# Patient Record
Sex: FEMALE | NOT HISPANIC OR LATINO | Employment: UNEMPLOYED | URBAN - METROPOLITAN AREA
[De-identification: names, ages, dates, MRNs, and addresses within clinical notes are randomized per-mention and may not be internally consistent; named-entity substitution may affect disease eponyms.]

---

## 2020-01-01 ENCOUNTER — APPOINTMENT (OUTPATIENT)
Dept: RADIOLOGY | Facility: MEDICAL CENTER | Age: 40
DRG: 179 | End: 2020-01-01
Payer: OTHER GOVERNMENT

## 2020-01-01 ENCOUNTER — APPOINTMENT (OUTPATIENT)
Dept: CARDIOLOGY | Facility: MEDICAL CENTER | Age: 40
DRG: 179 | End: 2020-01-01
Attending: STUDENT IN AN ORGANIZED HEALTH CARE EDUCATION/TRAINING PROGRAM
Payer: OTHER GOVERNMENT

## 2020-01-01 ENCOUNTER — PATIENT OUTREACH (OUTPATIENT)
Dept: HEALTH INFORMATION MANAGEMENT | Facility: OTHER | Age: 40
End: 2020-01-01

## 2020-01-01 ENCOUNTER — HOSPITAL ENCOUNTER (INPATIENT)
Facility: MEDICAL CENTER | Age: 40
LOS: 2 days | DRG: 179 | End: 2020-12-16
Attending: EMERGENCY MEDICINE | Admitting: STUDENT IN AN ORGANIZED HEALTH CARE EDUCATION/TRAINING PROGRAM
Payer: OTHER GOVERNMENT

## 2020-01-01 ENCOUNTER — PHARMACY VISIT (OUTPATIENT)
Dept: PHARMACY | Facility: MEDICAL CENTER | Age: 40
End: 2020-01-01
Payer: COMMERCIAL

## 2020-01-01 VITALS
WEIGHT: 130.51 LBS | OXYGEN SATURATION: 96 % | HEART RATE: 87 BPM | DIASTOLIC BLOOD PRESSURE: 65 MMHG | SYSTOLIC BLOOD PRESSURE: 95 MMHG | RESPIRATION RATE: 20 BRPM | BODY MASS INDEX: 22.28 KG/M2 | TEMPERATURE: 97 F | HEIGHT: 64 IN

## 2020-01-01 DIAGNOSIS — R06.00 DYSPNEA, UNSPECIFIED TYPE: ICD-10-CM

## 2020-01-01 LAB
ALBUMIN SERPL BCP-MCNC: 3 G/DL (ref 3.2–4.9)
ALBUMIN SERPL BCP-MCNC: 3 G/DL (ref 3.2–4.9)
ALBUMIN/GLOB SERPL: 0.6 G/DL
ALP SERPL-CCNC: 101 U/L (ref 30–99)
ALP SERPL-CCNC: 96 U/L (ref 30–99)
ALT SERPL-CCNC: 13 U/L (ref 2–50)
ALT SERPL-CCNC: 17 U/L (ref 2–50)
ANION GAP SERPL CALC-SCNC: 7 MMOL/L (ref 7–16)
ANION GAP SERPL CALC-SCNC: 8 MMOL/L (ref 7–16)
ANISOCYTOSIS BLD QL SMEAR: ABNORMAL
AST SERPL-CCNC: 24 U/L (ref 12–45)
AST SERPL-CCNC: 31 U/L (ref 12–45)
BASOPHILS # BLD AUTO: 0.9 % (ref 0–1.8)
BASOPHILS # BLD: 0.08 K/UL (ref 0–0.12)
BILIRUB CONJ SERPL-MCNC: 0.3 MG/DL (ref 0.1–0.5)
BILIRUB INDIRECT SERPL-MCNC: 0.5 MG/DL (ref 0–1)
BILIRUB SERPL-MCNC: 0.8 MG/DL (ref 0.1–1.5)
BILIRUB SERPL-MCNC: 1.1 MG/DL (ref 0.1–1.5)
BUN SERPL-MCNC: 15 MG/DL (ref 8–22)
BUN SERPL-MCNC: 8 MG/DL (ref 8–22)
BURR CELLS BLD QL SMEAR: NORMAL
CALCIUM SERPL-MCNC: 8.3 MG/DL (ref 8.5–10.5)
CALCIUM SERPL-MCNC: 8.4 MG/DL (ref 8.5–10.5)
CHLORIDE SERPL-SCNC: 100 MMOL/L (ref 96–112)
CHLORIDE SERPL-SCNC: 104 MMOL/L (ref 96–112)
CK SERPL-CCNC: 74 U/L (ref 0–154)
CO2 SERPL-SCNC: 22 MMOL/L (ref 20–33)
CO2 SERPL-SCNC: 25 MMOL/L (ref 20–33)
COVID ORDER STATUS COVID19: NORMAL
CREAT SERPL-MCNC: 0.88 MG/DL (ref 0.5–1.4)
CREAT SERPL-MCNC: 0.98 MG/DL (ref 0.5–1.4)
CRP SERPL HS-MCNC: 3.02 MG/DL (ref 0–0.75)
EKG IMPRESSION: NORMAL
EOSINOPHIL # BLD AUTO: 0.15 K/UL (ref 0–0.51)
EOSINOPHIL NFR BLD: 1.7 % (ref 0–6.9)
ERYTHROCYTE [DISTWIDTH] IN BLOOD BY AUTOMATED COUNT: 57.1 FL (ref 35.9–50)
ERYTHROCYTE [DISTWIDTH] IN BLOOD BY AUTOMATED COUNT: 57.7 FL (ref 35.9–50)
FLUAV RNA SPEC QL NAA+PROBE: NEGATIVE
FLUBV RNA SPEC QL NAA+PROBE: NEGATIVE
GLOBULIN SER CALC-MCNC: 5.2 G/DL (ref 1.9–3.5)
GLUCOSE SERPL-MCNC: 84 MG/DL (ref 65–99)
GLUCOSE SERPL-MCNC: 85 MG/DL (ref 65–99)
HCT VFR BLD AUTO: 35.7 % (ref 37–47)
HCT VFR BLD AUTO: 36.1 % (ref 37–47)
HGB BLD-MCNC: 11.2 G/DL (ref 12–16)
HGB BLD-MCNC: 11.2 G/DL (ref 12–16)
INR PPP: 1.36 (ref 0.87–1.13)
LV EJECT FRACT  99904: 50
LV EJECT FRACT MOD 2C 99903: 50
LV EJECT FRACT MOD 4C 99902: 51.95
LV EJECT FRACT MOD BP 99901: 58.46
LYMPHOCYTES # BLD AUTO: 0.45 K/UL (ref 1–4.8)
LYMPHOCYTES NFR BLD: 5.2 % (ref 22–41)
MACROCYTES BLD QL SMEAR: ABNORMAL
MAGNESIUM SERPL-MCNC: 1.4 MG/DL (ref 1.5–2.5)
MANUAL DIFF BLD: NORMAL
MCH RBC QN AUTO: 28.3 PG (ref 27–33)
MCH RBC QN AUTO: 29.3 PG (ref 27–33)
MCHC RBC AUTO-ENTMCNC: 31 G/DL (ref 33.6–35)
MCHC RBC AUTO-ENTMCNC: 31.4 G/DL (ref 33.6–35)
MCV RBC AUTO: 91.2 FL (ref 81.4–97.8)
MCV RBC AUTO: 93.5 FL (ref 81.4–97.8)
MONOCYTES # BLD AUTO: 0.23 K/UL (ref 0–0.85)
MONOCYTES NFR BLD AUTO: 2.6 % (ref 0–13.4)
MORPHOLOGY BLD-IMP: NORMAL
NEUTROPHILS # BLD AUTO: 7.8 K/UL (ref 2–7.15)
NEUTROPHILS NFR BLD: 89.6 % (ref 44–72)
NRBC # BLD AUTO: 0 K/UL
NRBC BLD-RTO: 0 /100 WBC
NT-PROBNP SERPL IA-MCNC: 3645 PG/ML (ref 0–125)
OVALOCYTES BLD QL SMEAR: NORMAL
PHOSPHATE SERPL-MCNC: 3.4 MG/DL (ref 2.5–4.5)
PLATELET # BLD AUTO: 262 K/UL (ref 164–446)
PLATELET # BLD AUTO: 305 K/UL (ref 164–446)
PLATELET BLD QL SMEAR: NORMAL
PMV BLD AUTO: 10.3 FL (ref 9–12.9)
PMV BLD AUTO: 10.3 FL (ref 9–12.9)
POIKILOCYTOSIS BLD QL SMEAR: NORMAL
POTASSIUM SERPL-SCNC: 3.9 MMOL/L (ref 3.6–5.5)
POTASSIUM SERPL-SCNC: 4 MMOL/L (ref 3.6–5.5)
PROCALCITONIN SERPL-MCNC: <0.05 NG/ML
PROT SERPL-MCNC: 8.1 G/DL (ref 6–8.2)
PROT SERPL-MCNC: 8.2 G/DL (ref 6–8.2)
PROTHROMBIN TIME: 17.2 SEC (ref 12–14.6)
RBC # BLD AUTO: 3.82 M/UL (ref 4.2–5.4)
RBC # BLD AUTO: 3.96 M/UL (ref 4.2–5.4)
RBC BLD AUTO: PRESENT
RSV RNA SPEC QL NAA+PROBE: NEGATIVE
SARS-COV-2 RNA RESP QL NAA+PROBE: DETECTED
SODIUM SERPL-SCNC: 132 MMOL/L (ref 135–145)
SODIUM SERPL-SCNC: 134 MMOL/L (ref 135–145)
SPECIMEN SOURCE: ABNORMAL
TROPONIN T SERPL-MCNC: 13 NG/L (ref 6–19)
TROPONIN T SERPL-MCNC: 15 NG/L (ref 6–19)
TROPONIN T SERPL-MCNC: 15 NG/L (ref 6–19)
WBC # BLD AUTO: 6.5 K/UL (ref 4.8–10.8)
WBC # BLD AUTO: 8.7 K/UL (ref 4.8–10.8)

## 2020-01-01 PROCEDURE — 700111 HCHG RX REV CODE 636 W/ 250 OVERRIDE (IP): Performed by: EMERGENCY MEDICINE

## 2020-01-01 PROCEDURE — 96374 THER/PROPH/DIAG INJ IV PUSH: CPT

## 2020-01-01 PROCEDURE — C9803 HOPD COVID-19 SPEC COLLECT: HCPCS | Performed by: EMERGENCY MEDICINE

## 2020-01-01 PROCEDURE — A9270 NON-COVERED ITEM OR SERVICE: HCPCS | Performed by: STUDENT IN AN ORGANIZED HEALTH CARE EDUCATION/TRAINING PROGRAM

## 2020-01-01 PROCEDURE — 93005 ELECTROCARDIOGRAM TRACING: CPT | Performed by: STUDENT IN AN ORGANIZED HEALTH CARE EDUCATION/TRAINING PROGRAM

## 2020-01-01 PROCEDURE — 700111 HCHG RX REV CODE 636 W/ 250 OVERRIDE (IP): Performed by: STUDENT IN AN ORGANIZED HEALTH CARE EDUCATION/TRAINING PROGRAM

## 2020-01-01 PROCEDURE — 93005 ELECTROCARDIOGRAM TRACING: CPT | Performed by: EMERGENCY MEDICINE

## 2020-01-01 PROCEDURE — 85027 COMPLETE CBC AUTOMATED: CPT

## 2020-01-01 PROCEDURE — 99232 SBSQ HOSP IP/OBS MODERATE 35: CPT | Performed by: INTERNAL MEDICINE

## 2020-01-01 PROCEDURE — 80053 COMPREHEN METABOLIC PANEL: CPT

## 2020-01-01 PROCEDURE — 84484 ASSAY OF TROPONIN QUANT: CPT

## 2020-01-01 PROCEDURE — 36415 COLL VENOUS BLD VENIPUNCTURE: CPT

## 2020-01-01 PROCEDURE — 85610 PROTHROMBIN TIME: CPT

## 2020-01-01 PROCEDURE — 84100 ASSAY OF PHOSPHORUS: CPT

## 2020-01-01 PROCEDURE — 93010 ELECTROCARDIOGRAM REPORT: CPT | Mod: 76 | Performed by: INTERNAL MEDICINE

## 2020-01-01 PROCEDURE — A9270 NON-COVERED ITEM OR SERVICE: HCPCS | Performed by: INTERNAL MEDICINE

## 2020-01-01 PROCEDURE — 700102 HCHG RX REV CODE 250 W/ 637 OVERRIDE(OP): Performed by: STUDENT IN AN ORGANIZED HEALTH CARE EDUCATION/TRAINING PROGRAM

## 2020-01-01 PROCEDURE — 99222 1ST HOSP IP/OBS MODERATE 55: CPT | Performed by: STUDENT IN AN ORGANIZED HEALTH CARE EDUCATION/TRAINING PROGRAM

## 2020-01-01 PROCEDURE — 93005 ELECTROCARDIOGRAM TRACING: CPT

## 2020-01-01 PROCEDURE — RXMED WILLOW AMBULATORY MEDICATION CHARGE: Performed by: INTERNAL MEDICINE

## 2020-01-01 PROCEDURE — 93306 TTE W/DOPPLER COMPLETE: CPT

## 2020-01-01 PROCEDURE — 700102 HCHG RX REV CODE 250 W/ 637 OVERRIDE(OP): Performed by: INTERNAL MEDICINE

## 2020-01-01 PROCEDURE — 93306 TTE W/DOPPLER COMPLETE: CPT | Mod: 26 | Performed by: INTERNAL MEDICINE

## 2020-01-01 PROCEDURE — 99285 EMERGENCY DEPT VISIT HI MDM: CPT

## 2020-01-01 PROCEDURE — 99239 HOSP IP/OBS DSCHRG MGMT >30: CPT | Performed by: INTERNAL MEDICINE

## 2020-01-01 PROCEDURE — 80048 BASIC METABOLIC PNL TOTAL CA: CPT

## 2020-01-01 PROCEDURE — 80076 HEPATIC FUNCTION PANEL: CPT

## 2020-01-01 PROCEDURE — 82550 ASSAY OF CK (CPK): CPT

## 2020-01-01 PROCEDURE — 85007 BL SMEAR W/DIFF WBC COUNT: CPT

## 2020-01-01 PROCEDURE — 0241U HCHG SARS-COV-2 COVID-19 NFCT DS RESP RNA 4 TRGT MIC: CPT

## 2020-01-01 PROCEDURE — 770014 HCHG ROOM/CARE - WARD (150)

## 2020-01-01 PROCEDURE — 700111 HCHG RX REV CODE 636 W/ 250 OVERRIDE (IP): Performed by: INTERNAL MEDICINE

## 2020-01-01 PROCEDURE — 99223 1ST HOSP IP/OBS HIGH 75: CPT | Performed by: INTERNAL MEDICINE

## 2020-01-01 PROCEDURE — 86140 C-REACTIVE PROTEIN: CPT

## 2020-01-01 PROCEDURE — 83735 ASSAY OF MAGNESIUM: CPT

## 2020-01-01 PROCEDURE — 770020 HCHG ROOM/CARE - TELE (206)

## 2020-01-01 PROCEDURE — 84145 PROCALCITONIN (PCT): CPT

## 2020-01-01 PROCEDURE — 99233 SBSQ HOSP IP/OBS HIGH 50: CPT | Performed by: INTERNAL MEDICINE

## 2020-01-01 PROCEDURE — 71045 X-RAY EXAM CHEST 1 VIEW: CPT

## 2020-01-01 PROCEDURE — 83880 ASSAY OF NATRIURETIC PEPTIDE: CPT

## 2020-01-01 RX ORDER — POTASSIUM CHLORIDE 20 MEQ/1
20 TABLET, EXTENDED RELEASE ORAL ONCE
Status: COMPLETED | OUTPATIENT
Start: 2020-01-01 | End: 2020-01-01

## 2020-01-01 RX ORDER — NITROGLYCERIN 0.4 MG/1
0.4 TABLET SUBLINGUAL
Status: DISCONTINUED | OUTPATIENT
Start: 2020-01-01 | End: 2020-01-01 | Stop reason: HOSPADM

## 2020-01-01 RX ORDER — ACETAMINOPHEN 325 MG/1
650 TABLET ORAL EVERY 6 HOURS PRN
Status: DISCONTINUED | OUTPATIENT
Start: 2020-01-01 | End: 2020-01-01 | Stop reason: HOSPADM

## 2020-01-01 RX ORDER — ASPIRIN 81 MG/1
81 TABLET ORAL DAILY
Qty: 30 TAB | Refills: 1 | Status: SHIPPED | OUTPATIENT
Start: 2020-01-01 | End: 2021-01-01

## 2020-01-01 RX ORDER — FUROSEMIDE 10 MG/ML
20 INJECTION INTRAMUSCULAR; INTRAVENOUS ONCE
Status: COMPLETED | OUTPATIENT
Start: 2020-01-01 | End: 2020-01-01

## 2020-01-01 RX ORDER — POTASSIUM CHLORIDE 750 MG/1
10 TABLET, EXTENDED RELEASE ORAL DAILY
Qty: 30 EACH | Refills: 0 | Status: SHIPPED | OUTPATIENT
Start: 2020-01-01 | End: 2021-01-01 | Stop reason: SDUPTHER

## 2020-01-01 RX ORDER — FUROSEMIDE 10 MG/ML
20 INJECTION INTRAMUSCULAR; INTRAVENOUS
Status: DISCONTINUED | OUTPATIENT
Start: 2020-01-01 | End: 2020-01-01 | Stop reason: HOSPADM

## 2020-01-01 RX ORDER — ASPIRIN 325 MG
325 TABLET ORAL DAILY
Status: DISCONTINUED | OUTPATIENT
Start: 2020-01-01 | End: 2020-01-01

## 2020-01-01 RX ORDER — MAGNESIUM SULFATE HEPTAHYDRATE 40 MG/ML
2 INJECTION, SOLUTION INTRAVENOUS ONCE
Status: COMPLETED | OUTPATIENT
Start: 2020-01-01 | End: 2020-01-01

## 2020-01-01 RX ORDER — ONDANSETRON 4 MG/1
4 TABLET, ORALLY DISINTEGRATING ORAL EVERY 6 HOURS PRN
Status: DISCONTINUED | OUTPATIENT
Start: 2020-01-01 | End: 2020-01-01 | Stop reason: HOSPADM

## 2020-01-01 RX ORDER — ONDANSETRON 2 MG/ML
4 INJECTION INTRAMUSCULAR; INTRAVENOUS EVERY 6 HOURS PRN
Status: DISCONTINUED | OUTPATIENT
Start: 2020-01-01 | End: 2020-01-01 | Stop reason: HOSPADM

## 2020-01-01 RX ORDER — FUROSEMIDE 20 MG/1
20 TABLET ORAL DAILY
Qty: 60 TAB | Refills: 0 | Status: SHIPPED | OUTPATIENT
Start: 2020-01-01 | End: 2021-01-01 | Stop reason: SDUPTHER

## 2020-01-01 RX ADMIN — POTASSIUM CHLORIDE 20 MEQ: 1500 TABLET, EXTENDED RELEASE ORAL at 06:42

## 2020-01-01 RX ADMIN — METOPROLOL TARTRATE 12.5 MG: 25 TABLET, FILM COATED ORAL at 05:13

## 2020-01-01 RX ADMIN — METOPROLOL TARTRATE 12.5 MG: 25 TABLET, FILM COATED ORAL at 15:13

## 2020-01-01 RX ADMIN — FUROSEMIDE 20 MG: 10 INJECTION, SOLUTION INTRAMUSCULAR; INTRAVENOUS at 22:16

## 2020-01-01 RX ADMIN — FUROSEMIDE 20 MG: 20 INJECTION, SOLUTION INTRAMUSCULAR; INTRAVENOUS at 18:30

## 2020-01-01 RX ADMIN — ASPIRIN 81 MG: 81 TABLET, COATED ORAL at 05:14

## 2020-01-01 RX ADMIN — MAGNESIUM SULFATE 2 G: 2 INJECTION INTRAVENOUS at 06:43

## 2020-01-01 RX ADMIN — METOPROLOL TARTRATE 12.5 MG: 25 TABLET, FILM COATED ORAL at 17:05

## 2020-01-01 RX ADMIN — ASPIRIN 325 MG: 325 TABLET, FILM COATED ORAL at 00:15

## 2020-01-01 RX ADMIN — ENOXAPARIN SODIUM 40 MG: 40 INJECTION SUBCUTANEOUS at 06:42

## 2020-01-01 RX ADMIN — ENOXAPARIN SODIUM 40 MG: 40 INJECTION SUBCUTANEOUS at 05:13

## 2020-01-01 RX ADMIN — FUROSEMIDE 20 MG: 10 INJECTION, SOLUTION INTRAMUSCULAR; INTRAVENOUS at 05:13

## 2020-01-01 SDOH — ECONOMIC STABILITY: FOOD INSECURITY: WITHIN THE PAST 12 MONTHS, YOU WORRIED THAT YOUR FOOD WOULD RUN OUT BEFORE YOU GOT MONEY TO BUY MORE.: SOMETIMES TRUE

## 2020-01-01 SDOH — ECONOMIC STABILITY: INCOME INSECURITY: HOW HARD IS IT FOR YOU TO PAY FOR THE VERY BASICS LIKE FOOD, HOUSING, MEDICAL CARE, AND HEATING?: SOMEWHAT HARD

## 2020-01-01 SDOH — ECONOMIC STABILITY: TRANSPORTATION INSECURITY
IN THE PAST 12 MONTHS, HAS LACK OF TRANSPORTATION KEPT YOU FROM MEETINGS, WORK, OR FROM GETTING THINGS NEEDED FOR DAILY LIVING?: NO

## 2020-01-01 SDOH — ECONOMIC STABILITY: FOOD INSECURITY: WITHIN THE PAST 12 MONTHS, THE FOOD YOU BOUGHT JUST DIDN'T LAST AND YOU DIDN'T HAVE MONEY TO GET MORE.: SOMETIMES TRUE

## 2020-01-01 SDOH — ECONOMIC STABILITY: TRANSPORTATION INSECURITY
IN THE PAST 12 MONTHS, HAS THE LACK OF TRANSPORTATION KEPT YOU FROM MEDICAL APPOINTMENTS OR FROM GETTING MEDICATIONS?: NO

## 2020-01-01 ASSESSMENT — ENCOUNTER SYMPTOMS
ORTHOPNEA: 0
SHORTNESS OF BREATH: 0
FALLS: 0
DIZZINESS: 0
COUGH: 1
WHEEZING: 0
PALPITATIONS: 0
DIZZINESS: 0
ABDOMINAL PAIN: 0
SPUTUM PRODUCTION: 0
HEMOPTYSIS: 0
FEVER: 1
COUGH: 0
FEVER: 0
ABDOMINAL PAIN: 0
DEPRESSION: 0
SHORTNESS OF BREATH: 1
PND: 0
SHORTNESS OF BREATH: 1
LOSS OF CONSCIOUSNESS: 0

## 2020-01-01 ASSESSMENT — FIBROSIS 4 INDEX
FIB4 SCORE: 1.15
FIB4 SCORE: 1.15

## 2020-12-14 PROBLEM — U07.1 COVID-19 VIRUS DETECTED: Status: ACTIVE | Noted: 2020-01-01

## 2020-12-14 PROBLEM — R07.81 PLEURITIC CHEST PAIN: Status: ACTIVE | Noted: 2020-01-01

## 2020-12-15 PROBLEM — R07.81 PLEURITIC CHEST PAIN: Status: RESOLVED | Noted: 2020-01-01 | Resolved: 2020-01-01

## 2020-12-15 PROBLEM — I05.0 MITRAL STENOSIS: Status: ACTIVE | Noted: 2020-01-01

## 2020-12-15 NOTE — PROGRESS NOTES
Patient developed chest pain left sided  Informed RN to give nitroglycerin 0.4 mg and obtain STAT EKG.  Transfer to telemetry floor. Serial troponins/EKG. Echocardiogram ordered in AM.   COVID 19 positive.

## 2020-12-15 NOTE — ASSESSMENT & PLAN NOTE
Severe MS, vew diagnosis seen on echo.  Likely the etiology of chest pain last night.  Consult Cardiology for evaluation for treatment options.  Cont diuresis, BB.

## 2020-12-15 NOTE — ASSESSMENT & PLAN NOTE
COVID-19 positive. Pleuritic and reproducible chest pain. Denies any chest pain at this time.   Check another troponin. If patient develops chest pain and troponin elevates may transfer to telemetry.  Elevated BNP will perform echocardiogram transthoracic to assess for WMA and LVF  Trial of Lasix IV 20 mg daily given soft blood pressure  EKG showing normal sinus rhythm, RAD, t wave inversions anterior leads.   Aspirin 324 mg daily

## 2020-12-15 NOTE — PROGRESS NOTES
MD Mcgregor updated on patients status/vital signs since transfer. Orders placed for AM medications. Will re-check blood pressure prior to admin of metoprolol and IV lasix. OK with MD Mcgregor to hold if needed. Will continue to monitor.

## 2020-12-15 NOTE — ED NOTES
Report given to JUVE Samano. Patient to go to [facility] [[email or web address] Patient ambulatory to the bathroom with a steady gait.

## 2020-12-15 NOTE — ED NOTES
Pt called from front lobby and senior lounge with no response. Will try again in approximately 15 minutes.

## 2020-12-15 NOTE — ED NOTES
Pt to -01 via Transport. Pt with all belongings in possession. Pt is MEDICAL and room air. Report has been given.

## 2020-12-15 NOTE — ED NOTES
Pt rounded on by RN. Pt reports symptoms are the same, no changes to initial triage. Lab work reviewed. Vitals previously obtained and charted by tech. Pt placed back in lobby, educated to notify staff of changes.

## 2020-12-15 NOTE — PROGRESS NOTES
"HOSPITAL MEDICINE PROGRESS NOTE    Date of service  12/15/2020    Chief Complaint  Chest Pain (Starting yesterday, L sided, radiates to back, described as heavy, intermittent, worse with coughing, denies N/V or diaphoresis) and Shortness of Breath (Starting yesterday, denies viral sx, denies respiratory hx, SOB at rest)      Hospital Course:     39 yo woman p/w SOB due to COVID-19, incidentally found to have severe MS on echo.           Interval History Updates:  CP, transferred to telemetry    Consultants/Specialty  Cardiology    Review of Systems   Review of Systems   Constitutional: Negative for fever.   Respiratory: Positive for shortness of breath. Negative for cough, hemoptysis, sputum production and wheezing.    Cardiovascular: Negative for chest pain and palpitations.        Medications:  • nitroglycerin  0.4 mg Q5 MIN PRN   • metoprolol  12.5 mg TWICE DAILY   • ondansetron  4 mg Q6HRS PRN   • ondansetron  4 mg Q6HRS PRN   • enoxaparin  40 mg DAILY   • acetaminophen  650 mg Q6HRS PRN   • aspirin  325 mg DAILY   • furosemide  20 mg Q DAY       Physical Exam   Vitals:    12/15/20 0004 12/15/20 0640 12/15/20 0710 12/15/20 1118   BP: (!) 98/74 (!) 88/62 103/69 104/71   Pulse: 61 (!) 108 95 99   Resp: 18  16 17   Temp: 36.3 °C (97.4 °F)  36.3 °C (97.4 °F) 36.4 °C (97.6 °F)   TempSrc: Temporal  Temporal Temporal   SpO2: 97%  96% 95%   Weight: 59.3 kg (130 lb 11.7 oz)      Height: 1.626 m (5' 4\")          Physical Exam   Constitutional: She is well-developed, well-nourished, and in no distress. No distress.   Eyes: Pupils are equal, round, and reactive to light. No scleral icterus.   Cardiovascular: Regular rhythm.   Murmur heard.  Pulmonary/Chest: Effort normal and breath sounds normal. No respiratory distress. She has no wheezes. She has no rales. She exhibits no tenderness.   Neurological: She is alert.   Nursing note and vitals reviewed.         Laboratory   Recent Labs     12/14/20  1557   WBC 8.7   RBC 3.82* " "  HEMOGLOBIN 11.2*   HEMATOCRIT 35.7*   PLATELETCT 262     Recent Labs     12/14/20  1557   SODIUM 134*   POTASSIUM 3.9   CHLORIDE 104   CO2 22   GLUCOSE 84   BUN 8   CREATININE 0.88      Recent Labs     12/14/20  1557   ALTSGPT 17   ASTSGOT 31   ALKPHOSPHAT 101*   TBILIRUBIN 1.1   GLUCOSE 84                Microbiology  Results     Procedure Component Value Units Date/Time    CoV-2, Flu A/B, And RSV by PCR [[id number]]  (Abnormal) Collected: 12/14/20 2057    Order Status: Completed Updated: 12/14/20 2207     Influenza virus A RNA Negative     Influenza virus B, PCR Negative     RSV, PCR Negative     SARS-CoV-2 by PCR DETECTED     Comment: PATIENTS: Important information regarding your results and instructions can  be found at [email or web address]   \"After your  Covid-19 Test\"  **The [employer] GeneXpert Xpress SARS-CoV-2 Test has been made available for  use under the Emergency Use Authorization (EUA) only.          SARS-CoV-2 Source NP Swab    Narrative:      Is patient being admitted?->Yes  Does this patient meet criteria for Rush/Cepheid per [facility]  Inpatient Workflow? (See workflow link below)->Yes  Expected turn around time?->Rush (Cepheid 2-4 hours)  Have you been in close contact with a person who is suspected  or known to be positive for COVID-19 within the last 30 days  (e.g. last seen that person < 30 days ago)->Unknown    COVID/SARS CoV-2 PCR [[id number]] Collected: 12/14/20 2057    Order Status: Completed Specimen: Respirate from Nasopharyngeal Updated: 12/14/20 2113     COVID Order Status Received     Comment: The order for SARS CoV-2 testing has been received by the  Laboratory. This result is neither positive nor negative.  Final results of testing will report in 24-48 hours, separately.         Narrative:      Is patient being admitted?->Yes  Does this patient meet criteria for Rush/Cepheid per [facility]  Inpatient Workflow? (See workflow link below)->Yes  Expected turn around " time?->Rush (Cepheid 2-4 hours)  Have you been in close contact with a person who is suspected  or known to be positive for COVID-19 within the last 30 days  (e.g. last seen that person < 30 days ago)->Unknown             Labs reviewed by me and noted above.    Radiology  EC-ECHOCARDIOGRAM COMPLETE W/O CONT  Transthoracic  Echo Report    Echocardiography Laboratory    CONCLUSIONS  No prior study is available for comparison.   Left ventricular systolic function is low normal.  Left ventricular ejection fraction is visually estimated to be 50%.  Severely dilated right ventricle. Reduced right ventricular systolic   function.  Heavily calcified mitral valve with severe mitral stenosis.  Mean transvalvular gradient is 20 mmHg at a heart rate of 102 BPM.  Severe tricuspid regurgitation.  Estimated right ventricular systolic pressure is 105 mmHg.  Right heart pressures are consistent with severe pulmonary   hypertension.    SHANE CORMIERROSA ELENA  Exam Date:         12/15/2020                      11:32  Exam Location:     Inpatient  Priority:          Routine    Ordering Physician:        TANJA LYLES  Referring Physician:       [id number] Menendez  Sonographer:               Rylee Argueta [facility]    Age:    40     Gender:    F  MRN:    [id number]  :    1980  BSA:    1.64   Ht (in):    64     Wt (lb):    132  Exam Type:     Complete    Indications:     Heart Failure, Systolic  ICD Codes:       428.2    CPT Codes:       03312    BP:   110    /   73     HR:   110  Technical Quality:       Fair    MEASUREMENTS  (Male / Female) Normal Values  2D ECHO  LV Diastolic Diameter PLAX        3.4 cm                4.2 - 5.9 / 3.9 - 5.3   cm  LV Systolic Diameter PLAX         2.6 cm                2.1 - 4.0 cm  IVS Diastolic Thickness           0.77 cm                 LVPW Diastolic Thickness          0.88 cm                 LVOT Diameter                     1.8 cm                  Estimated LV Ejection Fraction    50 %                     LV Ejection Fraction MOD BP       58.5 %                >= 55  %  LV Ejection Fraction MOD 4C       51.9 %                  LV Ejection Fraction MOD 2C       50 %                      DOPPLER  AV Peak Velocity                  0.96 m/s                AV Peak Gradient                  3.7 mmHg                AV Mean Gradient                  2 mmHg                  LVOT Peak Velocity                0.9 m/s                 AV Area Cont Eq vti               2.5 cm2                 MV Velocity Time Integral         64.4 cm                 Mitral E Point Velocity           2.1 m/s                 Mitral E to A Ratio               0.92                    MV Pressure Half Time             65.5 ms                 MV Area PHT                       3.4 cm2                 MV Deceleration Time              192 ms                  TR Peak Velocity                  477 cm/s                  * Indicates values subject to auto-interpretation  LV EF:  50    %    FINDINGS  Left Ventricle  Left ventricle is small in size. Normal left ventricular wall   thickness. Left ventricular systolic function is low normal. Left   ventricular ejection fraction is visually estimated to be 50%. Normal   regional wall motion. A reliable estimation of diastolic function   cannot be made due to mitral valve disease.    Right Ventricle  Severely dilated right ventricle. Reduced right ventricular systolic   function.    Right Atrium  Enlarged right atrium. Dilated inferior vena cava without inspiratory   collapse.    Left Atrium  Severely dilated left atrium. Left atrial volume index is 62  mL/sq   m.[id number]    Mitral Valve  Heavily calcified mitral valve with severe mitral stenosis. Mean   transvalvular gradient is 20 mmHg at a heart rate of 102 BPM. No mitral   regurgitation.    Aortic Valve  Structurally normal aortic valve without significant stenosis or   regurgitation.    Tricuspid Valve  Structurally normal tricuspid valve. No tricuspid  stenosis. Severe   tricuspid regurgitation. Estimated right ventricular systolic pressure   is 105 mmHg. Right heart pressures are consistent with severe pulmonary   hypertension.    Pulmonic Valve  The pulmonic valve is not well visualized.    Pericardium  Trivial pericardial effusion.    Aorta  Normal aortic root for body surface area. Ascending aorta not well   visualized.    Cliff Nieto M.D.  (Electronically Signed)  Final Date:     15 December 2020                   12:33      No results found for this or any previous visit.       Assessment and Plan      Principal Problem:    COVID-19 virus detected POA: Unknown  Active Problems:    Mitral stenosis POA: No  Resolved Problems:    Pleuritic chest pain POA: Unknown    Continue supportive care, oxygen via NC  Echo result noted.  Discussed with Cardiology for consult.  Continue BB, lasix, ASA.    DVT prophylaxis: Lovenox    CODE STATUS:  FULL CODE    Disposition: TBD.

## 2020-12-15 NOTE — ED PROVIDER NOTES
ED Provider Note    CHIEF COMPLAINT  Chief Complaint   Patient presents with   • Chest Pain     Starting yesterday, L sided, radiates to back, described as heavy, intermittent, worse with coughing, denies N/V or diaphoresis   • Shortness of Breath     Starting yesterday, denies viral sx, denies respiratory hx, SOB at rest       HPI  Carolyn Hauser is a 40 y.o. female who presents with a chief complaint of chest discomfort on the left side of her chest just above her breast.  Duration has been now for about 2 days is worse when she takes a deep breath or moves better when she moves her right side of her shoulder and her left chest.  It is also worse when she is walking and also when she is lying down and they are both associate with shortness of breath as well.  She does have some foot swelling bilaterally.  She states been going on for about 2 days but I suspect it might be longer    Patient is from [facility] she just arrived in [place] 2 days ago.  She has a 10-year-old son.  She is also here with her significant other.    Is uncertain if she is been around anyone with COVID-19 when asked about the Covid situation in [facility] patient had no answer.    REVIEW OF SYSTEMS  General: No fever or chills.  Eyes: No eye discharge. No eye pain.  Ear nose throat: No sore throat or  trouble swallowing.  Pulmonary: Positive shortness of breath positive cough  Cardiovascular: See above  GI: No abdominal pain nausea or vomiting.  : No dysuria or hematuria  Dermatologic: No rashes. No abrasions.  Neurologic: No weakness or numbness.      All other systems are negative      PAST MEDICAL HISTORY  Denies  FAMILY HISTORY  No family history on file.    SOCIAL HISTORY from [facility] here with her family  SURGICAL HISTORY  History reviewed. No pertinent surgical history.    CURRENT MEDICATIONS  Home Medications     Reviewed by Willie Hopson R.N. (Registered Nurse) on 12/14/20 at 1436  Med List Status: Partial   Medication Last  "Dose Status        Patient Primo Taking any Medications                       ALLERGIES  No Known Allergies    PHYSICAL EXAM  VITAL SIGNS: /70   Pulse (!) 103   Temp 36.1 °C (97 °F) (Temporal)   Resp 19   Ht 1.626 m (5' 4\")   Wt 60.2 kg (132 lb 11.5 oz)   SpO2 95%   BMI 22.78 kg/m²    Constitutional: Well developed, Well nourished, no acute distress,   HENT: Normocephalic, Atraumatic, Oropharynx moist, No oral exudates, Nose normal.   Eyes: PERRLA, EOMI, Conjunctiva normal, No discharge.   Musculoskeletal: Neck normal range of motion, No tenderness, Supple,  Cardiovascular: Regular rhythm rate of 96.  She has ++1 pitting edema bilaterally  Thorax & Lungs: Decreased breath sounds on the left with some coarse rhonchi in the left.  Abdomen: Bowel sounds normal, Soft, No tenderness, No masses, No pulsatile masses.   Skin: Warm, Dry, No erythema, No rash.   : No CVA tenderness.   Neurologic: Alert & oriented , moves all extremities equally  Psychiatric:  Calm, not anxious        RADIOLOGY/PROCEDURES  DX-CHEST-PORTABLE (1 VIEW)   Final Result         1.  Pulmonary edema and/or infiltrates.   2.  Trace left pleural effusion   3.  Cardiomegaly        Results for orders placed or performed during the hospital encounter of 12/14/20   CBC with Differential   Result Value Ref Range    WBC 8.7 4.8 - 10.8 K/uL    RBC 3.82 (L) 4.20 - 5.40 M/uL    Hemoglobin 11.2 (L) 12.0 - 16.0 g/dL    Hematocrit 35.7 (L) 37.0 - 47.0 %    MCV 93.5 81.4 - 97.8 fL    MCH 29.3 27.0 - 33.0 pg    MCHC 31.4 (L) 33.6 - 35.0 g/dL    RDW 57.7 (H) 35.9 - 50.0 fL    Platelet Count 262 164 - 446 K/uL    MPV 10.3 9.0 - 12.9 fL    Neutrophils-Polys 89.60 (H) 44.00 - 72.00 %    Lymphocytes 5.20 (L) 22.00 - 41.00 %    Monocytes 2.60 0.00 - 13.40 %    Eosinophils 1.70 0.00 - 6.90 %    Basophils 0.90 0.00 - 1.80 %    Nucleated RBC 0.00 /100 WBC    Neutrophils (Absolute) 7.80 (H) 2.00 - 7.15 K/uL    Lymphs (Absolute) 0.45 (L) 1.00 - 4.80 K/uL    Monos " (Absolute) 0.23 0.00 - 0.85 K/uL    Eos (Absolute) 0.15 0.00 - 0.51 K/uL    Baso (Absolute) 0.08 0.00 - 0.12 K/uL    NRBC (Absolute) 0.00 K/uL    Anisocytosis 2+ (A)     Macrocytosis 2+ (A)    Complete Metabolic Panel (CMP)   Result Value Ref Range    Sodium 134 (L) 135 - 145 mmol/L    Potassium 3.9 3.6 - 5.5 mmol/L    Chloride 104 96 - 112 mmol/L    Co2 22 20 - 33 mmol/L    Anion Gap 8.0 7.0 - 16.0    Glucose 84 65 - 99 mg/dL    Bun 8 8 - 22 mg/dL    Creatinine 0.88 0.50 - 1.40 mg/dL    Calcium 8.3 (L) 8.5 - 10.5 mg/dL    AST(SGOT) 31 12 - 45 U/L    ALT(SGPT) 17 2 - 50 U/L    Alkaline Phosphatase 101 (H) 30 - 99 U/L    Total Bilirubin 1.1 0.1 - 1.5 mg/dL    Albumin 3.0 (L) 3.2 - 4.9 g/dL    Total Protein 8.2 6.0 - 8.2 g/dL    Globulin 5.2 (H) 1.9 - 3.5 g/dL    A-G Ratio 0.6 g/dL   Troponin   Result Value Ref Range    Troponin T 13 6 - 19 ng/L   ESTIMATED GFR   Result Value Ref Range    GFR If African American >60 >60 mL/min/1.73 m 2    GFR If Non African American >60 >60 mL/min/1.73 m 2   DIFFERENTIAL MANUAL   Result Value Ref Range    Manual Diff Status PERFORMED    PERIPHERAL SMEAR REVIEW   Result Value Ref Range    Peripheral Smear Review see below    PLATELET ESTIMATE   Result Value Ref Range    Plt Estimation Normal    MORPHOLOGY   Result Value Ref Range    RBC Morphology Present     Poikilocytosis 1+     Ovalocytes 1+     Echinocytes 1+    COVID/SARS CoV-2 PCR    Specimen: Nasopharyngeal; Respirate   Result Value Ref Range    COVID Order Status Received    CoV-2, Flu A/B, And RSV by PCR   Result Value Ref Range    SARS-CoV-2 Source NP Swab    EKG   Result Value Ref Range    Report       [facility] Emergency Dept.    Test Date:  2020  Pt Name:    SARAH CORMIER              Department: ER  MRN:        [id number]                      Room:  Gender:     Female                       Technician: [id number]  :        1980                   Requested By:ER TRIAGE PROTOCOL  Order #:     [id number]                    Reading MD: Shahzad ROGEL MD    Measurements  Intervals                                Axis  Rate:       120                          P:          56  SD:         168                          QRS:        66  QRSD:       122                          T:          202  QT:         288  QTc:        407    Interpretive Statements  Normal sinus rhythm rate of 120.  Normal SD.  Normal QRS.  Right axis  deviation  T wave inversion in the anterior leads abnormal no acute ischemic findings  and  no previous to compare  Electronically Signed On 12- 21:25:06 PST by Shahzad ROGEL MD          COURSE & MEDICAL DECISION MAKING  Pertinent Labs & Imaging studies reviewed. (See chart for details)  Is a patient who appears to be either with pneumonia or CHF she is very pleasant 4-year-old female from [facility] at this point the patient has dyspnea exertion chest pain and pleural effusion at this point troponin was negative no signs of MI nurse his symptoms are asymptomatic PE is less likely she is tachycardic but with her pedal edema bilaterally and with the x-ray showing pleural effusion my suspicion is CHF she has cardiomegaly as well EKG is also abnormal    This point will recommend some diuresis we will getting a BNP.  In addition getting a Covid I have already tried to call the hospitalist but unfortunately need a Covid first before the proper disposition she is pending Covid testing before calling back for proper evaluation disposition.  40-year-old female from [facility] here for 2 days shortness of breath dyspnea exertion chest pain suggestive cardiomegaly megaly.    FINAL IMPRESSION  1.  Dyspnea  2.   3.      Electronically signed by: Shahzad Rogel M.D., 12/14/2020 9:21 PM

## 2020-12-15 NOTE — ED NOTES
Med rec updated and complete. Pt flew into [place] 2 days ago and has no local pharmacy.  Pt unsure of were she is staying . No known address here.  Pt takes no medications.

## 2020-12-15 NOTE — ED NOTES
Patient is from [facility], patient has only been in [place] in two days. Awaiting on COVID result before placing patient.

## 2020-12-15 NOTE — ASSESSMENT & PLAN NOTE
Continue with COVID isolation   Encourage prone positioning   Place on oxygen supplementation to keep O2 sat>92%   Check  Pro-calcitonin, CRP, D Dimer, PT/PTT, IL-6  Tylenol every 6 hours for fever/myalgias   Avoid NSAID's   If patient develops respiratory distress place on high flow oxygen if no improvement will consult ICU for possible intubation   If patient becomes hypotensive avoid excessive fluid resuscitation- suggest starting pressors early.

## 2020-12-15 NOTE — PROGRESS NOTES
Pt transferred from ER to , via transport. Pt ambulated from wheelchair to bed with steady gait. Monitor applied. Pt denies any chest pain or lightheadedness. Pt updated on POC. Pt orientated to room. All needs met at this time.

## 2020-12-15 NOTE — H&P
Hospital Medicine History & Physical Note    Date of Service  12/14/2020    Primary Care Physician  No primary care provider on file.    Consultants  N/A    Code Status  Full Code    Chief Complaint  Chief Complaint   Patient presents with   • Chest Pain     Starting yesterday, L sided, radiates to back, described as heavy, intermittent, worse with coughing, denies N/V or diaphoresis   • Shortness of Breath     Starting yesterday, denies viral sx, denies respiratory hx, SOB at rest       History of Presenting Illness  40 y.o. female with no significant past medical history recently moved to [place] from [facility] who presented 12/14/2020 with left-sided pleuritic and reproducible chest pain worse with breathing with associated shortness of breath and cough for the last 3 days.  Patient states that she lives with family not sure regarding COVID-19 exposure.      Patient endorsed by ER physician without COVID-19 swab.    As per [facility] COVID-19 workflow,  patients with typical COVID-19 symptoms as in this patient with shortness of breath and pleuritic chest pain I told ER physician that it is necessary for a resulted COVID-19 swab before endorsing patient for admission.      In the ER, chest x-ray significant for left-sided pleural effusion, labs significant for normocytic anemia, BMP mild hyponatremia 134 potassium 3.9, alkaline phosphatase 101, troponin of 13, BNP of 3,645.  COVID-19 test done resulted positive patient will be admitted.            Review of Systems  Review of Systems   Constitutional: Positive for fever.   Respiratory: Positive for cough and shortness of breath.    Cardiovascular: Positive for chest pain (Pleuritic and reproducible) and leg swelling.   All other systems reviewed and are negative.      Past Medical History  Reviewed and not pertinent     Surgical History  Reviewed and not pertinent    Family History  Reviewed and not pertinent     Social History   reviewed and not pertinent      Allergies  No Known Allergies    Medications  None       Physical Exam  Temp:  [36.1 °C (97 °F)-36.6 °C (97.9 °F)] 36.1 °C (97 °F)  Pulse:  [100-114] 108  Resp:  [16-20] 19  BP: ()/(65-76) 111/70  SpO2:  [95 %-97 %] 96 %    Physical Exam  Constitutional:       Appearance: Normal appearance.   HENT:      Head: Normocephalic and atraumatic.      Mouth/Throat:      Pharynx: Oropharynx is clear.   Eyes:      Extraocular Movements: Extraocular movements intact.      Pupils: Pupils are equal, round, and reactive to light.   Neck:      Musculoskeletal: Neck supple.   Cardiovascular:      Rate and Rhythm: Normal rate and regular rhythm.   Pulmonary:      Effort: Pulmonary effort is normal.      Breath sounds: Rales (Scattered) present.   Abdominal:      General: Abdomen is flat. Bowel sounds are normal.      Palpations: Abdomen is soft.   Musculoskeletal: Normal range of motion.      Right lower leg: Edema (Trace) present.      Left lower leg: Edema (Trace) present.   Skin:     General: Skin is warm and dry.   Neurological:      General: No focal deficit present.      Mental Status: She is alert and oriented to person, place, and time.   Psychiatric:         Mood and Affect: Mood normal.         Behavior: Behavior normal.         Laboratory:  Recent Labs     12/14/20  1557   WBC 8.7   RBC 3.82*   HEMOGLOBIN 11.2*   HEMATOCRIT 35.7*   MCV 93.5   MCH 29.3   MCHC 31.4*   RDW 57.7*   PLATELETCT 262   MPV 10.3     Recent Labs     12/14/20  1557   SODIUM 134*   POTASSIUM 3.9   CHLORIDE 104   CO2 22   GLUCOSE 84   BUN 8   CREATININE 0.88   CALCIUM 8.3*     Recent Labs     12/14/20  1557   ALTSGPT 17   ASTSGOT 31   ALKPHOSPHAT 101*   TBILIRUBIN 1.1   GLUCOSE 84         Recent Labs     12/14/20  1557   NTPROBNP 3645*         Recent Labs     12/14/20  1557   TROPONINT 13       Imaging:  DX-CHEST-PORTABLE (1 VIEW)   Final Result         1.  Pulmonary edema and/or infiltrates.   2.  Trace left pleural effusion   3.   Cardiomegaly      EC-ECHOCARDIOGRAM COMPLETE W/O CONT    (Results Pending)         Assessment/Plan:   I anticipate this patient is appropriate for observation status at this time.    * COVID-19 virus detected  Assessment & Plan  Continue with COVID isolation   Encourage prone positioning   Place on oxygen supplementation to keep O2 sat>92%   Check  Pro-calcitonin, CRP, D Dimer, PT/PTT, IL-6  Tylenol every 6 hours for fever/myalgias   Avoid NSAID's   If patient develops respiratory distress place on high flow oxygen if no improvement will consult ICU for possible intubation   If patient becomes hypotensive avoid excessive fluid resuscitation- suggest starting pressors early.       Pleuritic chest pain  Assessment & Plan  COVID-19 positive. Pleuritic and reproducible chest pain.   Check another troponin  Elevated BNP will perform echocardiogram transthoracic to assess for WMA and LVF  Trial of Lasix IV 20 mg daily given soft blood pressure  EKG showing normal sinus rhythm, RAD, t wave inversions anterior leads. Denies any chest pain at this time.   Aspirin 324 mg daily       VTE: Lovenox     Please note that this dictation was created using voice recognition software. I have made every reasonable attempt to correct obvious errors, but I expect that there are errors of grammar and possibly context that I did not discover before finalizing the note.     This patient was seen under COVID 19 pandemic disaster response conditions.  During a disaster, the provisions of care is subject to the Crisis Standard of Care

## 2020-12-15 NOTE — PROGRESS NOTES
Patient brought to 733-2 by this RN and ACLS RN Torey. Patient A&O x4,on room air. Patient stating 5/10 chest pain. Assessment complete. Tele box placed, monitor room notified. Patient oriented to unit and verbalized understanding on plan of care. Fall precautions in place, call light within reach. Will continue to monitor and provide updates.

## 2020-12-16 NOTE — DISCHARGE SUMMARY
HOSPITAL MEDICINE DISCHARGE SUMMARY    DATE OF ADMISSION:  12/14/2020    DATE OF DISCHARGE:  12/16/2020    CHIEF COMPLAINT ON ADMISSION  Chief Complaint   Patient presents with   • Chest Pain     Starting yesterday, L sided, radiates to back, described as heavy, intermittent, worse with coughing, denies N/V or diaphoresis   • Shortness of Breath     Starting yesterday, denies viral sx, denies respiratory hx, SOB at rest       CODE STATUS  Full Code    No Known Allergies    DISCHARGE PROBLEM LIST  Principal Problem:    COVID-19 virus detected POA: Yes  Active Problems:    Mitral stenosis POA: No  Resolved Problems:    Pleuritic chest pain POA: Unknown      MEDICATIONS ON DISCHARGE     Medication List      START taking these medications      Instructions   aspirin 81 MG EC tablet  Start taking on: December 17, 2020   Take 1 Tab by mouth every day for 30 days.  Dose: 81 mg     furosemide 40 MG Tabs  Commonly known as: LASIX   Take 0.5 Tabs by mouth every day for 30 days.  Dose: 20 mg     metoprolol 25 MG Tabs  Commonly known as: LOPRESSOR   Take 0.5 Tabs by mouth every 8 hours for 30 days.  Dose: 12.5 mg     potassium chloride SA 10 MEQ Tbcr  Commonly known as: K-DUR   Take 1 Tab by mouth every day for 30 days.  Dose: 10 mEq            CONSULTATIONS  Cardiology    HPI & HOSPITAL COURSE  39 yo woman p/w SOB due to COVID-19, admitted for close monitor, then developed chest pain on the day of admission that resulted in transfering to telemetry and ruled out for ACS.  Echo shows severe MS with pulmonary hypertension.  Patient was evaluated by Cardiology with recommendation for outpatient follow up for repeat echo and to discuss treatment options given that she has active COVID-19 infection.      [place] recently [place] to the [place] from [facility] as of last week, so she does not have health insurance.  I enlisted MSW assistance with helping patient establishing primary care after discharge and follow up with Cardiology,  whose information is included on her DC paper.        Clinically, her COVID-19 infection is mild.  She requires no supplemental oxygen and subjectively feel back to her normal self at discharge day.      Therefore, she is discharged in good and stable condition to home with close outpatient follow-up.    The patient met 2-midnight criteria for an inpatient stay at the time of discharge.    SPECIFIC OUTPATIENT FOLLOW-UP RECOMMENDATIONS  Follow up with Cardiology for mitral valve stenosis  Establish Primary Care    FOLLOW UP  PCP    Schedule an appointment as soon as possible for a visit  Need to establish Primary Care    [facility]  [address], [address]  [place] [id number]  [phone]  Schedule an appointment as soon as possible for a visit in 2 weeks  Call to schedule an appointment with a Cardiologist      DIET  Resume home diet previous to admission    ACTIVITY  Resume previous level of activities.    PROCEDURES  No surgery found  No surgery found    PERTINENT RESULTS  TTE  CONCLUSIONS  No prior study is available for comparison.   Left ventricular systolic function is low normal.  Left ventricular ejection fraction is visually estimated to be 50%.  Severely dilated right ventricle. Reduced right ventricular systolic function.  Heavily calcified mitral valve with severe mitral stenosis.  Mean transvalvular gradient is 20 mmHg at a heart rate of 102 BPM.  Severe tricuspid regurgitation.  Estimated right ventricular systolic pressure is 105 mmHg.  Right heart pressures are consistent with severe pulmonary hypertension.    Total time of the discharge process exceeds 32 minutes.

## 2020-12-16 NOTE — DISCHARGE PLANNING
Anticipated Discharge Disposition: Home    Action: Pt discussed in IDT rounds. She is a ACS candidate and will possibly transfer there pending her Neuro consult.     Barriers to Discharge: Medical clearance    Plan: LSW to continue coordinating with Pt, Pt's Family, and medical team for discharge planning.

## 2020-12-16 NOTE — DISCHARGE PLANNING
Anticipated Discharge Disposition: Home    Action: LSW attempted to call pt on room phone, no answer. Daksha messaged BS RN to ask for help reaching Pt.     LSW called [facility] to referral patient for their assistance following Pt after her discharge, left message.     LSW spoke with pharmacy for meds to beds, Pt has no money here and has no bank accounts so we will do Meds to beds approved services for Pt's prescriptions for $25.58    LSW took call from [facility] with [facility] to give her the information for the referral. They will call Pt once she discharges. Karin believes that the only insurance option will probably be Access to Healthcare.     Barriers to Discharge: Pt has no insurance and needs follow up with Cardiology     Plan: LSW to continue trying to reach [facility] for referral for post-discharge follow up.

## 2020-12-16 NOTE — PROGRESS NOTES
Bedside report received from day RN. PT is A&Ox4. PT is on room air. Tele box in place and verified by monitor room. No S/S of pain or discomfort at this time. PT is educated to use call light. Bed is in lowest and locked position. Isolation precautions in place Will continue plan of care.

## 2020-12-16 NOTE — DISCHARGE INSTRUCTIONS
Dr. Cortez's discharge instructions:    You should follow up with a Cardiologist to address treatment for the mitral valve stenosis in the near future.  This is imperative to prevent future complications of this condition.      DIET: Resume home diet previous to admission    ACTIVITY: Resume previous level of activities.    DIAGNOSIS: COVID-19 infection, Mitral valve stenosis, pulmonary hypertension.    Return to ER if fever greater than 38 degree Celsius or 100.4 degree Fahrenheit, worsening pain, chest pain at rest or associated with exertion, worsening shortness of breath, bloody coughs, bloody bowel movement, severe unrelenting abdominal pain, focal weakness.    Jose Cortez M.D.     Discharge Instructions    Discharged to home by car with self. Discharged via wheelchair, hospital escort: Yes.  Special equipment needed: Not Applicable    Be sure to schedule a follow-up appointment with your primary care doctor or any specialists as instructed.     Discharge Plan:   Diet Plan: Discussed  Activity Level: Discussed  Confirmed Follow up Appointment: Patient to Call and Schedule Appointment  Confirmed Symptoms Management: Discussed  Medication Reconciliation Updated: Yes  Influenza Vaccine Indication: Patient Refuses    I understand that a diet low in cholesterol, fat, and sodium is recommended for good health. Unless I have been given specific instructions below for another diet, I accept this instruction as my diet prescription.   Other diet: Regular    Special Instructions:     · Is patient discharged on Warfarin / Coumadin?   No     Depression / Suicide Risk    As you are discharged from this [facility] facility, it is important to learn how to keep safe from harming yourself.    Recognize the warning signs:  · Abrupt changes in personality, positive or negative- including increase in energy   · Giving away possessions  · Change in eating patterns- significant weight changes-  positive or negative  · Change in  sleeping patterns- unable to sleep or sleeping all the time   · Unwillingness or inability to communicate  · Depression  · Unusual sadness, discouragement and loneliness  · Talk of wanting to die  · Neglect of personal appearance   · Rebelliousness- reckless behavior  · Withdrawal from people/activities they love  · Confusion- inability to concentrate     If you or a loved one observes any of these behaviors or has concerns about self-harm, here's what you can do:  · Talk about it- your feelings and reasons for harming yourself  · Remove any means that you might use to hurt yourself (examples: pills, rope, extension cords, firearm)  · Get professional help from the community (Mental Health, Substance Abuse, psychological counseling)  · Do not be alone:Call your Safe Contact- someone whom you trust who will be there for you.  · Call your local CRISIS HOTLINE 541-2807 or 848-373-1344  · Call your local Children's Mobile Crisis Response Team [place] (669) 520-3082 or www.Joinity  · Call the toll free National Suicide Prevention Hotlines   · National Suicide Prevention Lifeline 296-803-[facility] (9013)  · National Hope Line Network 800-SUICIDE (192-3646)    Mitral Valve Stenosis    Mitral valve stenosis is a narrowing of the mitral valve, which is the valve between the upper left chamber (left atrium) and the lower left chamber (left ventricle) of the heart. This condition can limit blood flow between the left atrium and the left ventricle. If you have this condition, your health care provider may hear an abnormal sound (heart murmur) while listening to your heart. This condition can range from mild to severe.  What are the causes?  This condition may be caused by:  · Rheumatic fever, which is a complication of a strep infection.  · A buildup of calcium around the valve. This can occur with aging.  · A problem that is present at birth (congenital defect).  · Certain long-term (chronic) diseases.  What are the  signs or symptoms?  Symptoms of this condition include:  · Shortness of breath.  · Cough.  · Noisy breathing (wheezing).  · Tiredness (fatigue) or decreased energy.  · Fast or irregular heartbeats (palpitations).  · Heart murmur.  · Chest pain.  · Pain in the arm, neck, jaw, or face.  · Swollen feet or ankles.  · Hoarse voice.  · Pink and purple patches of skin on the face.  How is this diagnosed?  This condition may be diagnosed based on:  · A physical exam. Your health care provider will check for a heart murmur by listening to your heart.  · Tests, including:  ? A test that creates ultrasound images of the heart that allow your health care provider to see how the heart valves work while your heart is beating (echocardiogram).  ? A test that records the electrical impulses of the heart (electrocardiogram).  ? Exercise stress tests. These are tests that evaluate the blood supply to your heart and your heart's response to exercise.  ? A test to look at the structure and function of the heart (cardiac catheterization). A thin tube (catheter) is passed through the blood vessels and into the heart. Dye is injected into the blood vessels so the cardiac system can be seen on images that are taken.  ? Chest X-rays.  How is this treated?  Treatment for this condition depends on the severity of the condition. It may include:  · Medicines to keep the heart rate regular.  · Medicines to control blood pressure.  · Blood thinners (anticoagulants) to prevent blood clots.  · Antibiotic medicines to prevent infections. Defective heart valves are more likely to become infected.  · A procedure in which a catheter is passed through a blood vessel to the heart and then a tiny balloon is inflated to open the mitral valve (percutaneous balloon valvuloplasty).  · Open heart surgery to repair or replace the mitral valve.  Follow these instructions at home:  Medicines  · Take over-the-counter and prescription medicines only as told by your  health care provider.  · If you were prescribed an antibiotic medicine, take it as told by your health care provider. Do not stop taking the antibiotic even if you start to feel better.  · If you are taking blood thinners:  ? Talk with your health care provider before you take any medicines that contain aspirin or NSAIDs. These medicines increase your risk for dangerous bleeding.  ? Take your medicine exactly as told, at the same time every day.  ? Avoid activities that could cause injury or bruising.  ? Follow instructions about how to prevent falls.  ? Wear a medical alert bracelet or carry a card that lists what medicines you take.  Lifestyle  · Do not use any products that contain nicotine or tobacco, such as cigarettes, e-cigarettes, and chewing tobacco. If you need help quitting, ask your health care provider.  · Achieve and maintain a healthy weight.  · Ask your health care provider what kinds of exercise are safe for you.  · If you are taking blood thinners, avoid activities that have a high risk of injury. Ask your health care provider what activities are safe for you.  Eating and drinking  · Limit your intake of caffeine and alcohol. Both of these substances can affect your heart's rate and rhythm.  · If you drink alcohol:  ? Limit how much you use to:  ? 0-1 drink a day for nonpregnant women.  ? 0-2 drinks a day for men.  ? Be aware of how much alcohol is in your drink. In the [place] one drink equals one 12 oz bottle of beer (355 mL), one 5 oz glass of wine (148 mL), or one 1½ oz glass of hard liquor (44 mL).  · Eat a heart-healthy diet that includes plenty of fresh fruits and vegetables, whole grains, low-fat (lean) proteins, and low-fat or nonfat dairy products. Consider working with a dietitian to help you make healthy food choices.  · Limit the amount of salt (sodium) in your diet. Avoid adding salt to foods, and avoid foods that are high in salt, such as:  ? Pickles.  ? Smoked and cured  meats.  ? Processed foods.    General instructions  · Before any dental procedures, tell your dentist that you have mitral valve stenosis. You may need antibiotics to prevent a heart infection.  · If you plan to become pregnant, talk with your health care provider first.  · Keep all follow-up visits as told by your health care provider. This is important.  Contact a health care provider if you:  · Have a fever.  · Feel more tired than usual when doing physical activity.  · Have a dry cough.  Get help right away if you:  · Have pain or pressure in your chest that does not go away.  · Have difficulty breathing.  · Have palpitations.  · Have a sudden weight gain.  · Have swelling in your feet, ankles, or legs.  · Have trouble staying awake or you faint.  · Feel confused.  These symptoms may represent a serious problem that is an emergency. Do not wait to see if the symptoms will go away. Get medical help right away. Call your local emergency services (911 in the [place] Do not drive yourself to the hospital.  Summary  · Mitral valve stenosis is a narrowing of the mitral valve. This condition can limit blood flow on the left side of your heart.  · Depending on how severe your condition is, you may be treated with medicines, a procedure, or surgery.  · Practice heart-healthy habits to manage this condition. These include limiting alcohol, avoiding nicotine and tobacco, and eating a heart-healthy diet that is low in salt (sodium).  This information is not intended to replace advice given to you by your health care provider. Make sure you discuss any questions you have with your health care provider.  Document Released: 06/07/2011 Document Revised: 12/01/2019 Document Reviewed: 12/01/2019  Elsevier Patient Education © 2020 [employer]    COVID-19  COVID-19 is a respiratory infection that is caused by a virus called severe acute respiratory syndrome coronavirus 2 (SARS-CoV-2). The disease is also known as coronavirus  disease or novel coronavirus. In some people, the virus may not cause any symptoms. In others, it may cause a serious infection. The infection can get worse quickly and can lead to complications, such as:  · Pneumonia, or infection of the lungs.  · Acute respiratory distress syndrome or ARDS. This is fluid build-up in the lungs.  · Acute respiratory failure. This is a condition in which there is not enough oxygen passing from the lungs to the body.  · Sepsis or septic shock. This is a serious bodily reaction to an infection.  · Blood clotting problems.  · Secondary infections due to bacteria or fungus.  The virus that causes COVID-19 is contagious. This means that it can spread from person to person through droplets from coughs and sneezes (respiratory secretions).  What are the causes?  This illness is caused by a virus. You may catch the virus by:  · Breathing in droplets from an infected person's cough or sneeze.  · Touching something, like a table or a doorknob, that was exposed to the virus (contaminated) and then touching your mouth, nose, or eyes.  What increases the risk?  Risk for infection  You are more likely to be infected with this virus if you:  · Live in or travel to an area with a COVID-19 outbreak.  · Come in contact with a sick person who recently traveled to an area with a COVID-19 outbreak.  · Provide care for or live with a person who is infected with COVID-19.  Risk for serious illness  You are more likely to become seriously ill from the virus if you:  · Are 65 years of age or older.  · Have a long-term disease that lowers your body's ability to fight infection (immunocompromised).  · Live in a nursing home or long-term care facility.  · Have a long-term (chronic) disease such as:  ? Chronic lung disease, including chronic obstructive pulmonary disease or asthma  ? Heart disease.  ? Diabetes.  ? Chronic kidney disease.  ? Liver disease.  · Are obese.  What are the signs or symptoms?  Symptoms  of this condition can range from mild to severe. Symptoms may appear any time from 2 to 14 days after being exposed to the virus. They include:  · A fever.  · A cough.  · Difficulty breathing.  · Chills.  · Muscle pains.  · A sore throat.  · Loss of taste or smell.  Some people may also have stomach problems, such as nausea, vomiting, or diarrhea.  Other people may not have any symptoms of COVID-19.  How is this diagnosed?  This condition may be diagnosed based on:  · Your signs and symptoms, especially if:  ? You live in an area with a COVID-19 outbreak.  ? You recently traveled to or from an area where the virus is common.  ? You provide care for or live with a person who was diagnosed with COVID-19.  · A physical exam.  · Lab tests, which may include:  ? A nasal swab to take a sample of fluid from your nose.  ? A throat swab to take a sample of fluid from your throat.  ? A sample of mucus from your lungs (sputum).  ? Blood tests.  · Imaging tests, which may include, X-rays, CT scan, or ultrasound.  How is this treated?  At present, there is no medicine to treat COVID-19. Medicines that treat other diseases are being used on a trial basis to see if they are effective against COVID-19.  Your health care provider will talk with you about ways to treat your symptoms. For most people, the infection is mild and can be managed at home with rest, fluids, and over-the-counter medicines.  Treatment for a serious infection usually takes places in a hospital intensive care unit (ICU). It may include one or more of the following treatments. These treatments are given until your symptoms improve.  · Receiving fluids and medicines through an IV.  · Supplemental oxygen. Extra oxygen is given through a tube in the nose, a face mask, or a arevalo.  · Positioning you to lie on your stomach (prone position). This makes it easier for oxygen to get into the lungs.  · Continuous positive airway pressure (CPAP) or bi-level positive airway  pressure (BPAP) machine. This treatment uses mild air pressure to keep the airways open. A tube that is connected to a motor delivers oxygen to the body.  · Ventilator. This treatment moves air into and out of the lungs by using a tube that is placed in your windpipe.  · Tracheostomy. This is a procedure to create a hole in the neck so that a breathing tube can be inserted.  · Extracorporeal membrane oxygenation (ECMO). This procedure gives the lungs a chance to recover by taking over the functions of the heart and lungs. It supplies oxygen to the body and removes carbon dioxide.  Follow these instructions at home:  Lifestyle  · If you are sick, stay home except to get medical care. Your health care provider will tell you how long to stay home. Call your health care provider before you go for medical care.  · Rest at home as told by your health care provider.  · Do not use any products that contain nicotine or tobacco, such as cigarettes, e-cigarettes, and chewing tobacco. If you need help quitting, ask your health care provider.  · Return to your normal activities as told by your health care provider. Ask your health care provider what activities are safe for you.  General instructions  · Take over-the-counter and prescription medicines only as told by your health care provider.  · Drink enough fluid to keep your urine pale yellow.  · Keep all follow-up visits as told by your health care provider. This is important.  How is this prevented?    There is no vaccine to help prevent COVID-19 infection. However, there are steps you can take to protect yourself and others from this virus.  To protect yourself:   · Do not travel to areas where COVID-19 is a risk. The areas where COVID-19 is reported change often. To identify high-risk areas and travel restrictions, check the CDC travel website: wwwnc.cdc.gov/travel/notices  · If you live in, or must travel to, an area where COVID-19 is a risk, take precautions to avoid  infection.  ? Stay away from people who are sick.  ? Wash your hands often with soap and water for 20 seconds. If soap and water are not available, use an alcohol-based hand .  ? Avoid touching your mouth, face, eyes, or nose.  ? Avoid going out in public, follow guidance from your state and local health authorities.  ? If you must go out in public, wear a cloth face covering or face mask.  ? Disinfect objects and surfaces that are frequently touched every day. This may include:  § Counters and tables.  § Doorknobs and light switches.  § Sinks and faucets.  § Electronics, such as phones, remote controls, keyboards, computers, and tablets.  To protect others:  If you have symptoms of COVID-19, take steps to prevent the virus from spreading to others.  · If you think you have a COVID-19 infection, contact your health care provider right away. Tell your health care team that you think you may have a COVID-19 infection.  · Stay home. Leave your house only to seek medical care. Do not use public transport.  · Do not travel while you are sick.  · Wash your hands often with soap and water for 20 seconds. If soap and water are not available, use alcohol-based hand .  · Stay away from other members of your household. Let healthy household members care for children and pets, if possible. If you have to care for children or pets, wash your hands often and wear a mask. If possible, stay in your own room, separate from others. Use a different bathroom.  · Make sure that all people in your household wash their hands well and often.  · Cough or sneeze into a tissue or your sleeve or elbow. Do not cough or sneeze into your hand or into the air.  · Wear a cloth face covering or face mask.  Where to find more information  · Centers for Disease Control and Prevention: www.cdc.gov/coronavirus/2019-ncov/index.html  · World Health Organization: www.who.int/health-topics/coronavirus  Contact a health care provider  if:  · You live in or have traveled to an area where COVID-19 is a risk and you have symptoms of the infection.  · You have had contact with someone who has COVID-19 and you have symptoms of the infection.  Get help right away if:  · You have trouble breathing.  · You have pain or pressure in your chest.  · You have confusion.  · You have bluish lips and fingernails.  · You have difficulty waking from sleep.  · You have symptoms that get worse.  These symptoms may represent a serious problem that is an emergency. Do not wait to see if the symptoms will go away. Get medical help right away. Call your local emergency services (911 in the [place] Do not drive yourself to the hospital. Let the emergency medical personnel know if you think you have COVID-19.  Summary  · COVID-19 is a respiratory infection that is caused by a virus. It is also known as coronavirus disease or novel coronavirus. It can cause serious infections, such as pneumonia, acute respiratory distress syndrome, acute respiratory failure, or sepsis.  · The virus that causes COVID-19 is contagious. This means that it can spread from person to person through droplets from coughs and sneezes.  · You are more likely to develop a serious illness if you are 65 years of age or older, have a weak immunity, live in a nursing home, or have chronic disease.  · There is no medicine to treat COVID-19. Your health care provider will talk with you about ways to treat your symptoms.  · Take steps to protect yourself and others from infection. Wash your hands often and disinfect objects and surfaces that are frequently touched every day. Stay away from people who are sick and wear a mask if you are sick.  This information is not intended to replace advice given to you by your health care provider. Make sure you discuss any questions you have with your health care provider.  Document Released: 01/23/2020 Document Revised: 05/14/2020 Document Reviewed: 01/23/2020  [employer]  Patient Education © 2020 [employer]

## 2020-12-16 NOTE — DISCHARGE PLANNING
Care Transition Team Assessment  Pt's NOK is her  Kateryna. They share a cell phone.     Pt has only been in [place] for a few days. She just arrived here from [facility] and does not have any type of greencard/visa.. She is not a [place] citizen so she will not qualify for Medicaid. Pt's  arrived here first and established himself but she is unaware of his immigration status or if he has insurance through employment. She is unemployed and is unsure of her husbands employment/income. According to Pt she just arrived here and doesn't have her address memorized so she cannot give it to LSW. Her  has a cell phone which she will also be using. LSW discussed the importance of following up with cardiologist due to her heart condition. Pt stated that she understands and will follow up. She agreed to LSW sending her information to [facility] sot hat they can continue following up with her to ensure she gets seen by cardiology and to help with any insurance possibilities.           Information Source  Orientation : Oriented x 4  Information Given By: Patient  Who is responsible for making decisions for patient? : Patient    Readmission Evaluation  Is this a readmission?: No    Elopement Risk  Legal Hold: No  Ambulatory or Self Mobile in Wheelchair: No-Not an Elopement Risk    Interdisciplinary Discharge Planning  Lives with - Patient's Self Care Capacity: Spouse  Support Systems: Spouse / Significant Other  Housing / Facility: Unable To Determine At This Time  Able to Return to Previous ADL's: Yes  Mobility Issues: No  Prior Services: None  Patient Prefers to be Discharged to:: Home  Assistance Needed: No  Durable Medical Equipment: Not Applicable    Discharge Preparedness  What is your plan after discharge?: Home with help  What are your discharge supports?: Spouse  Prior Functional Level: Ambulatory, Independent with Activities of Daily Living, Independent with Medication Management  Difficulity  with ADLs: None  Difficulity with IADLs: None    Functional Assesment  Prior Functional Level: Ambulatory, Independent with Activities of Daily Living, Independent with Medication Management    Finances  Prescription Coverage: No              Advance Directive  Advance Directive?: None    Domestic Abuse  Have you ever been the victim of abuse or violence?: No    Psychological Assessment  History of Substance Abuse: None  History of Psychiatric Problems: No  Non-compliant with Treatment: No  Newly Diagnosed Illness: No    Discharge Risks or Barriers  Discharge risks or barriers?: No PCP, Uninsured / underinsured, Post-acute placement / services, Complex medical needs, Other (comment)(Pt is newly arrived to country)  Patient risk factors: Complex medical needs, Lack of outside supports, No PCP    Anticipated Discharge Information  Discharge Disposition: Discharged to home/self care (01)  Discharge Address: Unknown  Discharge Contact Phone Number: [phone]

## 2020-12-16 NOTE — CONSULTS
Cardiology Initial Consultation    Date of Service  12/15/2020    Referring Physician  Jose Cortez M.D.    Reason for Consultation  Mitral stenosis    History of Presenting Illness  Carolyn Hauser is a 40 y.o. female with no known cardiac history who presented 12/14/2020 with dyspnea.  Patient reports being in her usual state of health till about 2 days prior to presentation when she started having left-sided/substernal nonradiating chest pressure associated with dyspnea that would occur at rest and with exertion.  Her chest discomfort was essentially constant for 2 days and resolved after she received some medications in the ER.  Her dyspnea history is unclear.  Initially the patient reported that it was intermittent however later on stated that was constant.  She has not had any recurrent chest discomfort or dyspnea since she has been admitted. Patient was found to have COVID-19 on presentation.    She denies any cardiac symptoms prior to 2 days ago.  No history of orthopnea or PND.  No lower extremity edema or abdominal distention.  No weight gain.    Patient moved from [facility] about 2 weeks ago.  She denies any history of rheumatic fever and was never on penicillin that she knows of.  She has 3 children between the ages of 10 and 15 and should not have any cardiac issues during her pregnancies.    Review of Systems  Review of Systems   Constitutional: Negative for malaise/fatigue.   Respiratory: Positive for shortness of breath.    Cardiovascular: Positive for chest pain. Negative for palpitations, orthopnea, leg swelling and PND.   Gastrointestinal: Negative for abdominal pain.   Musculoskeletal: Negative for falls.   Neurological: Negative for dizziness and loss of consciousness.   Psychiatric/Behavioral: Negative for depression.   All other systems reviewed and are negative.      Past Medical History  Reviewed. None    Surgical History  Reviewed. None    Family History  Patient has uncles who have  coronary artery disease.    Social History  Patient is a lifelong non-smoker. No alcohol or recreational drug use.    Home Medications   None       Inpatient Medications  • nitroglycerin  0.4 mg Q5 MIN PRN   • metoprolol  12.5 mg TWICE DAILY   • [START ON 12/16/2020] aspirin EC  81 mg DAILY   • ondansetron  4 mg Q6HRS PRN   • ondansetron  4 mg Q6HRS PRN   • enoxaparin  40 mg DAILY   • acetaminophen  650 mg Q6HRS PRN   • furosemide  20 mg Q DAY       Allergies  No Known Allergies    Vital signs in last 24 hours  Temp:  [36.1 °C (97 °F)-36.4 °C (97.6 °F)] 36.4 °C (97.5 °F)  Pulse:  [] 102  Resp:  [16-20] 16  BP: ()/(62-76) 99/70  SpO2:  [94 %-98 %] 94 %    Physical Exam  Physical Exam   Constitutional: She is oriented to person, place, and time and well-developed, well-nourished, and in no distress. No distress.   HENT:   Head: Normocephalic and atraumatic.   Eyes: Conjunctivae are normal. No scleral icterus.   Neck: Normal range of motion. Neck supple. No JVD present.   Cardiovascular: Normal rate, regular rhythm and intact distal pulses. Exam reveals no gallop and no friction rub.   Murmur heard.   Systolic murmur is present.  Pulmonary/Chest: Effort normal and breath sounds normal. No respiratory distress. She has no wheezes. She has no rales. She exhibits no tenderness.   Abdominal: Soft. She exhibits no distension. There is no abdominal tenderness.   Musculoskeletal: Normal range of motion.         General: No edema.   Neurological: She is alert and oriented to person, place, and time.   Skin: Skin is warm and dry. No rash noted. She is not diaphoretic.   Psychiatric: Mood, memory, affect and judgment normal.   Nursing note and vitals reviewed.      Lab Review  Recent Labs     12/14/20  1557   WBC 8.7   RBC 3.82*   HEMOGLOBIN 11.2*   HEMATOCRIT 35.7*   PLATELETCT 262   MCV 93.5   MPV 10.3     Recent Labs     12/14/20  1557   SODIUM 134*   POTASSIUM 3.9   CHLORIDE 104   CO2 22   GLUCOSE 84   BUN 8    CREATININE 0.88      Lab Results   Component Value Date/Time    TROPONINT 15 12/15/2020 06:14 AM       Lab Results   Component Value Date/Time    ASTSGOT 31 12/14/2020 03:57 PM    ALTSGPT 17 12/14/2020 03:57 PM     No results found for: CHOLSTRLTOT, LDL, HDL, TRIGLYCERIDE      Labs reviewed as noted above. Mild anemia. Normal creatinine    Cardiac Imaging and Procedures Review  EKG performed today at 2:51 AM was personally reviewed and per my interpretation shows sinus tachycardia, right axis deviation, right ventricular hypertrophy, bi-atrial enlargement. Nonspecific T wave changes.    Echocardiogram performed today was personally reviewed and per my interpretation shows preserved LV systolic function. Severely dilated right ventricle with reduced function. Severe tricuspid regurgitation. RVSP 105 mmHg. Severe mitral stenosis with a mean gradient of about 20 mmHg, rate 102 bpm. 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 IVC is dilated and not collapsible.    Assessment/Plan    Severe mitral stenosis:  Pulmonary hypertension:  Dyspnea:  COVID-19 infection:    Patient has new diagnosis of severe mitral stenosis with associated pulmonary hypertension. Surprisingly, she reports being asymptomatic till 2 days prior to admission. Other than dyspnea on exertion, she denies any heart failure symptoms. She is euvolemic appearing on exam. Her new dyspnea could be secondary to her COVID-19 infection.  However her severe mitral stenosis is of concern. She recently moved to a higher [facility] which could also be a reason for her new onset of symptoms. With her pulmonary hypertension, she will likely need to be evaluated for a percutaneous mitral balloon valvuloplasty however her symptoms are unclear and she has COVID-19. Would recommend initiation of diuretics for now. If the patient's symptoms improve, would recommend outpatient follow-up in cardiology clinic for reevaluation.    Patient did not receive Lasix this morning.  Lasix 20 mg IV x1 ordered for this evening.  Monitor response and titrate as needed.  Agree with metoprolol, titrate dose as tolerated by blood pressure.  Patient has pulmonary hypertension however no clear signs of right heart failure clinically. Will hold off on digoxin for now.      Thank you for allowing me to participate in the care of this patient. Please do not hesitate to contact me with any questions.    Velma Briceño MD, [facility]  Cardiologist  [facility]

## 2020-12-16 NOTE — DISCHARGE PLANNING
Meds-to-Beds: Discharge prescription orders listed below delivered to patient's bedside. RN notified. Written information regarding the dispensed prescriptions was provided to the patient including the phone number of the pharmacy to call for any questions.       Samanthanenayogi Annmarieelen   Home Medication Instructions BUCK:[id number]    Printed on:12/16/20 8805   Medication Information                      aspirin 81 MG EC tablet  Take 1 Tab by mouth every day for 30 days.             furosemide (LASIX) 20 MG Tab  Take 1 Tab by mouth every day for 30 days.             metoprolol (LOPRESSOR) 25 MG Tab  Take 0.5 Tabs by mouth every 8 hours for 30 days.             potassium chloride SA (K-DUR) 10 MEQ Tab CR  Take 1 Tab by mouth every day for 30 days.                 Wilbert Vaughan, PharmD

## 2020-12-17 NOTE — PROGRESS NOTES
Community Health Worker Intake    • Social determinates of health intake complete.   • Identified barriers to paying for food & medical care.  • Contact information provided to Carolyn Hauser.  • Has cardiology appointment scheduled for Monday 1/4/21 @ 1:15pm with Marielena GONZALEZ.  • Outpatient assessment completed.  • Did the patient receive medications post discharge: Yes    CHW Alexandra spoke with pt via mobile number introduce CCM services and complete OP assessment. This patient was referred to CCM services by [facility]. Pt and 10 year old son just moved to the  from [facility] to be with her  and brother-in-law. Pt lives with her  and son in an apartment. Pt and  do not have a car. Pt reports her brother-in-law will drive her to her medical appts, the store etc. Pt reports she is not working at this time but her  works full time at [employer] but is not provided any medical insurance through his employer. Pt is not eligible for [place] Medicaid because she is not a  Citizen and does not have a green card yet. Pt reports difficulty paying for basic needs like food and medical care. Pt requested resources for food and medical care.     Plan:  Mailed pt the following resources:   1. [facility] application through [employer].   2. [facility] insurance information to potentially apply for their discount plan.   3. Completed Food Rx so patient could utilize food pantry.    4. Provided [facility] contact info and sliding fee scale application.    5. CCM contact info.     This CHW encourage pt to call CCM services if she has any questions or concerns.

## 2020-12-17 NOTE — PROGRESS NOTES
Cardiology Follow Up Progress Note    Date of Service  12/16/2020    Attending Physician  Dr. Jose Cortez    Reason for consult  Mitral stenosis    HPI  Carolyn Hauser is a 40 y.o. female admitted with dyspnea and chest discomfort and found to have COVID-19 infection.  Also diagnosed with severe mitral stenosis.    Interim Events  Feels well this morning.  Reports that her dyspnea and cough have completely resolved.    Review of Systems  Review of Systems   Constitutional: Negative for malaise/fatigue.   Respiratory: Negative for shortness of breath.    Cardiovascular: Negative for chest pain.   Gastrointestinal: Negative for abdominal pain.   Neurological: Negative for dizziness.   All other systems reviewed and are negative.    Vital signs in last 24 hours  Temp:  [36.1 °C (97 °F)-36.8 °C (98.3 °F)] 36.1 °C (97 °F)  Pulse:  [83-87] 87  Resp:  [18-20] 20  BP: (91-96)/(64-70) 95/65  SpO2:  [95 %-96 %] 96 %    Physical Exam  Physical Exam   Constitutional: She is oriented to person, place, and time and well-developed, well-nourished, and in no distress. No distress.   HENT:   Head: Normocephalic and atraumatic.   Eyes: Conjunctivae are normal.   Neck: Neck supple. No JVD present.   Cardiovascular: Normal rate and regular rhythm. Exam reveals no gallop and no friction rub.   Murmur heard.   Systolic murmur is present.  Pulmonary/Chest: Effort normal and breath sounds normal. No respiratory distress. She has no wheezes. She has no rales.   Abdominal: Soft. She exhibits no distension. There is no abdominal tenderness.   Musculoskeletal:         General: No edema.   Neurological: She is alert and oriented to person, place, and time.   Skin: Skin is warm and dry. She is not diaphoretic.   Psychiatric: Mood, memory, affect and judgment normal.   Nursing note and vitals reviewed.      Lab Review  Recent Labs     12/14/20  1557 12/16/20  0520   WBC 8.7 6.5   RBC 3.82* 3.96*   HEMOGLOBIN 11.2* 11.2*   HEMATOCRIT 35.7* 36.1*    PLATELETCT 262 305     Recent Labs     12/14/20  1557 12/16/20  0520   SODIUM 134* 132*   POTASSIUM 3.9 4.0   CHLORIDE 104 100   CO2 22 25   GLUCOSE 84 85   BUN 8 15   CREATININE 0.88 0.98      Recent Labs     12/14/20  1557 12/16/20  0520   ALTSGPT 17 13   ASTSGOT 31 24   ALKPHOSPHAT 101* 96   TBILIRUBIN 1.1 0.8   DBILIRUBIN  --  0.3   GLUCOSE 84 85      Recent Labs     12/16/20  0520   INR 1.36*      Lab Results   Component Value Date/Time    TROPONINT 15 12/15/2020 06:14 AM         Labs reviewed as noted above.  Mild anemia.  Normal creatinine.    Cardiac Imaging and Procedures Review  Telemetry reviewed and showed sinus rhythm/sinus tachycardia.    Assessment/Plan    Severe mitral stenosis:  Pulmonary hypertension:  Dyspnea:  COVID-19 infection:       New diagnosis of mitral stenosis with associated pulmonary hypertension.  As discussed previously, patient does not have any symptoms to suggest right heart failure.  Her dyspnea could be secondary to her COVID-19 infection.  Symptoms could also be secondary to her recent move to a higher elevation with her underlying mitral stenosis.    Patient feels well and wants to go home.  Okay to discharge from a cardiac standpoint.  We will arrange for follow-up as an outpatient.  Given the severity of her mitral stenosis and pulmonary hypertension, she would likely need a percutaneous mitral balloon valvuloplasty as an outpatient.  Would recommend discharge on metoprolol, dose increased to 12.5 mg 3 times daily today.  Agree with low-dose Lasix as well.    Primary team, Dr. Cortez was personally updated.     Will sign off. Please call with questions. Thank you.     I have performed a physical exam and reviewed and updated ROS as of today, 12/16/2020.  In review of yesterday's note dated, 12/15/2020, there are no changes except as documented above.    Velma Briceño MD, [facility]  Cardiologist  [facility]

## 2020-12-27 NOTE — PROGRESS NOTES
Chief Complaint   Patient presents with   • Mitral Valve Prolapse     F/V Dx: Mitral stenosis       Subjective:   Carolyn Hauser is a 40 y.o. female patient of Dr. Velma Briceño who presents today for hospital follow up.    Patient was admitted on 12/14/2020 with dyspnea and chest discomfort. She was found to be positivie for Covid-19 as well to have severe mitral stenosis and pulmonary hypertension. It was felt patient would most likely need a percutaneous mitral balloon valvuloplasty as an outpatient once full recovered from her Covid-19 infection. She was discharged on 12/16/2020 on ASA, lasix and metoprolol.     Today patient states that she continues to have significant SOB and increased lower leg edema. States that she ran out of Metoprolol about 4-5 days ago.     History reviewed. No pertinent past medical history.  History reviewed. No pertinent surgical history.  History reviewed. No pertinent family history.  Social History     Socioeconomic History   • Marital status: Unknown     Spouse name: Not on file   • Number of children: Not on file   • Years of education: Not on file   • Highest education level: Not on file   Occupational History   • Not on file   Social Needs   • Financial resource strain: Somewhat hard   • Food insecurity     Worry: Sometimes true     Inability: Sometimes true   • Transportation needs     Medical: No     Non-medical: No   Tobacco Use   • Smoking status: Never Smoker   • Smokeless tobacco: Current User   • Tobacco comment: chews tobacco   Substance and Sexual Activity   • Alcohol use: Not Currently   • Drug use: Not Currently   • Sexual activity: Not on file   Lifestyle   • Physical activity     Days per week: Not on file     Minutes per session: Not on file   • Stress: Not on file   Relationships   • Social connections     Talks on phone: Not on file     Gets together: Not on file     Attends Yazdanism service: Not on file     Active member of club or organization: Not on file     " Attends meetings of clubs or organizations: Not on file     Relationship status: Not on file   • Intimate partner violence     Fear of current or ex partner: Not on file     Emotionally abused: Not on file     Physically abused: Not on file     Forced sexual activity: Not on file   Other Topics Concern   • Not on file   Social History Narrative   • Not on file     No Known Allergies  Outpatient Encounter Medications as of 1/4/2021   Medication Sig Dispense Refill   • potassium chloride SA (K-DUR) 10 MEQ Tab CR Take 1 Tab by mouth 2 times a day. 60 Tab 0   • furosemide (LASIX) 20 MG Tab Take 2 Tabs by mouth 2 times a day. 60 Tab 0   • metoprolol (LOPRESSOR) 25 MG Tab Take 0.5 Tabs by mouth 2 times a day. 45 Tab 0   • aspirin 81 MG EC tablet Take 1 Tab by mouth every day for 30 days. 30 Tab 1   • [DISCONTINUED] metoprolol (LOPRESSOR) 25 MG Tab Take 0.5 Tabs by mouth every 8 hours for 30 days. 45 Tab 1   • [DISCONTINUED] furosemide (LASIX) 20 MG Tab Take 1 Tab by mouth every day for 30 days. 60 Tab 0   • [DISCONTINUED] potassium chloride SA (K-DUR) 10 MEQ Tab CR Take 1 Tab by mouth every day for 30 days. 30 Each 0     No facility-administered encounter medications on file as of 1/4/2021.      Review of Systems   Constitutional: Negative for malaise/fatigue and weight loss.   Respiratory: Positive for shortness of breath (At baseline ).    Cardiovascular: Negative for chest pain, palpitations, orthopnea, claudication, leg swelling and PND.   Neurological: Negative for dizziness and weakness.   All other systems reviewed and are negative.       Objective:   /64 (BP Location: Left arm, Patient Position: Sitting, BP Cuff Size: Adult)   Pulse (!) 114   Resp (!) 24   Ht 1.626 m (5' 4\")   Wt 63.3 kg (139 lb 9.6 oz)   SpO2 97%   BMI 23.96 kg/m²     Physical Exam   Constitutional: She is oriented to person, place, and time. She appears well-developed and well-nourished. No distress.   HENT:   Head: Normocephalic. "   Eyes: EOM are normal.   Neck: JVD present.   Cardiovascular: Normal rate and regular rhythm.   Murmur heard.   Systolic murmur is present with a grade of 4/6.  Pulmonary/Chest: No respiratory distress.   Bibasilar crackles   Abdominal: Soft. There is no abdominal tenderness.   Musculoskeletal:         General: Edema present.   Neurological: She is alert and oriented to person, place, and time.   Skin: Skin is warm and dry.   Psychiatric: She has a normal mood and affect. Her behavior is normal.       Assessment:     1. Tachycardia  EKG   2. Nonrheumatic mitral valve stenosis  REFERRAL TO CARDIOTHORACIC SURGERY   3. Pulmonary hypertension (HCC)         Medical Decision Making:  Today's Assessment / Status / Plan:     Patient appears to be in acute valvular heart failure. We discussed concern managing this outpatient as her BP is low and her resources are limited, patient declines admission.     Will increase her Lasix to 20 mg BID from daily and KCL 10 mEq BID from daily.     Refilled lopressor, advised patient to resume at 12.5 mg BID.     Continue ASA 81 mg daily.     Referral placed to cardiothoracic surgery.     Patient will follow up with myself in one week or earlier if needed. Encouraged patient to contact our office should any questions or concerns arise in the mean time. Patient understands and agrees with the plan of care.     Future Appointments   Date Time Provider Department Center   1/11/2021  9:15 AM LUZ Pruitt. [facility] None

## 2021-01-01 ENCOUNTER — APPOINTMENT (OUTPATIENT)
Dept: RADIOLOGY | Facility: MEDICAL CENTER | Age: 41
DRG: 215 | End: 2021-01-01
Attending: THORACIC SURGERY (CARDIOTHORACIC VASCULAR SURGERY)
Payer: OTHER GOVERNMENT

## 2021-01-01 ENCOUNTER — HOSPITAL ENCOUNTER (INPATIENT)
Facility: MEDICAL CENTER | Age: 41
LOS: 7 days | DRG: 215 | End: 2021-02-10
Attending: EMERGENCY MEDICINE | Admitting: STUDENT IN AN ORGANIZED HEALTH CARE EDUCATION/TRAINING PROGRAM
Payer: OTHER GOVERNMENT

## 2021-01-01 ENCOUNTER — OFFICE VISIT (OUTPATIENT)
Dept: CARDIOLOGY | Facility: MEDICAL CENTER | Age: 41
End: 2021-01-01
Payer: OTHER GOVERNMENT

## 2021-01-01 ENCOUNTER — HOSPITAL ENCOUNTER (OUTPATIENT)
Facility: MEDICAL CENTER | Age: 41
End: 2021-01-01
Attending: THORACIC SURGERY (CARDIOTHORACIC VASCULAR SURGERY) | Admitting: THORACIC SURGERY (CARDIOTHORACIC VASCULAR SURGERY)

## 2021-01-01 ENCOUNTER — APPOINTMENT (OUTPATIENT)
Dept: CARDIOLOGY | Facility: MEDICAL CENTER | Age: 41
DRG: 215 | End: 2021-01-01
Attending: INTERNAL MEDICINE
Payer: OTHER GOVERNMENT

## 2021-01-01 ENCOUNTER — APPOINTMENT (OUTPATIENT)
Dept: RADIOLOGY | Facility: MEDICAL CENTER | Age: 41
DRG: 215 | End: 2021-01-01
Attending: NURSE PRACTITIONER
Payer: OTHER GOVERNMENT

## 2021-01-01 ENCOUNTER — PHARMACY VISIT (OUTPATIENT)
Dept: PHARMACY | Facility: MEDICAL CENTER | Age: 41
End: 2021-01-01
Payer: COMMERCIAL

## 2021-01-01 ENCOUNTER — APPOINTMENT (OUTPATIENT)
Dept: CARDIOLOGY | Facility: MEDICAL CENTER | Age: 41
DRG: 215 | End: 2021-01-01
Attending: EMERGENCY MEDICINE
Payer: OTHER GOVERNMENT

## 2021-01-01 ENCOUNTER — APPOINTMENT (OUTPATIENT)
Dept: RADIOLOGY | Facility: MEDICAL CENTER | Age: 41
DRG: 215 | End: 2021-01-01
Attending: CLINICAL NURSE SPECIALIST

## 2021-01-01 ENCOUNTER — APPOINTMENT (OUTPATIENT)
Dept: CARDIOLOGY | Facility: MEDICAL CENTER | Age: 41
DRG: 215 | End: 2021-01-01
Attending: ANESTHESIOLOGY
Payer: OTHER GOVERNMENT

## 2021-01-01 ENCOUNTER — APPOINTMENT (OUTPATIENT)
Dept: RADIOLOGY | Facility: MEDICAL CENTER | Age: 41
DRG: 215 | End: 2021-01-01
Attending: INTERNAL MEDICINE
Payer: OTHER GOVERNMENT

## 2021-01-01 ENCOUNTER — APPOINTMENT (OUTPATIENT)
Dept: RADIOLOGY | Facility: MEDICAL CENTER | Age: 41
DRG: 215 | End: 2021-01-01
Attending: EMERGENCY MEDICINE
Payer: OTHER GOVERNMENT

## 2021-01-01 ENCOUNTER — APPOINTMENT (OUTPATIENT)
Dept: RADIOLOGY | Facility: MEDICAL CENTER | Age: 41
DRG: 215 | End: 2021-01-01
Attending: INTERNAL MEDICINE

## 2021-01-01 ENCOUNTER — ANESTHESIA EVENT (OUTPATIENT)
Dept: SURGERY | Facility: MEDICAL CENTER | Age: 41
DRG: 215 | End: 2021-01-01

## 2021-01-01 ENCOUNTER — APPOINTMENT (OUTPATIENT)
Dept: CARDIOLOGY | Facility: MEDICAL CENTER | Age: 41
DRG: 215 | End: 2021-01-01
Attending: NURSE PRACTITIONER
Payer: OTHER GOVERNMENT

## 2021-01-01 ENCOUNTER — APPOINTMENT (OUTPATIENT)
Dept: CARDIOTHORACIC SURGERY | Facility: MEDICAL CENTER | Age: 41
End: 2021-01-01
Payer: OTHER GOVERNMENT

## 2021-01-01 ENCOUNTER — OFFICE VISIT (OUTPATIENT)
Dept: CARDIOTHORACIC SURGERY | Facility: MEDICAL CENTER | Age: 41
End: 2021-01-01

## 2021-01-01 ENCOUNTER — ANESTHESIA (OUTPATIENT)
Dept: SURGERY | Facility: MEDICAL CENTER | Age: 41
DRG: 215 | End: 2021-01-01

## 2021-01-01 ENCOUNTER — APPOINTMENT (OUTPATIENT)
Dept: RADIOLOGY | Facility: MEDICAL CENTER | Age: 41
DRG: 215 | End: 2021-01-01
Attending: STUDENT IN AN ORGANIZED HEALTH CARE EDUCATION/TRAINING PROGRAM
Payer: OTHER GOVERNMENT

## 2021-01-01 ENCOUNTER — APPOINTMENT (OUTPATIENT)
Dept: RADIOLOGY | Facility: MEDICAL CENTER | Age: 41
DRG: 215 | End: 2021-01-01
Attending: PHYSICIAN ASSISTANT
Payer: OTHER GOVERNMENT

## 2021-01-01 VITALS
HEART RATE: 114 BPM | TEMPERATURE: 97 F | WEIGHT: 145 LBS | SYSTOLIC BLOOD PRESSURE: 96 MMHG | HEIGHT: 64 IN | OXYGEN SATURATION: 96 % | BODY MASS INDEX: 24.75 KG/M2 | DIASTOLIC BLOOD PRESSURE: 60 MMHG

## 2021-01-01 VITALS
HEIGHT: 64 IN | WEIGHT: 154.32 LBS | TEMPERATURE: 98.6 F | BODY MASS INDEX: 26.35 KG/M2 | SYSTOLIC BLOOD PRESSURE: 75 MMHG | RESPIRATION RATE: 30 BRPM | OXYGEN SATURATION: 15 % | HEART RATE: 49 BPM | DIASTOLIC BLOOD PRESSURE: 38 MMHG

## 2021-01-01 VITALS
WEIGHT: 138.4 LBS | RESPIRATION RATE: 14 BRPM | SYSTOLIC BLOOD PRESSURE: 100 MMHG | HEIGHT: 64 IN | BODY MASS INDEX: 23.63 KG/M2 | HEART RATE: 86 BPM | OXYGEN SATURATION: 100 % | DIASTOLIC BLOOD PRESSURE: 62 MMHG

## 2021-01-01 VITALS
BODY MASS INDEX: 23.83 KG/M2 | DIASTOLIC BLOOD PRESSURE: 64 MMHG | OXYGEN SATURATION: 97 % | SYSTOLIC BLOOD PRESSURE: 102 MMHG | HEART RATE: 114 BPM | HEIGHT: 64 IN | WEIGHT: 139.6 LBS | RESPIRATION RATE: 24 BRPM

## 2021-01-01 DIAGNOSIS — Z95.2 S/P MITRAL VALVE REPLACEMENT: ICD-10-CM

## 2021-01-01 DIAGNOSIS — I27.20 PULMONARY HYPERTENSION (HCC): ICD-10-CM

## 2021-01-01 DIAGNOSIS — E87.79 CARDIAC VOLUME OVERLOAD: ICD-10-CM

## 2021-01-01 DIAGNOSIS — R00.0 TACHYCARDIA: ICD-10-CM

## 2021-01-01 DIAGNOSIS — E87.1 HYPONATREMIA: ICD-10-CM

## 2021-01-01 DIAGNOSIS — I95.9 HYPOTENSION, UNSPECIFIED HYPOTENSION TYPE: ICD-10-CM

## 2021-01-01 DIAGNOSIS — I34.2 NONRHEUMATIC MITRAL VALVE STENOSIS: ICD-10-CM

## 2021-01-01 DIAGNOSIS — E16.2 HYPOGLYCEMIA: ICD-10-CM

## 2021-01-01 DIAGNOSIS — I05.0 MITRAL VALVE STENOSIS, SEVERE: ICD-10-CM

## 2021-01-01 DIAGNOSIS — D68.9 COAGULOPATHY (HCC): ICD-10-CM

## 2021-01-01 DIAGNOSIS — I05.0 MITRAL VALVE STENOSIS, UNSPECIFIED ETIOLOGY: ICD-10-CM

## 2021-01-01 DIAGNOSIS — N17.9 AKI (ACUTE KIDNEY INJURY) (HCC): ICD-10-CM

## 2021-01-01 DIAGNOSIS — I50.9 ACUTE ON CHRONIC CONGESTIVE HEART FAILURE, UNSPECIFIED HEART FAILURE TYPE (HCC): ICD-10-CM

## 2021-01-01 LAB
ABO + RH BLD: NORMAL
ABO GROUP BLD: NORMAL
ACT BLD: 136 SEC (ref 74–137)
ACT BLD: 142 SEC (ref 74–137)
ACT BLD: 147 SEC (ref 74–137)
ACT BLD: 274 SEC (ref 74–137)
ACT BLD: 412 SEC (ref 74–137)
ACT BLD: 494 SEC (ref 74–137)
ACT BLD: 510 SEC (ref 74–137)
ACT BLD: 510 SEC (ref 74–137)
ACT BLD: 543 SEC (ref 74–137)
ACT BLD: 593 SEC (ref 74–137)
ACT BLD: 604 SEC (ref 74–137)
ACTION RANGE TRIGGERED IACRT: NO
ACTION RANGE TRIGGERED IACRT: YES
ALBUMIN SERPL BCP-MCNC: 1.4 G/DL (ref 3.2–4.9)
ALBUMIN SERPL BCP-MCNC: 2.6 G/DL (ref 3.2–4.9)
ALBUMIN SERPL BCP-MCNC: 2.6 G/DL (ref 3.2–4.9)
ALBUMIN SERPL BCP-MCNC: 2.7 G/DL (ref 3.2–4.9)
ALBUMIN SERPL BCP-MCNC: 2.8 G/DL (ref 3.2–4.9)
ALBUMIN/GLOB SERPL: 0.5 G/DL
ALBUMIN/GLOB SERPL: 0.5 G/DL
ALBUMIN/GLOB SERPL: 0.6 G/DL
ALBUMIN/GLOB SERPL: 0.8 G/DL
ALBUMIN/GLOB SERPL: ABNORMAL G/DL
ALP SERPL-CCNC: 124 U/L (ref 30–99)
ALP SERPL-CCNC: 53 U/L (ref 30–99)
ALP SERPL-CCNC: 67 U/L (ref 30–99)
ALP SERPL-CCNC: 88 U/L (ref 30–99)
ALP SERPL-CCNC: 95 U/L (ref 30–99)
ALT SERPL-CCNC: 11 U/L (ref 2–50)
ALT SERPL-CCNC: 18 U/L (ref 2–50)
ALT SERPL-CCNC: 20 U/L (ref 2–50)
ALT SERPL-CCNC: 27 U/L (ref 2–50)
ALT SERPL-CCNC: 39 U/L (ref 2–50)
AMYLASE SERPL-CCNC: 55 U/L (ref 20–103)
ANION GAP SERPL CALC-SCNC: 10 MMOL/L (ref 7–16)
ANION GAP SERPL CALC-SCNC: 10 MMOL/L (ref 7–16)
ANION GAP SERPL CALC-SCNC: 11 MMOL/L (ref 7–16)
ANION GAP SERPL CALC-SCNC: 12 MMOL/L (ref 7–16)
ANION GAP SERPL CALC-SCNC: 12 MMOL/L (ref 7–16)
ANION GAP SERPL CALC-SCNC: 13 MMOL/L (ref 7–16)
ANION GAP SERPL CALC-SCNC: 15 MMOL/L (ref 7–16)
ANION GAP SERPL CALC-SCNC: 16 MMOL/L (ref 7–16)
ANION GAP SERPL CALC-SCNC: 27 MMOL/L (ref 7–16)
ANION GAP SERPL CALC-SCNC: 31 MMOL/L (ref 7–16)
ANION GAP SERPL CALC-SCNC: 8 MMOL/L (ref 7–16)
ANION GAP SERPL CALC-SCNC: 9 MMOL/L (ref 7–16)
ANION GAP SERPL CALC-SCNC: 9 MMOL/L (ref 7–16)
APPEARANCE UR: ABNORMAL
APPEARANCE UR: CLEAR
APTT PPP: 42.4 SEC (ref 24.7–36)
APTT PPP: 43.1 SEC (ref 24.7–36)
APTT PPP: 43.9 SEC (ref 24.7–36)
APTT PPP: 69.6 SEC (ref 24.7–36)
APTT PPP: >240 SEC (ref 24.7–36)
AST SERPL-CCNC: 32 U/L (ref 12–45)
AST SERPL-CCNC: 38 U/L (ref 12–45)
AST SERPL-CCNC: 39 U/L (ref 12–45)
AST SERPL-CCNC: 80 U/L (ref 12–45)
AST SERPL-CCNC: 82 U/L (ref 12–45)
BACTERIA #/AREA URNS HPF: NEGATIVE /HPF
BACTERIA #/AREA URNS HPF: NEGATIVE /HPF
BACTERIA BLD CULT: NORMAL
BACTERIA BLD CULT: NORMAL
BACTERIA UR CULT: NORMAL
BARCODED ABORH UBTYP: 5100
BARCODED ABORH UBTYP: 600
BARCODED ABORH UBTYP: 6200
BARCODED ABORH UBTYP: 7300
BARCODED ABORH UBTYP: 8400
BARCODED ABORH UBTYP: 8400
BARCODED PRD CODE UBPRD: NORMAL
BARCODED UNIT NUM UBUNT: NORMAL
BASE EXCESS BLDA CALC-SCNC: -1 MMOL/L (ref -4–3)
BASE EXCESS BLDA CALC-SCNC: -12 MMOL/L (ref -4–3)
BASE EXCESS BLDA CALC-SCNC: -18 MMOL/L (ref -4–3)
BASE EXCESS BLDA CALC-SCNC: -2 MMOL/L (ref -4–3)
BASE EXCESS BLDA CALC-SCNC: -2 MMOL/L (ref -4–3)
BASE EXCESS BLDA CALC-SCNC: -21 MMOL/L (ref -4–3)
BASE EXCESS BLDA CALC-SCNC: -3 MMOL/L (ref -4–3)
BASE EXCESS BLDA CALC-SCNC: -5 MMOL/L (ref -4–3)
BASE EXCESS BLDA CALC-SCNC: -5 MMOL/L (ref -4–3)
BASE EXCESS BLDA CALC-SCNC: -7 MMOL/L (ref -4–3)
BASE EXCESS BLDA CALC-SCNC: -7 MMOL/L (ref -4–3)
BASE EXCESS BLDA CALC-SCNC: -8 MMOL/L (ref -4–3)
BASE EXCESS BLDA CALC-SCNC: -9 MMOL/L (ref -4–3)
BASE EXCESS BLDA CALC-SCNC: 0 MMOL/L (ref -4–3)
BASE EXCESS BLDA CALC-SCNC: 0 MMOL/L (ref -4–3)
BASE EXCESS BLDV CALC-SCNC: -2 MMOL/L (ref -4–3)
BASE EXCESS BLDV CALC-SCNC: -4 MMOL/L (ref -4–3)
BASE EXCESS BLDV CALC-SCNC: -6 MMOL/L (ref -4–3)
BASOPHILS # BLD AUTO: 0.4 % (ref 0–1.8)
BASOPHILS # BLD AUTO: 0.5 % (ref 0–1.8)
BASOPHILS # BLD AUTO: 0.7 % (ref 0–1.8)
BASOPHILS # BLD AUTO: 0.8 % (ref 0–1.8)
BASOPHILS # BLD AUTO: 0.8 % (ref 0–1.8)
BASOPHILS # BLD AUTO: 1.1 % (ref 0–1.8)
BASOPHILS # BLD AUTO: 1.2 % (ref 0–1.8)
BASOPHILS # BLD AUTO: 1.3 % (ref 0–1.8)
BASOPHILS # BLD: 0.04 K/UL (ref 0–0.12)
BASOPHILS # BLD: 0.04 K/UL (ref 0–0.12)
BASOPHILS # BLD: 0.05 K/UL (ref 0–0.12)
BASOPHILS # BLD: 0.05 K/UL (ref 0–0.12)
BASOPHILS # BLD: 0.06 K/UL (ref 0–0.12)
BASOPHILS # BLD: 0.08 K/UL (ref 0–0.12)
BILIRUB SERPL-MCNC: 2.2 MG/DL (ref 0.1–1.5)
BILIRUB SERPL-MCNC: 2.7 MG/DL (ref 0.1–1.5)
BILIRUB SERPL-MCNC: 4 MG/DL (ref 0.1–1.5)
BILIRUB SERPL-MCNC: 4.1 MG/DL (ref 0.1–1.5)
BILIRUB SERPL-MCNC: 5.7 MG/DL (ref 0.1–1.5)
BILIRUB UR QL STRIP.AUTO: ABNORMAL
BILIRUB UR QL STRIP.AUTO: NEGATIVE
BLD GP AB SCN SERPL QL: NORMAL
BODY TEMPERATURE: ABNORMAL DEGREES
BUN SERPL-MCNC: 25 MG/DL (ref 8–22)
BUN SERPL-MCNC: 27 MG/DL (ref 8–22)
BUN SERPL-MCNC: 28 MG/DL (ref 8–22)
BUN SERPL-MCNC: 29 MG/DL (ref 8–22)
BUN SERPL-MCNC: 30 MG/DL (ref 8–22)
BUN SERPL-MCNC: 31 MG/DL (ref 8–22)
BUN SERPL-MCNC: 31 MG/DL (ref 8–22)
BUN SERPL-MCNC: 32 MG/DL (ref 8–22)
BUN SERPL-MCNC: 32 MG/DL (ref 8–22)
BUN SERPL-MCNC: 33 MG/DL (ref 8–22)
BUN SERPL-MCNC: 35 MG/DL (ref 8–22)
BUN SERPL-MCNC: 35 MG/DL (ref 8–22)
BUN SERPL-MCNC: 40 MG/DL (ref 8–22)
CA-I BLD ISE-SCNC: 0.97 MMOL/L (ref 1.1–1.3)
CA-I BLD ISE-SCNC: 0.98 MMOL/L (ref 1.1–1.3)
CA-I BLD ISE-SCNC: 1.01 MMOL/L (ref 1.1–1.3)
CA-I BLD ISE-SCNC: 1.02 MMOL/L (ref 1.1–1.3)
CA-I BLD ISE-SCNC: 1.04 MMOL/L (ref 1.1–1.3)
CA-I BLD ISE-SCNC: 1.05 MMOL/L (ref 1.1–1.3)
CA-I BLD ISE-SCNC: 1.06 MMOL/L (ref 1.1–1.3)
CA-I BLD ISE-SCNC: 1.11 MMOL/L (ref 1.1–1.3)
CA-I BLD ISE-SCNC: 1.12 MMOL/L (ref 1.1–1.3)
CA-I BLD ISE-SCNC: 1.14 MMOL/L (ref 1.1–1.3)
CA-I BLD ISE-SCNC: 1.29 MMOL/L (ref 1.1–1.3)
CA-I BLD ISE-SCNC: 1.67 MMOL/L (ref 1.1–1.3)
CALCIUM SERPL-MCNC: 7 MG/DL (ref 8.5–10.5)
CALCIUM SERPL-MCNC: 7.6 MG/DL (ref 8.5–10.5)
CALCIUM SERPL-MCNC: 7.7 MG/DL (ref 8.5–10.5)
CALCIUM SERPL-MCNC: 7.7 MG/DL (ref 8.5–10.5)
CALCIUM SERPL-MCNC: 7.9 MG/DL (ref 8.5–10.5)
CALCIUM SERPL-MCNC: 7.9 MG/DL (ref 8.5–10.5)
CALCIUM SERPL-MCNC: 8 MG/DL (ref 8.5–10.5)
CALCIUM SERPL-MCNC: 8.1 MG/DL (ref 8.5–10.5)
CALCIUM SERPL-MCNC: 8.1 MG/DL (ref 8.5–10.5)
CALCIUM SERPL-MCNC: 8.2 MG/DL (ref 8.5–10.5)
CALCIUM SERPL-MCNC: 8.2 MG/DL (ref 8.5–10.5)
CALCIUM SERPL-MCNC: 8.3 MG/DL (ref 8.5–10.5)
CALCIUM SERPL-MCNC: 8.4 MG/DL (ref 8.5–10.5)
CALCIUM SERPL-MCNC: 8.8 MG/DL (ref 8.5–10.5)
CFT BLD TEG: 4.7 MIN (ref 5–10)
CFT BLD TEG: 7.5 MIN (ref 5–10)
CFT P HPASE BLD TEG: 7.3 MIN (ref 5–10)
CHLORIDE SERPL-SCNC: 101 MMOL/L (ref 96–112)
CHLORIDE SERPL-SCNC: 86 MMOL/L (ref 96–112)
CHLORIDE SERPL-SCNC: 91 MMOL/L (ref 96–112)
CHLORIDE SERPL-SCNC: 91 MMOL/L (ref 96–112)
CHLORIDE SERPL-SCNC: 92 MMOL/L (ref 96–112)
CHLORIDE SERPL-SCNC: 93 MMOL/L (ref 96–112)
CHLORIDE SERPL-SCNC: 93 MMOL/L (ref 96–112)
CHLORIDE SERPL-SCNC: 94 MMOL/L (ref 96–112)
CHLORIDE SERPL-SCNC: 94 MMOL/L (ref 96–112)
CHLORIDE SERPL-SCNC: 96 MMOL/L (ref 96–112)
CHLORIDE SERPL-SCNC: 98 MMOL/L (ref 96–112)
CHLORIDE SERPL-SCNC: 99 MMOL/L (ref 96–112)
CK SERPL-CCNC: 111 U/L (ref 0–154)
CLOT ANGLE BLD TEG: 55.7 DEGREES (ref 53–72)
CLOT ANGLE BLD TEG: 73.3 DEGREES (ref 53–72)
CLOT ANGLE P HPASE BLD TEG: 72.5 DEGREES (ref 53–72)
CLOT INIT P HPASE BLD TEG: 1.2 MIN (ref 1–3)
CLOT LYSIS 30M P MA LENFR BLD TEG: 0 % (ref 0–8)
CO2 BLDA-SCNC: 12 MMOL/L (ref 20–33)
CO2 BLDA-SCNC: 15 MMOL/L (ref 20–33)
CO2 BLDA-SCNC: 16 MMOL/L (ref 20–33)
CO2 BLDA-SCNC: 19 MMOL/L (ref 20–33)
CO2 BLDA-SCNC: 20 MMOL/L (ref 20–33)
CO2 BLDA-SCNC: 21 MMOL/L (ref 20–33)
CO2 BLDA-SCNC: 22 MMOL/L (ref 20–33)
CO2 BLDA-SCNC: 22 MMOL/L (ref 20–33)
CO2 BLDA-SCNC: 23 MMOL/L (ref 20–33)
CO2 BLDA-SCNC: 23 MMOL/L (ref 20–33)
CO2 BLDA-SCNC: 24 MMOL/L (ref 20–33)
CO2 BLDA-SCNC: 26 MMOL/L (ref 20–33)
CO2 BLDA-SCNC: 26 MMOL/L (ref 20–33)
CO2 BLDV-SCNC: 22 MMOL/L (ref 20–33)
CO2 BLDV-SCNC: 23 MMOL/L (ref 20–33)
CO2 BLDV-SCNC: 25 MMOL/L (ref 20–33)
CO2 SERPL-SCNC: 18 MMOL/L (ref 20–33)
CO2 SERPL-SCNC: 19 MMOL/L (ref 20–33)
CO2 SERPL-SCNC: 21 MMOL/L (ref 20–33)
CO2 SERPL-SCNC: 23 MMOL/L (ref 20–33)
CO2 SERPL-SCNC: 24 MMOL/L (ref 20–33)
CO2 SERPL-SCNC: 8 MMOL/L (ref 20–33)
CO2 SERPL-SCNC: 9 MMOL/L (ref 20–33)
COLOR UR: ABNORMAL
COLOR UR: YELLOW
COMPONENT CT 8504CT: NORMAL
COMPONENT F 8504F: NORMAL
COMPONENT F 8504F: NORMAL
COMPONENT P 8504P: NORMAL
COMPONENT R 8504R: NORMAL
COMPONENT R 8504R: NORMAL
CREAT SERPL-MCNC: 1.51 MG/DL (ref 0.5–1.4)
CREAT SERPL-MCNC: 1.54 MG/DL (ref 0.5–1.4)
CREAT SERPL-MCNC: 1.59 MG/DL (ref 0.5–1.4)
CREAT SERPL-MCNC: 1.72 MG/DL (ref 0.5–1.4)
CREAT SERPL-MCNC: 1.73 MG/DL (ref 0.5–1.4)
CREAT SERPL-MCNC: 1.96 MG/DL (ref 0.5–1.4)
CREAT SERPL-MCNC: 2.02 MG/DL (ref 0.5–1.4)
CREAT SERPL-MCNC: 2.04 MG/DL (ref 0.5–1.4)
CREAT SERPL-MCNC: 2.07 MG/DL (ref 0.5–1.4)
CREAT SERPL-MCNC: 2.11 MG/DL (ref 0.5–1.4)
CREAT SERPL-MCNC: 2.12 MG/DL (ref 0.5–1.4)
CREAT SERPL-MCNC: 2.17 MG/DL (ref 0.5–1.4)
CREAT SERPL-MCNC: 2.18 MG/DL (ref 0.5–1.4)
CREAT SERPL-MCNC: 2.21 MG/DL (ref 0.5–1.4)
CREAT SERPL-MCNC: 2.28 MG/DL (ref 0.5–1.4)
CREAT SERPL-MCNC: 2.33 MG/DL (ref 0.5–1.4)
CREAT SERPL-MCNC: 2.84 MG/DL (ref 0.5–1.4)
CT.EXTRINSIC BLD ROTEM: 1.2 MIN (ref 1–3)
CT.EXTRINSIC BLD ROTEM: 3.2 MIN (ref 1–3)
CYTOLOGY REG CYTOL: NORMAL
EKG IMPRESSION: NORMAL
EOSINOPHIL # BLD AUTO: 0.02 K/UL (ref 0–0.51)
EOSINOPHIL # BLD AUTO: 0.15 K/UL (ref 0–0.51)
EOSINOPHIL # BLD AUTO: 0.19 K/UL (ref 0–0.51)
EOSINOPHIL # BLD AUTO: 0.34 K/UL (ref 0–0.51)
EOSINOPHIL # BLD AUTO: 0.37 K/UL (ref 0–0.51)
EOSINOPHIL # BLD AUTO: 0.38 K/UL (ref 0–0.51)
EOSINOPHIL # BLD AUTO: 0.47 K/UL (ref 0–0.51)
EOSINOPHIL # BLD AUTO: 0.49 K/UL (ref 0–0.51)
EOSINOPHIL NFR BLD: 0.2 % (ref 0–6.9)
EOSINOPHIL NFR BLD: 1.2 % (ref 0–6.9)
EOSINOPHIL NFR BLD: 10.3 % (ref 0–6.9)
EOSINOPHIL NFR BLD: 2 % (ref 0–6.9)
EOSINOPHIL NFR BLD: 5.2 % (ref 0–6.9)
EOSINOPHIL NFR BLD: 6.7 % (ref 0–6.9)
EOSINOPHIL NFR BLD: 7.7 % (ref 0–6.9)
EOSINOPHIL NFR BLD: 9.4 % (ref 0–6.9)
EPI CELLS #/AREA URNS HPF: NEGATIVE /HPF
EPI CELLS #/AREA URNS HPF: NORMAL /HPF
ERYTHROCYTE [DISTWIDTH] IN BLOOD BY AUTOMATED COUNT: 54.2 FL (ref 35.9–50)
ERYTHROCYTE [DISTWIDTH] IN BLOOD BY AUTOMATED COUNT: 55.5 FL (ref 35.9–50)
ERYTHROCYTE [DISTWIDTH] IN BLOOD BY AUTOMATED COUNT: 55.6 FL (ref 35.9–50)
ERYTHROCYTE [DISTWIDTH] IN BLOOD BY AUTOMATED COUNT: 55.8 FL (ref 35.9–50)
ERYTHROCYTE [DISTWIDTH] IN BLOOD BY AUTOMATED COUNT: 56.2 FL (ref 35.9–50)
ERYTHROCYTE [DISTWIDTH] IN BLOOD BY AUTOMATED COUNT: 56.7 FL (ref 35.9–50)
ERYTHROCYTE [DISTWIDTH] IN BLOOD BY AUTOMATED COUNT: 57 FL (ref 35.9–50)
ERYTHROCYTE [DISTWIDTH] IN BLOOD BY AUTOMATED COUNT: 57.9 FL (ref 35.9–50)
ERYTHROCYTE [DISTWIDTH] IN BLOOD BY AUTOMATED COUNT: 57.9 FL (ref 35.9–50)
ERYTHROCYTE [DISTWIDTH] IN BLOOD BY AUTOMATED COUNT: 58 FL (ref 35.9–50)
ERYTHROCYTE [DISTWIDTH] IN BLOOD BY AUTOMATED COUNT: 58.8 FL (ref 35.9–50)
ERYTHROCYTE [DISTWIDTH] IN BLOOD BY AUTOMATED COUNT: 61.8 FL (ref 35.9–50)
ERYTHROCYTE [DISTWIDTH] IN BLOOD BY AUTOMATED COUNT: 69.2 FL (ref 35.9–50)
EST. AVERAGE GLUCOSE BLD GHB EST-MCNC: 128 MG/DL
FLUAV RNA SPEC QL NAA+PROBE: NEGATIVE
FLUBV RNA SPEC QL NAA+PROBE: NEGATIVE
FUNGUS SPEC FUNGUS STN: NORMAL
GLOBULIN SER CALC-MCNC: 3.4 G/DL (ref 1.9–3.5)
GLOBULIN SER CALC-MCNC: 4.4 G/DL (ref 1.9–3.5)
GLOBULIN SER CALC-MCNC: 4.8 G/DL (ref 1.9–3.5)
GLOBULIN SER CALC-MCNC: 5.5 G/DL (ref 1.9–3.5)
GLOBULIN SER CALC-MCNC: ABNORMAL G/DL (ref 1.9–3.5)
GLUCOSE BLD-MCNC: 101 MG/DL (ref 65–99)
GLUCOSE BLD-MCNC: 109 MG/DL (ref 65–99)
GLUCOSE BLD-MCNC: 113 MG/DL (ref 65–99)
GLUCOSE BLD-MCNC: 114 MG/DL (ref 65–99)
GLUCOSE BLD-MCNC: 116 MG/DL (ref 65–99)
GLUCOSE BLD-MCNC: 120 MG/DL (ref 65–99)
GLUCOSE BLD-MCNC: 121 MG/DL (ref 65–99)
GLUCOSE BLD-MCNC: 123 MG/DL (ref 65–99)
GLUCOSE BLD-MCNC: 126 MG/DL (ref 65–99)
GLUCOSE BLD-MCNC: 127 MG/DL (ref 65–99)
GLUCOSE BLD-MCNC: 129 MG/DL (ref 65–99)
GLUCOSE BLD-MCNC: 131 MG/DL (ref 65–99)
GLUCOSE BLD-MCNC: 131 MG/DL (ref 65–99)
GLUCOSE BLD-MCNC: 143 MG/DL (ref 65–99)
GLUCOSE BLD-MCNC: 152 MG/DL (ref 65–99)
GLUCOSE BLD-MCNC: 24 MG/DL (ref 65–99)
GLUCOSE BLD-MCNC: 83 MG/DL (ref 65–99)
GLUCOSE BLD-MCNC: 86 MG/DL (ref 65–99)
GLUCOSE BLD-MCNC: 87 MG/DL (ref 65–99)
GLUCOSE BLD-MCNC: 87 MG/DL (ref 65–99)
GLUCOSE BLD-MCNC: 90 MG/DL (ref 65–99)
GLUCOSE BLD-MCNC: 92 MG/DL (ref 65–99)
GLUCOSE BLD-MCNC: 92 MG/DL (ref 65–99)
GLUCOSE BLD-MCNC: 93 MG/DL (ref 65–99)
GLUCOSE BLD-MCNC: 97 MG/DL (ref 65–99)
GLUCOSE BLD-MCNC: 99 MG/DL (ref 65–99)
GLUCOSE BLD-MCNC: 99 MG/DL (ref 65–99)
GLUCOSE SERPL-MCNC: 103 MG/DL (ref 65–99)
GLUCOSE SERPL-MCNC: 105 MG/DL (ref 65–99)
GLUCOSE SERPL-MCNC: 108 MG/DL (ref 65–99)
GLUCOSE SERPL-MCNC: 126 MG/DL (ref 65–99)
GLUCOSE SERPL-MCNC: 131 MG/DL (ref 65–99)
GLUCOSE SERPL-MCNC: 132 MG/DL (ref 65–99)
GLUCOSE SERPL-MCNC: 135 MG/DL (ref 65–99)
GLUCOSE SERPL-MCNC: 136 MG/DL (ref 65–99)
GLUCOSE SERPL-MCNC: 14 MG/DL (ref 65–99)
GLUCOSE SERPL-MCNC: 43 MG/DL (ref 65–99)
GLUCOSE SERPL-MCNC: 81 MG/DL (ref 65–99)
GLUCOSE SERPL-MCNC: 86 MG/DL (ref 65–99)
GLUCOSE SERPL-MCNC: 89 MG/DL (ref 65–99)
GLUCOSE SERPL-MCNC: 94 MG/DL (ref 65–99)
GLUCOSE SERPL-MCNC: 95 MG/DL (ref 65–99)
GLUCOSE SERPL-MCNC: 96 MG/DL (ref 65–99)
GLUCOSE SERPL-MCNC: 98 MG/DL (ref 65–99)
GLUCOSE SERPL-MCNC: 99 MG/DL (ref 65–99)
GLUCOSE UR STRIP.AUTO-MCNC: NEGATIVE MG/DL
GLUCOSE UR STRIP.AUTO-MCNC: NEGATIVE MG/DL
GRAM STN SPEC: NORMAL
GRAM STN SPEC: NORMAL
HAV IGM SERPL QL IA: NORMAL
HBA1C MFR BLD: 6.1 % (ref 0–5.6)
HBV CORE IGM SER QL: NORMAL
HBV SURFACE AB SERPL IA-ACNC: 38.4 MIU/ML (ref 0–10)
HBV SURFACE AG SER QL: NORMAL
HCG SERPL QL: NEGATIVE
HCO3 BLDA-SCNC: 10.5 MMOL/L (ref 17–25)
HCO3 BLDA-SCNC: 13.3 MMOL/L (ref 17–25)
HCO3 BLDA-SCNC: 15.1 MMOL/L (ref 17–25)
HCO3 BLDA-SCNC: 17.6 MMOL/L (ref 17–25)
HCO3 BLDA-SCNC: 17.6 MMOL/L (ref 17–25)
HCO3 BLDA-SCNC: 18 MMOL/L (ref 17–25)
HCO3 BLDA-SCNC: 18.2 MMOL/L (ref 17–25)
HCO3 BLDA-SCNC: 19 MMOL/L (ref 17–25)
HCO3 BLDA-SCNC: 19.8 MMOL/L (ref 17–25)
HCO3 BLDA-SCNC: 19.9 MMOL/L (ref 17–25)
HCO3 BLDA-SCNC: 20.3 MMOL/L (ref 17–25)
HCO3 BLDA-SCNC: 20.8 MMOL/L (ref 17–25)
HCO3 BLDA-SCNC: 21.9 MMOL/L (ref 17–25)
HCO3 BLDA-SCNC: 22.8 MMOL/L (ref 17–25)
HCO3 BLDA-SCNC: 22.8 MMOL/L (ref 17–25)
HCO3 BLDA-SCNC: 23.2 MMOL/L (ref 17–25)
HCO3 BLDA-SCNC: 24.2 MMOL/L (ref 17–25)
HCO3 BLDA-SCNC: 24.7 MMOL/L (ref 17–25)
HCO3 BLDV-SCNC: 20.5 MMOL/L (ref 24–28)
HCO3 BLDV-SCNC: 21.6 MMOL/L (ref 24–28)
HCO3 BLDV-SCNC: 23.6 MMOL/L (ref 24–28)
HCT VFR BLD AUTO: 22.4 % (ref 37–47)
HCT VFR BLD AUTO: 23 % (ref 37–47)
HCT VFR BLD AUTO: 25.8 % (ref 37–47)
HCT VFR BLD AUTO: 26.8 % (ref 37–47)
HCT VFR BLD AUTO: 26.9 % (ref 37–47)
HCT VFR BLD AUTO: 27.3 % (ref 37–47)
HCT VFR BLD AUTO: 28.3 % (ref 37–47)
HCT VFR BLD AUTO: 28.5 % (ref 37–47)
HCT VFR BLD AUTO: 28.5 % (ref 37–47)
HCT VFR BLD AUTO: 28.6 % (ref 37–47)
HCT VFR BLD AUTO: 28.7 % (ref 37–47)
HCT VFR BLD AUTO: 29.2 % (ref 37–47)
HCT VFR BLD AUTO: 31.8 % (ref 37–47)
HCT VFR BLD AUTO: 34.2 % (ref 37–47)
HCT VFR BLD CALC: 16 % (ref 37–47)
HCT VFR BLD CALC: 17 % (ref 37–47)
HCT VFR BLD CALC: 21 % (ref 37–47)
HCT VFR BLD CALC: 23 % (ref 37–47)
HCT VFR BLD CALC: 23 % (ref 37–47)
HCT VFR BLD CALC: 25 % (ref 37–47)
HCT VFR BLD CALC: 25 % (ref 37–47)
HCT VFR BLD CALC: 27 % (ref 37–47)
HCT VFR BLD CALC: 28 % (ref 37–47)
HCT VFR BLD CALC: 30 % (ref 37–47)
HCV AB SER QL: NORMAL
HGB BLD-MCNC: 10.2 G/DL (ref 12–16)
HGB BLD-MCNC: 10.4 G/DL (ref 12–16)
HGB BLD-MCNC: 10.5 G/DL (ref 12–16)
HGB BLD-MCNC: 5.4 G/DL (ref 12–16)
HGB BLD-MCNC: 5.8 G/DL (ref 12–16)
HGB BLD-MCNC: 6.6 G/DL (ref 12–16)
HGB BLD-MCNC: 7.1 G/DL (ref 12–16)
HGB BLD-MCNC: 7.1 G/DL (ref 12–16)
HGB BLD-MCNC: 7.8 G/DL (ref 12–16)
HGB BLD-MCNC: 7.8 G/DL (ref 12–16)
HGB BLD-MCNC: 8.4 G/DL (ref 12–16)
HGB BLD-MCNC: 8.5 G/DL (ref 12–16)
HGB BLD-MCNC: 8.5 G/DL (ref 12–16)
HGB BLD-MCNC: 8.6 G/DL (ref 12–16)
HGB BLD-MCNC: 8.7 G/DL (ref 12–16)
HGB BLD-MCNC: 8.8 G/DL (ref 12–16)
HGB BLD-MCNC: 9.1 G/DL (ref 12–16)
HGB BLD-MCNC: 9.2 G/DL (ref 12–16)
HGB BLD-MCNC: 9.3 G/DL (ref 12–16)
HGB BLD-MCNC: 9.4 G/DL (ref 12–16)
HGB BLD-MCNC: 9.5 G/DL (ref 12–16)
HOROWITZ INDEX BLDA+IHG-RTO: 112 MM[HG]
HOROWITZ INDEX BLDA+IHG-RTO: 220 MM[HG]
HOROWITZ INDEX BLDA+IHG-RTO: 230 MM[HG]
HOROWITZ INDEX BLDA+IHG-RTO: 251 MM[HG]
HOROWITZ INDEX BLDA+IHG-RTO: 265 MM[HG]
HOROWITZ INDEX BLDA+IHG-RTO: 307 MM[HG]
HOROWITZ INDEX BLDA+IHG-RTO: 335 MM[HG]
HOROWITZ INDEX BLDA+IHG-RTO: 343 MM[HG]
HOROWITZ INDEX BLDA+IHG-RTO: 368 MM[HG]
HOROWITZ INDEX BLDA+IHG-RTO: 48 MM[HG]
HOROWITZ INDEX BLDA+IHG-RTO: 49 MM[HG]
HOROWITZ INDEX BLDA+IHG-RTO: 50 MM[HG]
HOROWITZ INDEX BLDA+IHG-RTO: 52 MM[HG]
HYALINE CASTS #/AREA URNS LPF: NORMAL /LPF
IMM GRANULOCYTES # BLD AUTO: 0.02 K/UL (ref 0–0.11)
IMM GRANULOCYTES # BLD AUTO: 0.02 K/UL (ref 0–0.11)
IMM GRANULOCYTES # BLD AUTO: 0.03 K/UL (ref 0–0.11)
IMM GRANULOCYTES # BLD AUTO: 0.05 K/UL (ref 0–0.11)
IMM GRANULOCYTES # BLD AUTO: 0.06 K/UL (ref 0–0.11)
IMM GRANULOCYTES # BLD AUTO: 0.08 K/UL (ref 0–0.11)
IMM GRANULOCYTES NFR BLD AUTO: 0.4 % (ref 0–0.9)
IMM GRANULOCYTES NFR BLD AUTO: 0.4 % (ref 0–0.9)
IMM GRANULOCYTES NFR BLD AUTO: 0.5 % (ref 0–0.9)
IMM GRANULOCYTES NFR BLD AUTO: 0.6 % (ref 0–0.9)
IMM GRANULOCYTES NFR BLD AUTO: 0.7 % (ref 0–0.9)
INR PPP: 1.43 (ref 0.87–1.13)
INR PPP: 1.6 (ref 0.87–1.13)
INR PPP: 1.62 (ref 0.87–1.13)
INR PPP: 1.63 (ref 0.87–1.13)
INR PPP: 1.64 (ref 0.87–1.13)
INR PPP: 1.66 (ref 0.87–1.13)
INR PPP: 1.77 (ref 0.87–1.13)
INR PPP: 1.77 (ref 0.87–1.13)
INR PPP: 1.79 (ref 0.87–1.13)
INR PPP: 2.23 (ref 0.87–1.13)
INR PPP: 2.47 (ref 0.87–1.13)
INR PPP: 2.53 (ref 0.87–1.13)
INR PPP: 2.57 (ref 0.87–1.13)
INR PPP: 3.91 (ref 0.87–1.13)
INST. QUALIFIED PATIENT IIQPT: YES
KETONES UR STRIP.AUTO-MCNC: ABNORMAL MG/DL
KETONES UR STRIP.AUTO-MCNC: NEGATIVE MG/DL
LACTATE BLD-SCNC: 12.3 MMOL/L (ref 0.5–2)
LACTATE BLD-SCNC: 2.8 MMOL/L (ref 0.5–2)
LACTATE BLD-SCNC: 3.9 MMOL/L (ref 0.5–2)
LACTATE BLD-SCNC: 8.8 MMOL/L (ref 0.5–2)
LEUKOCYTE ESTERASE UR QL STRIP.AUTO: ABNORMAL
LEUKOCYTE ESTERASE UR QL STRIP.AUTO: NEGATIVE
LIPASE SERPL-CCNC: 40 U/L (ref 11–82)
LV EJECT FRACT  99904: 55
LV EJECT FRACT MOD 4C 99902: 25.97
LYMPHOCYTES # BLD AUTO: 0.92 K/UL (ref 1–4.8)
LYMPHOCYTES # BLD AUTO: 0.96 K/UL (ref 1–4.8)
LYMPHOCYTES # BLD AUTO: 0.99 K/UL (ref 1–4.8)
LYMPHOCYTES # BLD AUTO: 1.18 K/UL (ref 1–4.8)
LYMPHOCYTES # BLD AUTO: 1.19 K/UL (ref 1–4.8)
LYMPHOCYTES # BLD AUTO: 1.23 K/UL (ref 1–4.8)
LYMPHOCYTES # BLD AUTO: 1.34 K/UL (ref 1–4.8)
LYMPHOCYTES # BLD AUTO: 1.35 K/UL (ref 1–4.8)
LYMPHOCYTES NFR BLD: 11 % (ref 22–41)
LYMPHOCYTES NFR BLD: 11.9 % (ref 22–41)
LYMPHOCYTES NFR BLD: 12.3 % (ref 22–41)
LYMPHOCYTES NFR BLD: 14.2 % (ref 22–41)
LYMPHOCYTES NFR BLD: 20.2 % (ref 22–41)
LYMPHOCYTES NFR BLD: 20.7 % (ref 22–41)
LYMPHOCYTES NFR BLD: 21.1 % (ref 22–41)
LYMPHOCYTES NFR BLD: 24.6 % (ref 22–41)
MAGNESIUM SERPL-MCNC: 1.6 MG/DL (ref 1.5–2.5)
MAGNESIUM SERPL-MCNC: 1.7 MG/DL (ref 1.5–2.5)
MAGNESIUM SERPL-MCNC: 1.7 MG/DL (ref 1.5–2.5)
MAGNESIUM SERPL-MCNC: 1.8 MG/DL (ref 1.5–2.5)
MAGNESIUM SERPL-MCNC: 1.8 MG/DL (ref 1.5–2.5)
MAGNESIUM SERPL-MCNC: 1.9 MG/DL (ref 1.5–2.5)
MAGNESIUM SERPL-MCNC: 2 MG/DL (ref 1.5–2.5)
MAGNESIUM SERPL-MCNC: 2.2 MG/DL (ref 1.5–2.5)
MAGNESIUM SERPL-MCNC: 2.3 MG/DL (ref 1.5–2.5)
MAGNESIUM SERPL-MCNC: 2.4 MG/DL (ref 1.5–2.5)
MAGNESIUM SERPL-MCNC: 2.9 MG/DL (ref 1.5–2.5)
MAGNESIUM SERPL-MCNC: 3 MG/DL (ref 1.5–2.5)
MCF BLD TEG: 56.8 MM (ref 50–70)
MCF BLD TEG: 66.1 MM (ref 50–70)
MCF P HPASE BLD TEG: 64.8 MM (ref 50–70)
MCH RBC QN AUTO: 27 PG (ref 27–33)
MCH RBC QN AUTO: 27 PG (ref 27–33)
MCH RBC QN AUTO: 27.1 PG (ref 27–33)
MCH RBC QN AUTO: 27.1 PG (ref 27–33)
MCH RBC QN AUTO: 27.2 PG (ref 27–33)
MCH RBC QN AUTO: 27.2 PG (ref 27–33)
MCH RBC QN AUTO: 27.3 PG (ref 27–33)
MCH RBC QN AUTO: 27.4 PG (ref 27–33)
MCH RBC QN AUTO: 27.4 PG (ref 27–33)
MCH RBC QN AUTO: 27.5 PG (ref 27–33)
MCH RBC QN AUTO: 27.6 PG (ref 27–33)
MCH RBC QN AUTO: 27.9 PG (ref 27–33)
MCH RBC QN AUTO: 28.3 PG (ref 27–33)
MCHC RBC AUTO-ENTMCNC: 29.1 G/DL (ref 33.6–35)
MCHC RBC AUTO-ENTMCNC: 30.7 G/DL (ref 33.6–35)
MCHC RBC AUTO-ENTMCNC: 31.7 G/DL (ref 33.6–35)
MCHC RBC AUTO-ENTMCNC: 31.8 G/DL (ref 33.6–35)
MCHC RBC AUTO-ENTMCNC: 31.9 G/DL (ref 33.6–35)
MCHC RBC AUTO-ENTMCNC: 31.9 G/DL (ref 33.6–35)
MCHC RBC AUTO-ENTMCNC: 32 G/DL (ref 33.6–35)
MCHC RBC AUTO-ENTMCNC: 32.2 G/DL (ref 33.6–35)
MCHC RBC AUTO-ENTMCNC: 32.4 G/DL (ref 33.6–35)
MCHC RBC AUTO-ENTMCNC: 32.5 G/DL (ref 33.6–35)
MCHC RBC AUTO-ENTMCNC: 32.7 G/DL (ref 33.6–35)
MCV RBC AUTO: 83.9 FL (ref 81.4–97.8)
MCV RBC AUTO: 84.3 FL (ref 81.4–97.8)
MCV RBC AUTO: 84.4 FL (ref 81.4–97.8)
MCV RBC AUTO: 84.7 FL (ref 81.4–97.8)
MCV RBC AUTO: 84.7 FL (ref 81.4–97.8)
MCV RBC AUTO: 84.8 FL (ref 81.4–97.8)
MCV RBC AUTO: 84.8 FL (ref 81.4–97.8)
MCV RBC AUTO: 85.1 FL (ref 81.4–97.8)
MCV RBC AUTO: 85.2 FL (ref 81.4–97.8)
MCV RBC AUTO: 85.6 FL (ref 81.4–97.8)
MCV RBC AUTO: 85.7 FL (ref 81.4–97.8)
MCV RBC AUTO: 90.7 FL (ref 81.4–97.8)
MCV RBC AUTO: 97 FL (ref 81.4–97.8)
MICRO URNS: ABNORMAL
MICRO URNS: ABNORMAL
MONOCYTES # BLD AUTO: 0.52 K/UL (ref 0–0.85)
MONOCYTES # BLD AUTO: 0.54 K/UL (ref 0–0.85)
MONOCYTES # BLD AUTO: 0.57 K/UL (ref 0–0.85)
MONOCYTES # BLD AUTO: 0.6 K/UL (ref 0–0.85)
MONOCYTES # BLD AUTO: 0.6 K/UL (ref 0–0.85)
MONOCYTES # BLD AUTO: 0.75 K/UL (ref 0–0.85)
MONOCYTES # BLD AUTO: 0.78 K/UL (ref 0–0.85)
MONOCYTES # BLD AUTO: 1.17 K/UL (ref 0–0.85)
MONOCYTES NFR BLD AUTO: 10.6 % (ref 0–13.4)
MONOCYTES NFR BLD AUTO: 11.3 % (ref 0–13.4)
MONOCYTES NFR BLD AUTO: 11.5 % (ref 0–13.4)
MONOCYTES NFR BLD AUTO: 12 % (ref 0–13.4)
MONOCYTES NFR BLD AUTO: 12 % (ref 0–13.4)
MONOCYTES NFR BLD AUTO: 4.6 % (ref 0–13.4)
MONOCYTES NFR BLD AUTO: 8.1 % (ref 0–13.4)
MONOCYTES NFR BLD AUTO: 9.6 % (ref 0–13.4)
NEUTROPHILS # BLD AUTO: 2.62 K/UL (ref 2–7.15)
NEUTROPHILS # BLD AUTO: 2.65 K/UL (ref 2–7.15)
NEUTROPHILS # BLD AUTO: 2.81 K/UL (ref 2–7.15)
NEUTROPHILS # BLD AUTO: 3.39 K/UL (ref 2–7.15)
NEUTROPHILS # BLD AUTO: 4.41 K/UL (ref 2–7.15)
NEUTROPHILS # BLD AUTO: 7.38 K/UL (ref 2–7.15)
NEUTROPHILS # BLD AUTO: 9.24 K/UL (ref 2–7.15)
NEUTROPHILS # BLD AUTO: 9.42 K/UL (ref 2–7.15)
NEUTROPHILS NFR BLD: 52.4 % (ref 44–72)
NEUTROPHILS NFR BLD: 55.8 % (ref 44–72)
NEUTROPHILS NFR BLD: 58.9 % (ref 44–72)
NEUTROPHILS NFR BLD: 60 % (ref 44–72)
NEUTROPHILS NFR BLD: 67.8 % (ref 44–72)
NEUTROPHILS NFR BLD: 76.6 % (ref 44–72)
NEUTROPHILS NFR BLD: 77 % (ref 44–72)
NEUTROPHILS NFR BLD: 82.2 % (ref 44–72)
NITRITE UR QL STRIP.AUTO: NEGATIVE
NITRITE UR QL STRIP.AUTO: NEGATIVE
NRBC # BLD AUTO: 0 K/UL
NRBC # BLD AUTO: 0.03 K/UL
NRBC # BLD AUTO: 0.05 K/UL
NRBC BLD-RTO: 0 /100 WBC
NRBC BLD-RTO: 0.6 /100 WBC
NRBC BLD-RTO: 1.1 /100 WBC
NT-PROBNP SERPL IA-MCNC: ABNORMAL PG/ML (ref 0–125)
NT-PROBNP SERPL IA-MCNC: ABNORMAL PG/ML (ref 0–125)
O2/TOTAL GAS SETTING VFR VENT: 100 %
O2/TOTAL GAS SETTING VFR VENT: 30 %
O2/TOTAL GAS SETTING VFR VENT: 30 %
O2/TOTAL GAS SETTING VFR VENT: 40 %
PA AA BLD-ACNC: 41.5 %
PA AA BLD-ACNC: 56.2 %
PA ADP BLD-ACNC: 40.4 %
PA ADP BLD-ACNC: 51.2 %
PATHOLOGY CONSULT NOTE: NORMAL
PCO2 BLDA: 32 MMHG (ref 26–37)
PCO2 BLDA: 33.5 MMHG (ref 26–37)
PCO2 BLDA: 33.5 MMHG (ref 26–37)
PCO2 BLDA: 33.8 MMHG (ref 26–37)
PCO2 BLDA: 34.5 MMHG (ref 26–37)
PCO2 BLDA: 35.7 MMHG (ref 26–37)
PCO2 BLDA: 35.8 MMHG (ref 26–37)
PCO2 BLDA: 38.7 MMHG (ref 26–37)
PCO2 BLDA: 39.5 MMHG (ref 26–37)
PCO2 BLDA: 39.8 MMHG (ref 26–37)
PCO2 BLDA: 39.8 MMHG (ref 26–37)
PCO2 BLDA: 40.1 MMHG (ref 26–37)
PCO2 BLDA: 42.8 MMHG (ref 26–37)
PCO2 BLDA: 45.7 MMHG (ref 26–37)
PCO2 BLDA: 54.6 MMHG (ref 26–37)
PCO2 BLDA: 55.5 MMHG (ref 26–37)
PCO2 BLDA: 59.7 MMHG (ref 26–37)
PCO2 BLDA: 61.4 MMHG (ref 26–37)
PCO2 BLDV: 41.5 MMHG (ref 41–51)
PCO2 BLDV: 42.6 MMHG (ref 41–51)
PCO2 BLDV: 44.7 MMHG (ref 41–51)
PCO2 TEMP ADJ BLDA: 30.6 MMHG (ref 26–37)
PCO2 TEMP ADJ BLDA: 30.7 MMHG (ref 26–37)
PCO2 TEMP ADJ BLDA: 32.6 MMHG (ref 26–37)
PCO2 TEMP ADJ BLDA: 32.8 MMHG (ref 26–37)
PCO2 TEMP ADJ BLDA: 33.7 MMHG (ref 26–37)
PCO2 TEMP ADJ BLDA: 37.8 MMHG (ref 26–37)
PCO2 TEMP ADJ BLDA: 38 MMHG (ref 26–37)
PCO2 TEMP ADJ BLDA: 39.1 MMHG (ref 26–37)
PCO2 TEMP ADJ BLDA: 39.4 MMHG (ref 26–37)
PCO2 TEMP ADJ BLDA: 56.7 MMHG (ref 26–37)
PCO2 TEMP ADJ BLDA: 59.1 MMHG (ref 26–37)
PCO2 TEMP ADJ BLDA: 64.6 MMHG (ref 26–37)
PCO2 TEMP ADJ BLDA: 65.3 MMHG (ref 26–37)
PH BLDA: 6.88 [PH] (ref 7.4–7.5)
PH BLDA: 6.95 [PH] (ref 7.4–7.5)
PH BLDA: 7.15 [PH] (ref 7.4–7.5)
PH BLDA: 7.17 [PH] (ref 7.4–7.5)
PH BLDA: 7.19 [PH] (ref 7.4–7.5)
PH BLDA: 7.28 [PH] (ref 7.4–7.5)
PH BLDA: 7.29 [PH] (ref 7.4–7.5)
PH BLDA: 7.3 [PH] (ref 7.4–7.5)
PH BLDA: 7.32 [PH] (ref 7.4–7.5)
PH BLDA: 7.33 [PH] (ref 7.4–7.5)
PH BLDA: 7.34 [PH] (ref 7.4–7.5)
PH BLDA: 7.35 [PH] (ref 7.4–7.5)
PH BLDA: 7.4 [PH] (ref 7.4–7.5)
PH BLDA: 7.42 [PH] (ref 7.4–7.5)
PH BLDA: 7.43 [PH] (ref 7.4–7.5)
PH BLDA: 7.47 [PH] (ref 7.4–7.5)
PH BLDV: 7.3 [PH] (ref 7.31–7.45)
PH BLDV: 7.31 [PH] (ref 7.31–7.45)
PH BLDV: 7.33 [PH] (ref 7.31–7.45)
PH TEMP ADJ BLDA: 6.88 [PH] (ref 7.4–7.5)
PH TEMP ADJ BLDA: 6.93 [PH] (ref 7.4–7.5)
PH TEMP ADJ BLDA: 7.13 [PH] (ref 7.4–7.5)
PH TEMP ADJ BLDA: 7.15 [PH] (ref 7.4–7.5)
PH TEMP ADJ BLDA: 7.19 [PH] (ref 7.4–7.5)
PH TEMP ADJ BLDA: 7.28 [PH] (ref 7.4–7.5)
PH TEMP ADJ BLDA: 7.29 [PH] (ref 7.4–7.5)
PH TEMP ADJ BLDA: 7.33 [PH] (ref 7.4–7.5)
PH TEMP ADJ BLDA: 7.33 [PH] (ref 7.4–7.5)
PH TEMP ADJ BLDA: 7.34 [PH] (ref 7.4–7.5)
PH TEMP ADJ BLDA: 7.37 [PH] (ref 7.4–7.5)
PH TEMP ADJ BLDA: 7.44 [PH] (ref 7.4–7.5)
PH TEMP ADJ BLDA: 7.48 [PH] (ref 7.4–7.5)
PH UR STRIP.AUTO: 5 [PH] (ref 5–8)
PH UR STRIP.AUTO: 5 [PH] (ref 5–8)
PHOSPHATE SERPL-MCNC: 5.2 MG/DL (ref 2.5–4.5)
PLATELET # BLD AUTO: 120 K/UL (ref 164–446)
PLATELET # BLD AUTO: 204 K/UL (ref 164–446)
PLATELET # BLD AUTO: 206 K/UL (ref 164–446)
PLATELET # BLD AUTO: 209 K/UL (ref 164–446)
PLATELET # BLD AUTO: 209 K/UL (ref 164–446)
PLATELET # BLD AUTO: 211 K/UL (ref 164–446)
PLATELET # BLD AUTO: 219 K/UL (ref 164–446)
PLATELET # BLD AUTO: 23 K/UL (ref 164–446)
PLATELET # BLD AUTO: 234 K/UL (ref 164–446)
PLATELET # BLD AUTO: 239 K/UL (ref 164–446)
PLATELET # BLD AUTO: 266 K/UL (ref 164–446)
PLATELET # BLD AUTO: 33 K/UL (ref 164–446)
PLATELET # BLD AUTO: 74 K/UL (ref 164–446)
PLATELET # BLD AUTO: 81 K/UL (ref 164–446)
PMV BLD AUTO: 10.3 FL (ref 9–12.9)
PMV BLD AUTO: 10.5 FL (ref 9–12.9)
PMV BLD AUTO: 10.7 FL (ref 9–12.9)
PMV BLD AUTO: 10.8 FL (ref 9–12.9)
PMV BLD AUTO: 11 FL (ref 9–12.9)
PMV BLD AUTO: 11.3 FL (ref 9–12.9)
PMV BLD AUTO: 9.1 FL (ref 9–12.9)
PO2 BLDA: 103 MMHG (ref 64–87)
PO2 BLDA: 106 MMHG (ref 64–87)
PO2 BLDA: 112 MMHG (ref 64–87)
PO2 BLDA: 251 MMHG (ref 64–87)
PO2 BLDA: 296 MMHG (ref 64–87)
PO2 BLDA: 300 MMHG (ref 64–87)
PO2 BLDA: 303 MMHG (ref 64–87)
PO2 BLDA: 316 MMHG (ref 64–87)
PO2 BLDA: 335 MMHG (ref 64–87)
PO2 BLDA: 368 MMHG (ref 64–87)
PO2 BLDA: 414 MMHG (ref 64–87)
PO2 BLDA: 48 MMHG (ref 64–87)
PO2 BLDA: 49 MMHG (ref 64–87)
PO2 BLDA: 50 MMHG (ref 64–87)
PO2 BLDA: 52 MMHG (ref 64–87)
PO2 BLDA: 88 MMHG (ref 64–87)
PO2 BLDA: 92 MMHG (ref 64–87)
PO2 BLDA: 92 MMHG (ref 64–87)
PO2 BLDV: 35 MMHG (ref 25–40)
PO2 BLDV: 40 MMHG (ref 25–40)
PO2 BLDV: 57 MMHG (ref 25–40)
PO2 TEMP ADJ BLDA: 102 MMHG (ref 64–87)
PO2 TEMP ADJ BLDA: 110 MMHG (ref 64–87)
PO2 TEMP ADJ BLDA: 242 MMHG (ref 64–87)
PO2 TEMP ADJ BLDA: 325 MMHG (ref 64–87)
PO2 TEMP ADJ BLDA: 363 MMHG (ref 64–87)
PO2 TEMP ADJ BLDA: 49 MMHG (ref 64–87)
PO2 TEMP ADJ BLDA: 55 MMHG (ref 64–87)
PO2 TEMP ADJ BLDA: 55 MMHG (ref 64–87)
PO2 TEMP ADJ BLDA: 59 MMHG (ref 64–87)
PO2 TEMP ADJ BLDA: 85 MMHG (ref 64–87)
PO2 TEMP ADJ BLDA: 88 MMHG (ref 64–87)
PO2 TEMP ADJ BLDA: 90 MMHG (ref 64–87)
PO2 TEMP ADJ BLDA: 97 MMHG (ref 64–87)
POTASSIUM BLD-SCNC: 3.8 MMOL/L (ref 3.6–5.5)
POTASSIUM BLD-SCNC: 4.1 MMOL/L (ref 3.6–5.5)
POTASSIUM BLD-SCNC: 4.2 MMOL/L (ref 3.6–5.5)
POTASSIUM BLD-SCNC: 4.3 MMOL/L (ref 3.6–5.5)
POTASSIUM BLD-SCNC: 4.7 MMOL/L (ref 3.6–5.5)
POTASSIUM BLD-SCNC: 4.8 MMOL/L (ref 3.6–5.5)
POTASSIUM BLD-SCNC: 5 MMOL/L (ref 3.6–5.5)
POTASSIUM BLD-SCNC: 5.1 MMOL/L (ref 3.6–5.5)
POTASSIUM BLD-SCNC: 5.2 MMOL/L (ref 3.6–5.5)
POTASSIUM BLD-SCNC: 5.5 MMOL/L (ref 3.6–5.5)
POTASSIUM SERPL-SCNC: 3.3 MMOL/L (ref 3.6–5.5)
POTASSIUM SERPL-SCNC: 3.5 MMOL/L (ref 3.6–5.5)
POTASSIUM SERPL-SCNC: 3.5 MMOL/L (ref 3.6–5.5)
POTASSIUM SERPL-SCNC: 3.6 MMOL/L (ref 3.6–5.5)
POTASSIUM SERPL-SCNC: 3.7 MMOL/L (ref 3.6–5.5)
POTASSIUM SERPL-SCNC: 3.8 MMOL/L (ref 3.6–5.5)
POTASSIUM SERPL-SCNC: 3.9 MMOL/L (ref 3.6–5.5)
POTASSIUM SERPL-SCNC: 3.9 MMOL/L (ref 3.6–5.5)
POTASSIUM SERPL-SCNC: 4 MMOL/L (ref 3.6–5.5)
POTASSIUM SERPL-SCNC: 4.1 MMOL/L (ref 3.6–5.5)
POTASSIUM SERPL-SCNC: 4.2 MMOL/L (ref 3.6–5.5)
POTASSIUM SERPL-SCNC: 4.3 MMOL/L (ref 3.6–5.5)
POTASSIUM SERPL-SCNC: 4.4 MMOL/L (ref 3.6–5.5)
POTASSIUM SERPL-SCNC: 4.5 MMOL/L (ref 3.6–5.5)
POTASSIUM SERPL-SCNC: 4.8 MMOL/L (ref 3.6–5.5)
POTASSIUM SERPL-SCNC: 5 MMOL/L (ref 3.6–5.5)
POTASSIUM SERPL-SCNC: 5 MMOL/L (ref 3.6–5.5)
POTASSIUM SERPL-SCNC: 5.3 MMOL/L (ref 3.6–5.5)
POTASSIUM SERPL-SCNC: 5.3 MMOL/L (ref 3.6–5.5)
POTASSIUM SERPL-SCNC: 5.6 MMOL/L (ref 3.6–5.5)
POTASSIUM SERPL-SCNC: 5.8 MMOL/L (ref 3.6–5.5)
PROCALCITONIN SERPL-MCNC: 3.18 NG/ML
PRODUCT TYPE UPROD: NORMAL
PROT SERPL-MCNC: 4.2 G/DL (ref 6–8.2)
PROT SERPL-MCNC: 6.1 G/DL (ref 6–8.2)
PROT SERPL-MCNC: 7 G/DL (ref 6–8.2)
PROT SERPL-MCNC: 7.4 G/DL (ref 6–8.2)
PROT SERPL-MCNC: 8.3 G/DL (ref 6–8.2)
PROT UR QL STRIP: 100 MG/DL
PROT UR QL STRIP: 30 MG/DL
PROTHROMBIN TIME: 17.9 SEC (ref 12–14.6)
PROTHROMBIN TIME: 19.5 SEC (ref 12–14.6)
PROTHROMBIN TIME: 19.7 SEC (ref 12–14.6)
PROTHROMBIN TIME: 19.8 SEC (ref 12–14.6)
PROTHROMBIN TIME: 19.9 SEC (ref 12–14.6)
PROTHROMBIN TIME: 20.1 SEC (ref 12–14.6)
PROTHROMBIN TIME: 21.1 SEC (ref 12–14.6)
PROTHROMBIN TIME: 21.2 SEC (ref 12–14.6)
PROTHROMBIN TIME: 21.4 SEC (ref 12–14.6)
PROTHROMBIN TIME: 25.4 SEC (ref 12–14.6)
PROTHROMBIN TIME: 27.5 SEC (ref 12–14.6)
PROTHROMBIN TIME: 28 SEC (ref 12–14.6)
PROTHROMBIN TIME: 28.4 SEC (ref 12–14.6)
PROTHROMBIN TIME: 39.5 SEC (ref 12–14.6)
RBC # BLD AUTO: 2.37 M/UL (ref 4.2–5.4)
RBC # BLD AUTO: 2.63 M/UL (ref 4.2–5.4)
RBC # BLD AUTO: 3.14 M/UL (ref 4.2–5.4)
RBC # BLD AUTO: 3.16 M/UL (ref 4.2–5.4)
RBC # BLD AUTO: 3.24 M/UL (ref 4.2–5.4)
RBC # BLD AUTO: 3.33 M/UL (ref 4.2–5.4)
RBC # BLD AUTO: 3.34 M/UL (ref 4.2–5.4)
RBC # BLD AUTO: 3.36 M/UL (ref 4.2–5.4)
RBC # BLD AUTO: 3.36 M/UL (ref 4.2–5.4)
RBC # BLD AUTO: 3.39 M/UL (ref 4.2–5.4)
RBC # BLD AUTO: 3.48 M/UL (ref 4.2–5.4)
RBC # BLD AUTO: 3.77 M/UL (ref 4.2–5.4)
RBC # BLD AUTO: 3.77 M/UL (ref 4.2–5.4)
RBC # URNS HPF: NORMAL /HPF
RBC # URNS HPF: NORMAL /HPF
RBC UR QL AUTO: NEGATIVE
RBC UR QL AUTO: NEGATIVE
RH BLD: NORMAL
RSV RNA SPEC QL NAA+PROBE: NEGATIVE
SAO2 % BLDA: 100 % (ref 93–99)
SAO2 % BLDA: 53 % (ref 93–99)
SAO2 % BLDA: 60 % (ref 93–99)
SAO2 % BLDA: 73 % (ref 93–99)
SAO2 % BLDA: 75 % (ref 93–99)
SAO2 % BLDA: 95 % (ref 93–99)
SAO2 % BLDA: 95 % (ref 93–99)
SAO2 % BLDA: 97 % (ref 93–99)
SAO2 % BLDA: 98 % (ref 93–99)
SAO2 % BLDV: 61 %
SAO2 % BLDV: 70 %
SAO2 % BLDV: 87 %
SARS-COV-2 RNA RESP QL NAA+PROBE: NOTDETECTED
SIGNIFICANT IND 70042: NORMAL
SITE SITE: NORMAL
SODIUM BLD-SCNC: 128 MMOL/L (ref 135–145)
SODIUM BLD-SCNC: 128 MMOL/L (ref 135–145)
SODIUM BLD-SCNC: 129 MMOL/L (ref 135–145)
SODIUM BLD-SCNC: 129 MMOL/L (ref 135–145)
SODIUM BLD-SCNC: 131 MMOL/L (ref 135–145)
SODIUM BLD-SCNC: 132 MMOL/L (ref 135–145)
SODIUM BLD-SCNC: 132 MMOL/L (ref 135–145)
SODIUM BLD-SCNC: 133 MMOL/L (ref 135–145)
SODIUM BLD-SCNC: 134 MMOL/L (ref 135–145)
SODIUM BLD-SCNC: 134 MMOL/L (ref 135–145)
SODIUM SERPL-SCNC: 121 MMOL/L (ref 135–145)
SODIUM SERPL-SCNC: 122 MMOL/L (ref 135–145)
SODIUM SERPL-SCNC: 124 MMOL/L (ref 135–145)
SODIUM SERPL-SCNC: 125 MMOL/L (ref 135–145)
SODIUM SERPL-SCNC: 125 MMOL/L (ref 135–145)
SODIUM SERPL-SCNC: 126 MMOL/L (ref 135–145)
SODIUM SERPL-SCNC: 127 MMOL/L (ref 135–145)
SODIUM SERPL-SCNC: 128 MMOL/L (ref 135–145)
SODIUM SERPL-SCNC: 129 MMOL/L (ref 135–145)
SODIUM SERPL-SCNC: 130 MMOL/L (ref 135–145)
SODIUM SERPL-SCNC: 131 MMOL/L (ref 135–145)
SODIUM SERPL-SCNC: 131 MMOL/L (ref 135–145)
SODIUM SERPL-SCNC: 132 MMOL/L (ref 135–145)
SODIUM SERPL-SCNC: 133 MMOL/L (ref 135–145)
SODIUM SERPL-SCNC: 135 MMOL/L (ref 135–145)
SOURCE SOURCE: NORMAL
SP GR UR STRIP.AUTO: 1.02
SP GR UR STRIP.AUTO: 1.02
SPECIMEN DRAWN FROM PATIENT: ABNORMAL
SPECIMEN SOURCE: NORMAL
T4 FREE SERPL-MCNC: 1.36 NG/DL (ref 0.93–1.7)
TEG ALGORITHM TGALG: ABNORMAL
TEG ALGORITHM TGALG: ABNORMAL
TROPONIN T SERPL-MCNC: 102 NG/L (ref 6–19)
TSH SERPL DL<=0.005 MIU/L-ACNC: 2.36 UIU/ML (ref 0.38–5.33)
UFH PPP CHRO-ACNC: 0.29 IU/ML
UFH PPP CHRO-ACNC: 0.45 IU/ML
UFH PPP CHRO-ACNC: 0.48 IU/ML
UFH PPP CHRO-ACNC: 0.49 IU/ML
UFH PPP CHRO-ACNC: 0.5 IU/ML
UFH PPP CHRO-ACNC: 0.55 IU/ML
UFH PPP CHRO-ACNC: 0.65 IU/ML
UFH PPP CHRO-ACNC: <0.1 IU/ML
UFH PPP CHRO-ACNC: <0.1 IU/ML
UNIT STATUS USTAT: NORMAL
UROBILINOGEN UR STRIP.AUTO-MCNC: 0.2 MG/DL
UROBILINOGEN UR STRIP.AUTO-MCNC: 1 MG/DL
WBC # BLD AUTO: 11.2 K/UL (ref 4.8–10.8)
WBC # BLD AUTO: 12.2 K/UL (ref 4.8–10.8)
WBC # BLD AUTO: 16.6 K/UL (ref 4.8–10.8)
WBC # BLD AUTO: 3.1 K/UL (ref 4.8–10.8)
WBC # BLD AUTO: 4.8 K/UL (ref 4.8–10.8)
WBC # BLD AUTO: 4.8 K/UL (ref 4.8–10.8)
WBC # BLD AUTO: 5 K/UL (ref 4.8–10.8)
WBC # BLD AUTO: 5.3 K/UL (ref 4.8–10.8)
WBC # BLD AUTO: 5.7 K/UL (ref 4.8–10.8)
WBC # BLD AUTO: 6.5 K/UL (ref 4.8–10.8)
WBC # BLD AUTO: 6.6 K/UL (ref 4.8–10.8)
WBC # BLD AUTO: 7.4 K/UL (ref 4.8–10.8)
WBC # BLD AUTO: 9.6 K/UL (ref 4.8–10.8)
WBC #/AREA URNS HPF: NORMAL /HPF
WBC #/AREA URNS HPF: NORMAL /HPF

## 2021-01-01 PROCEDURE — 85025 COMPLETE CBC W/AUTO DIFF WBC: CPT

## 2021-01-01 PROCEDURE — 82962 GLUCOSE BLOOD TEST: CPT

## 2021-01-01 PROCEDURE — 700105 HCHG RX REV CODE 258: Performed by: INTERNAL MEDICINE

## 2021-01-01 PROCEDURE — A9270 NON-COVERED ITEM OR SERVICE: HCPCS | Performed by: PHYSICIAN ASSISTANT

## 2021-01-01 PROCEDURE — 93005 ELECTROCARDIOGRAM TRACING: CPT | Performed by: NURSE PRACTITIONER

## 2021-01-01 PROCEDURE — 93005 ELECTROCARDIOGRAM TRACING: CPT | Performed by: INTERNAL MEDICINE

## 2021-01-01 PROCEDURE — 94799 UNLISTED PULMONARY SVC/PX: CPT

## 2021-01-01 PROCEDURE — 93321 DOPPLER ECHO F-UP/LMTD STD: CPT | Mod: 26 | Performed by: INTERNAL MEDICINE

## 2021-01-01 PROCEDURE — 80048 BASIC METABOLIC PNL TOTAL CA: CPT

## 2021-01-01 PROCEDURE — 99291 CRITICAL CARE FIRST HOUR: CPT | Performed by: INTERNAL MEDICINE

## 2021-01-01 PROCEDURE — 84439 ASSAY OF FREE THYROXINE: CPT

## 2021-01-01 PROCEDURE — 83735 ASSAY OF MAGNESIUM: CPT

## 2021-01-01 PROCEDURE — 110372 HCHG SHELL REV 278: Performed by: THORACIC SURGERY (CARDIOTHORACIC VASCULAR SURGERY)

## 2021-01-01 PROCEDURE — 36620 INSERTION CATHETER ARTERY: CPT | Performed by: INTERNAL MEDICINE

## 2021-01-01 PROCEDURE — 770022 HCHG ROOM/CARE - ICU (200)

## 2021-01-01 PROCEDURE — C1729 CATH, DRAINAGE: HCPCS | Performed by: THORACIC SURGERY (CARDIOTHORACIC VASCULAR SURGERY)

## 2021-01-01 PROCEDURE — 160031 HCHG SURGERY MINUTES - 1ST 30 MINS LEVEL 5: Performed by: THORACIC SURGERY (CARDIOTHORACIC VASCULAR SURGERY)

## 2021-01-01 PROCEDURE — 85610 PROTHROMBIN TIME: CPT | Mod: 91

## 2021-01-01 PROCEDURE — 02580ZZ DESTRUCTION OF CONDUCTION MECHANISM, OPEN APPROACH: ICD-10-PCS | Performed by: THORACIC SURGERY (CARDIOTHORACIC VASCULAR SURGERY)

## 2021-01-01 PROCEDURE — 700111 HCHG RX REV CODE 636 W/ 250 OVERRIDE (IP): Performed by: INTERNAL MEDICINE

## 2021-01-01 PROCEDURE — 87205 SMEAR GRAM STAIN: CPT

## 2021-01-01 PROCEDURE — 700111 HCHG RX REV CODE 636 W/ 250 OVERRIDE (IP): Performed by: STUDENT IN AN ORGANIZED HEALTH CARE EDUCATION/TRAINING PROGRAM

## 2021-01-01 PROCEDURE — 501506 HCHG SUTURE GUIDE, VALVE REPLACEMENT: Performed by: THORACIC SURGERY (CARDIOTHORACIC VASCULAR SURGERY)

## 2021-01-01 PROCEDURE — 36430 TRANSFUSION BLD/BLD COMPNT: CPT

## 2021-01-01 PROCEDURE — 37799 UNLISTED PX VASCULAR SURGERY: CPT

## 2021-01-01 PROCEDURE — 700102 HCHG RX REV CODE 250 W/ 637 OVERRIDE(OP): Performed by: PHYSICIAN ASSISTANT

## 2021-01-01 PROCEDURE — 700111 HCHG RX REV CODE 636 W/ 250 OVERRIDE (IP)

## 2021-01-01 PROCEDURE — P9017 PLASMA 1 DONOR FRZ W/IN 8 HR: HCPCS | Mod: 91

## 2021-01-01 PROCEDURE — 86901 BLOOD TYPING SEROLOGIC RH(D): CPT

## 2021-01-01 PROCEDURE — 71045 X-RAY EXAM CHEST 1 VIEW: CPT

## 2021-01-01 PROCEDURE — 700111 HCHG RX REV CODE 636 W/ 250 OVERRIDE (IP): Performed by: ANESTHESIOLOGY

## 2021-01-01 PROCEDURE — 85347 COAGULATION TIME ACTIVATED: CPT

## 2021-01-01 PROCEDURE — 4A133J1 MONITORING OF ARTERIAL PULSE, PERIPHERAL, PERCUTANEOUS APPROACH: ICD-10-PCS | Performed by: INTERNAL MEDICINE

## 2021-01-01 PROCEDURE — 85025 COMPLETE CBC W/AUTO DIFF WBC: CPT | Mod: 91

## 2021-01-01 PROCEDURE — 84484 ASSAY OF TROPONIN QUANT: CPT

## 2021-01-01 PROCEDURE — 700102 HCHG RX REV CODE 250 W/ 637 OVERRIDE(OP): Performed by: NURSE PRACTITIONER

## 2021-01-01 PROCEDURE — 87086 URINE CULTURE/COLONY COUNT: CPT

## 2021-01-01 PROCEDURE — 83690 ASSAY OF LIPASE: CPT

## 2021-01-01 PROCEDURE — 700111 HCHG RX REV CODE 636 W/ 250 OVERRIDE (IP): Performed by: PHYSICIAN ASSISTANT

## 2021-01-01 PROCEDURE — 85384 FIBRINOGEN ACTIVITY: CPT | Mod: 91

## 2021-01-01 PROCEDURE — 85520 HEPARIN ASSAY: CPT | Mod: 91

## 2021-01-01 PROCEDURE — 84132 ASSAY OF SERUM POTASSIUM: CPT | Mod: 91

## 2021-01-01 PROCEDURE — 700101 HCHG RX REV CODE 250

## 2021-01-01 PROCEDURE — 51798 US URINE CAPACITY MEASURE: CPT

## 2021-01-01 PROCEDURE — 33995 INSJ PERQ VAD R HRT VENOUS: CPT | Performed by: INTERNAL MEDICINE

## 2021-01-01 PROCEDURE — C9803 HOPD COVID-19 SPEC COLLECT: HCPCS | Performed by: EMERGENCY MEDICINE

## 2021-01-01 PROCEDURE — 85576 BLOOD PLATELET AGGREGATION: CPT | Mod: 91

## 2021-01-01 PROCEDURE — 700102 HCHG RX REV CODE 250 W/ 637 OVERRIDE(OP): Performed by: INTERNAL MEDICINE

## 2021-01-01 PROCEDURE — 85027 COMPLETE CBC AUTOMATED: CPT

## 2021-01-01 PROCEDURE — 85520 HEPARIN ASSAY: CPT

## 2021-01-01 PROCEDURE — 500257: Performed by: THORACIC SURGERY (CARDIOTHORACIC VASCULAR SURGERY)

## 2021-01-01 PROCEDURE — 03HY32Z INSERTION OF MONITORING DEVICE INTO UPPER ARTERY, PERCUTANEOUS APPROACH: ICD-10-PCS | Performed by: INTERNAL MEDICINE

## 2021-01-01 PROCEDURE — 700111 HCHG RX REV CODE 636 W/ 250 OVERRIDE (IP): Performed by: NURSE PRACTITIONER

## 2021-01-01 PROCEDURE — 84703 CHORIONIC GONADOTROPIN ASSAY: CPT

## 2021-01-01 PROCEDURE — 87102 FUNGUS ISOLATION CULTURE: CPT

## 2021-01-01 PROCEDURE — 93308 TTE F-UP OR LMTD: CPT | Mod: 26 | Performed by: INTERNAL MEDICINE

## 2021-01-01 PROCEDURE — 99292 CRITICAL CARE ADDL 30 MIN: CPT | Mod: 25 | Performed by: INTERNAL MEDICINE

## 2021-01-01 PROCEDURE — 700101 HCHG RX REV CODE 250: Performed by: ANESTHESIOLOGY

## 2021-01-01 PROCEDURE — 93010 ELECTROCARDIOGRAM REPORT: CPT | Performed by: INTERNAL MEDICINE

## 2021-01-01 PROCEDURE — 0B9D8ZX DRAINAGE OF RIGHT MIDDLE LUNG LOBE, VIA NATURAL OR ARTIFICIAL OPENING ENDOSCOPIC, DIAGNOSTIC: ICD-10-PCS | Performed by: INTERNAL MEDICINE

## 2021-01-01 PROCEDURE — 84132 ASSAY OF SERUM POTASSIUM: CPT

## 2021-01-01 PROCEDURE — 36556 INSERT NON-TUNNEL CV CATH: CPT | Mod: LT | Performed by: INTERNAL MEDICINE

## 2021-01-01 PROCEDURE — 93308 TTE F-UP OR LMTD: CPT

## 2021-01-01 PROCEDURE — 99024 POSTOP FOLLOW-UP VISIT: CPT | Performed by: THORACIC SURGERY (CARDIOTHORACIC VASCULAR SURGERY)

## 2021-01-01 PROCEDURE — 84295 ASSAY OF SERUM SODIUM: CPT | Mod: 91

## 2021-01-01 PROCEDURE — 85014 HEMATOCRIT: CPT

## 2021-01-01 PROCEDURE — 85730 THROMBOPLASTIN TIME PARTIAL: CPT

## 2021-01-01 PROCEDURE — 99214 OFFICE O/P EST MOD 30 MIN: CPT | Performed by: NURSE PRACTITIONER

## 2021-01-01 PROCEDURE — 85610 PROTHROMBIN TIME: CPT

## 2021-01-01 PROCEDURE — 83605 ASSAY OF LACTIC ACID: CPT | Mod: 91

## 2021-01-01 PROCEDURE — 99291 CRITICAL CARE FIRST HOUR: CPT | Performed by: EMERGENCY MEDICINE

## 2021-01-01 PROCEDURE — 160009 HCHG ANES TIME/MIN: Performed by: THORACIC SURGERY (CARDIOTHORACIC VASCULAR SURGERY)

## 2021-01-01 PROCEDURE — 94002 VENT MGMT INPAT INIT DAY: CPT

## 2021-01-01 PROCEDURE — 87040 BLOOD CULTURE FOR BACTERIA: CPT

## 2021-01-01 PROCEDURE — 87070 CULTURE OTHR SPECIMN AEROBIC: CPT

## 2021-01-01 PROCEDURE — 501745 HCHG WIRE, SURGICAL STEEL: Performed by: THORACIC SURGERY (CARDIOTHORACIC VASCULAR SURGERY)

## 2021-01-01 PROCEDURE — P9016 RBC LEUKOCYTES REDUCED: HCPCS

## 2021-01-01 PROCEDURE — 306651

## 2021-01-01 PROCEDURE — A9270 NON-COVERED ITEM OR SERVICE: HCPCS | Performed by: INTERNAL MEDICINE

## 2021-01-01 PROCEDURE — 83036 HEMOGLOBIN GLYCOSYLATED A1C: CPT

## 2021-01-01 PROCEDURE — 87205 SMEAR GRAM STAIN: CPT | Mod: 91

## 2021-01-01 PROCEDURE — 94760 N-INVAS EAR/PLS OXIMETRY 1: CPT

## 2021-01-01 PROCEDURE — 700111 HCHG RX REV CODE 636 W/ 250 OVERRIDE (IP): Performed by: EMERGENCY MEDICINE

## 2021-01-01 PROCEDURE — 31500 INSERT EMERGENCY AIRWAY: CPT

## 2021-01-01 PROCEDURE — 83880 ASSAY OF NATRIURETIC PEPTIDE: CPT

## 2021-01-01 PROCEDURE — 500002 HCHG ADHESIVE, DERMABOND: Performed by: THORACIC SURGERY (CARDIOTHORACIC VASCULAR SURGERY)

## 2021-01-01 PROCEDURE — 96365 THER/PROPH/DIAG IV INF INIT: CPT

## 2021-01-01 PROCEDURE — 82803 BLOOD GASES ANY COMBINATION: CPT

## 2021-01-01 PROCEDURE — 99291 CRITICAL CARE FIRST HOUR: CPT | Mod: 25 | Performed by: INTERNAL MEDICINE

## 2021-01-01 PROCEDURE — 85049 AUTOMATED PLATELET COUNT: CPT

## 2021-01-01 PROCEDURE — 87040 BLOOD CULTURE FOR BACTERIA: CPT | Mod: 91

## 2021-01-01 PROCEDURE — 93325 DOPPLER ECHO COLOR FLOW MAPG: CPT

## 2021-01-01 PROCEDURE — P9047 ALBUMIN (HUMAN), 25%, 50ML: HCPCS

## 2021-01-01 PROCEDURE — 5A0221D ASSISTANCE WITH CARDIAC OUTPUT USING IMPELLER PUMP, CONTINUOUS: ICD-10-PCS | Performed by: INTERNAL MEDICINE

## 2021-01-01 PROCEDURE — 94640 AIRWAY INHALATION TREATMENT: CPT

## 2021-01-01 PROCEDURE — 94003 VENT MGMT INPAT SUBQ DAY: CPT

## 2021-01-01 PROCEDURE — 02HV33Z INSERTION OF INFUSION DEVICE INTO SUPERIOR VENA CAVA, PERCUTANEOUS APPROACH: ICD-10-PCS | Performed by: INTERNAL MEDICINE

## 2021-01-01 PROCEDURE — 85730 THROMBOPLASTIN TIME PARTIAL: CPT | Mod: 91

## 2021-01-01 PROCEDURE — 700105 HCHG RX REV CODE 258: Performed by: PHYSICIAN ASSISTANT

## 2021-01-01 PROCEDURE — 87186 SC STD MICRODIL/AGAR DIL: CPT | Mod: 91

## 2021-01-01 PROCEDURE — 86850 RBC ANTIBODY SCREEN: CPT

## 2021-01-01 PROCEDURE — 85018 HEMOGLOBIN: CPT

## 2021-01-01 PROCEDURE — 503050 HCHG COR-KNOT QUICK LOADS 6PACK: Performed by: THORACIC SURGERY (CARDIOTHORACIC VASCULAR SURGERY)

## 2021-01-01 PROCEDURE — 80053 COMPREHEN METABOLIC PANEL: CPT

## 2021-01-01 PROCEDURE — 82962 GLUCOSE BLOOD TEST: CPT | Mod: 91

## 2021-01-01 PROCEDURE — P9034 PLATELETS, PHERESIS: HCPCS

## 2021-01-01 PROCEDURE — 700101 HCHG RX REV CODE 250: Performed by: NURSE PRACTITIONER

## 2021-01-01 PROCEDURE — 36415 COLL VENOUS BLD VENIPUNCTURE: CPT

## 2021-01-01 PROCEDURE — 700101 HCHG RX REV CODE 250: Performed by: PHYSICIAN ASSISTANT

## 2021-01-01 PROCEDURE — 84443 ASSAY THYROID STIM HORMONE: CPT

## 2021-01-01 PROCEDURE — 33464 VALVULOPLASTY TRICUSPID: CPT | Mod: 80,51 | Performed by: THORACIC SURGERY (CARDIOTHORACIC VASCULAR SURGERY)

## 2021-01-01 PROCEDURE — 503000 HCHG SUTURE, OHS: Performed by: THORACIC SURGERY (CARDIOTHORACIC VASCULAR SURGERY)

## 2021-01-01 PROCEDURE — 80074 ACUTE HEPATITIS PANEL: CPT

## 2021-01-01 PROCEDURE — 80048 BASIC METABOLIC PNL TOTAL CA: CPT | Mod: 91

## 2021-01-01 PROCEDURE — 87186 SC STD MICRODIL/AGAR DIL: CPT

## 2021-01-01 PROCEDURE — 700101 HCHG RX REV CODE 250: Performed by: EMERGENCY MEDICINE

## 2021-01-01 PROCEDURE — 31624 DX BRONCHOSCOPE/LAVAGE: CPT | Performed by: INTERNAL MEDICINE

## 2021-01-01 PROCEDURE — 88112 CYTOPATH CELL ENHANCE TECH: CPT

## 2021-01-01 PROCEDURE — 99233 SBSQ HOSP IP/OBS HIGH 50: CPT | Performed by: INTERNAL MEDICINE

## 2021-01-01 PROCEDURE — 82550 ASSAY OF CK (CPK): CPT

## 2021-01-01 PROCEDURE — 99232 SBSQ HOSP IP/OBS MODERATE 35: CPT | Performed by: INTERNAL MEDICINE

## 2021-01-01 PROCEDURE — 93451 RIGHT HEART CATH: CPT | Mod: 26 | Performed by: INTERNAL MEDICINE

## 2021-01-01 PROCEDURE — 92950 HEART/LUNG RESUSCITATION CPR: CPT

## 2021-01-01 PROCEDURE — 76705 ECHO EXAM OF ABDOMEN: CPT

## 2021-01-01 PROCEDURE — 99222 1ST HOSP IP/OBS MODERATE 55: CPT | Performed by: THORACIC SURGERY (CARDIOTHORACIC VASCULAR SURGERY)

## 2021-01-01 PROCEDURE — 160042 HCHG SURGERY MINUTES - EA ADDL 1 MIN LEVEL 5: Performed by: THORACIC SURGERY (CARDIOTHORACIC VASCULAR SURGERY)

## 2021-01-01 PROCEDURE — 93005 ELECTROCARDIOGRAM TRACING: CPT | Performed by: PHYSICIAN ASSISTANT

## 2021-01-01 PROCEDURE — 33257 ABLATE ATRIA LMTD ADD-ON: CPT | Mod: 80 | Performed by: THORACIC SURGERY (CARDIOTHORACIC VASCULAR SURGERY)

## 2021-01-01 PROCEDURE — 36556 INSERT NON-TUNNEL CV CATH: CPT

## 2021-01-01 PROCEDURE — 4A133B1 MONITORING OF ARTERIAL PRESSURE, PERIPHERAL, PERCUTANEOUS APPROACH: ICD-10-PCS | Performed by: INTERNAL MEDICINE

## 2021-01-01 PROCEDURE — 99152 MOD SED SAME PHYS/QHP 5/>YRS: CPT | Performed by: INTERNAL MEDICINE

## 2021-01-01 PROCEDURE — 4A023N6 MEASUREMENT OF CARDIAC SAMPLING AND PRESSURE, RIGHT HEART, PERCUTANEOUS APPROACH: ICD-10-PCS | Performed by: INTERNAL MEDICINE

## 2021-01-01 PROCEDURE — 81001 URINALYSIS AUTO W/SCOPE: CPT

## 2021-01-01 PROCEDURE — 85384 FIBRINOGEN ACTIVITY: CPT

## 2021-01-01 PROCEDURE — 30233N1 TRANSFUSION OF NONAUTOLOGOUS RED BLOOD CELLS INTO PERIPHERAL VEIN, PERCUTANEOUS APPROACH: ICD-10-PCS | Performed by: NURSE PRACTITIONER

## 2021-01-01 PROCEDURE — 93000 ELECTROCARDIOGRAM COMPLETE: CPT | Performed by: INTERNAL MEDICINE

## 2021-01-01 PROCEDURE — 87077 CULTURE AEROBIC IDENTIFY: CPT | Mod: 91

## 2021-01-01 PROCEDURE — 500890 HCHG PACK, OPEN HEART: Performed by: THORACIC SURGERY (CARDIOTHORACIC VASCULAR SURGERY)

## 2021-01-01 PROCEDURE — P9012 CRYOPRECIPITATE EACH UNIT: HCPCS | Mod: 91

## 2021-01-01 PROCEDURE — 33464 VALVULOPLASTY TRICUSPID: CPT | Mod: 51 | Performed by: THORACIC SURGERY (CARDIOTHORACIC VASCULAR SURGERY)

## 2021-01-01 PROCEDURE — 85014 HEMATOCRIT: CPT | Mod: 91

## 2021-01-01 PROCEDURE — 99291 CRITICAL CARE FIRST HOUR: CPT

## 2021-01-01 PROCEDURE — 5A1221Z PERFORMANCE OF CARDIAC OUTPUT, CONTINUOUS: ICD-10-PCS | Performed by: THORACIC SURGERY (CARDIOTHORACIC VASCULAR SURGERY)

## 2021-01-01 PROCEDURE — 86923 COMPATIBILITY TEST ELECTRIC: CPT | Mod: 91

## 2021-01-01 PROCEDURE — 02B70ZK EXCISION OF LEFT ATRIAL APPENDAGE, OPEN APPROACH: ICD-10-PCS | Performed by: THORACIC SURGERY (CARDIOTHORACIC VASCULAR SURGERY)

## 2021-01-01 PROCEDURE — 30233L1 TRANSFUSION OF NONAUTOLOGOUS FRESH PLASMA INTO PERIPHERAL VEIN, PERCUTANEOUS APPROACH: ICD-10-PCS | Performed by: CLINICAL NURSE SPECIALIST

## 2021-01-01 PROCEDURE — 160048 HCHG OR STATISTICAL LEVEL 1-5: Performed by: THORACIC SURGERY (CARDIOTHORACIC VASCULAR SURGERY)

## 2021-01-01 PROCEDURE — 500734 HCHG INSERT, STEALTH: Performed by: THORACIC SURGERY (CARDIOTHORACIC VASCULAR SURGERY)

## 2021-01-01 PROCEDURE — 31500 INSERT EMERGENCY AIRWAY: CPT | Performed by: INTERNAL MEDICINE

## 2021-01-01 PROCEDURE — 02HA3RZ INSERTION OF SHORT-TERM EXTERNAL HEART ASSIST SYSTEM INTO HEART, PERCUTANEOUS APPROACH: ICD-10-PCS | Performed by: INTERNAL MEDICINE

## 2021-01-01 PROCEDURE — 70450 CT HEAD/BRAIN W/O DYE: CPT

## 2021-01-01 PROCEDURE — 02RG08Z REPLACEMENT OF MITRAL VALVE WITH ZOOPLASTIC TISSUE, OPEN APPROACH: ICD-10-PCS | Performed by: THORACIC SURGERY (CARDIOTHORACIC VASCULAR SURGERY)

## 2021-01-01 PROCEDURE — 82150 ASSAY OF AMYLASE: CPT

## 2021-01-01 PROCEDURE — 82330 ASSAY OF CALCIUM: CPT | Mod: 91

## 2021-01-01 PROCEDURE — 33430 REPLACEMENT OF MITRAL VALVE: CPT | Mod: 80 | Performed by: THORACIC SURGERY (CARDIOTHORACIC VASCULAR SURGERY)

## 2021-01-01 PROCEDURE — 700105 HCHG RX REV CODE 258: Performed by: NURSE PRACTITIONER

## 2021-01-01 PROCEDURE — 503001 HCHG PERFUSION: Performed by: THORACIC SURGERY (CARDIOTHORACIC VASCULAR SURGERY)

## 2021-01-01 PROCEDURE — 02UJ0JZ SUPPLEMENT TRICUSPID VALVE WITH SYNTHETIC SUBSTITUTE, OPEN APPROACH: ICD-10-PCS | Performed by: THORACIC SURGERY (CARDIOTHORACIC VASCULAR SURGERY)

## 2021-01-01 PROCEDURE — 86900 BLOOD TYPING SEROLOGIC ABO: CPT

## 2021-01-01 PROCEDURE — 36620 INSERTION CATHETER ARTERY: CPT

## 2021-01-01 PROCEDURE — 0B9F8ZX DRAINAGE OF RIGHT LOWER LUNG LOBE, VIA NATURAL OR ARTIFICIAL OPENING ENDOSCOPIC, DIAGNOSTIC: ICD-10-PCS | Performed by: INTERNAL MEDICINE

## 2021-01-01 PROCEDURE — C1889 IMPLANT/INSERT DEVICE, NOC: HCPCS | Performed by: THORACIC SURGERY (CARDIOTHORACIC VASCULAR SURGERY)

## 2021-01-01 PROCEDURE — 302978 HCHG BRONCHOSCOPY-DIAGNOSTIC

## 2021-01-01 PROCEDURE — C1751 CATH, INF, PER/CENT/MIDLINE: HCPCS | Performed by: THORACIC SURGERY (CARDIOTHORACIC VASCULAR SURGERY)

## 2021-01-01 PROCEDURE — 80053 COMPREHEN METABOLIC PANEL: CPT | Mod: 91

## 2021-01-01 PROCEDURE — A9270 NON-COVERED ITEM OR SERVICE: HCPCS | Performed by: NURSE PRACTITIONER

## 2021-01-01 PROCEDURE — 99205 OFFICE O/P NEW HI 60 MIN: CPT | Performed by: THORACIC SURGERY (CARDIOTHORACIC VASCULAR SURGERY)

## 2021-01-01 PROCEDURE — 84100 ASSAY OF PHOSPHORUS: CPT

## 2021-01-01 PROCEDURE — 96375 TX/PRO/DX INJ NEW DRUG ADDON: CPT

## 2021-01-01 PROCEDURE — C1898 LEAD, PMKR, OTHER THAN TRANS: HCPCS | Performed by: THORACIC SURGERY (CARDIOTHORACIC VASCULAR SURGERY)

## 2021-01-01 PROCEDURE — 86706 HEP B SURFACE ANTIBODY: CPT

## 2021-01-01 PROCEDURE — 33430 REPLACEMENT OF MITRAL VALVE: CPT | Performed by: THORACIC SURGERY (CARDIOTHORACIC VASCULAR SURGERY)

## 2021-01-01 PROCEDURE — 85347 COAGULATION TIME ACTIVATED: CPT | Mod: 91

## 2021-01-01 PROCEDURE — 5A1935Z RESPIRATORY VENTILATION, LESS THAN 24 CONSECUTIVE HOURS: ICD-10-PCS | Performed by: INTERNAL MEDICINE

## 2021-01-01 PROCEDURE — 82803 BLOOD GASES ANY COMBINATION: CPT | Mod: 91

## 2021-01-01 PROCEDURE — 700105 HCHG RX REV CODE 258: Performed by: ANESTHESIOLOGY

## 2021-01-01 PROCEDURE — 700101 HCHG RX REV CODE 250: Performed by: INTERNAL MEDICINE

## 2021-01-01 PROCEDURE — 31645 BRNCHSC W/THER ASPIR 1ST: CPT | Performed by: INTERNAL MEDICINE

## 2021-01-01 PROCEDURE — 700105 HCHG RX REV CODE 258: Performed by: EMERGENCY MEDICINE

## 2021-01-01 PROCEDURE — 88311 DECALCIFY TISSUE: CPT

## 2021-01-01 PROCEDURE — 88305 TISSUE EXAM BY PATHOLOGIST: CPT

## 2021-01-01 PROCEDURE — 88304 TISSUE EXAM BY PATHOLOGIST: CPT

## 2021-01-01 PROCEDURE — RXMED WILLOW AMBULATORY MEDICATION CHARGE: Performed by: NURSE PRACTITIONER

## 2021-01-01 PROCEDURE — 83735 ASSAY OF MAGNESIUM: CPT | Mod: 91

## 2021-01-01 PROCEDURE — 99223 1ST HOSP IP/OBS HIGH 75: CPT | Mod: 25 | Performed by: INTERNAL MEDICINE

## 2021-01-01 PROCEDURE — 0BH17EZ INSERTION OF ENDOTRACHEAL AIRWAY INTO TRACHEA, VIA NATURAL OR ARTIFICIAL OPENING: ICD-10-PCS | Performed by: INTERNAL MEDICINE

## 2021-01-01 PROCEDURE — 0241U HCHG SARS-COV-2 COVID-19 NFCT DS RESP RNA 4 TRGT MIC: CPT

## 2021-01-01 PROCEDURE — 71046 X-RAY EXAM CHEST 2 VIEWS: CPT

## 2021-01-01 PROCEDURE — 93005 ELECTROCARDIOGRAM TRACING: CPT | Performed by: EMERGENCY MEDICINE

## 2021-01-01 PROCEDURE — B24BZZ4 ULTRASONOGRAPHY OF HEART WITH AORTA, TRANSESOPHAGEAL: ICD-10-PCS | Performed by: THORACIC SURGERY (CARDIOTHORACIC VASCULAR SURGERY)

## 2021-01-01 PROCEDURE — 700101 HCHG RX REV CODE 250: Performed by: CLINICAL NURSE SPECIALIST

## 2021-01-01 PROCEDURE — 93882 EXTRACRANIAL UNI/LTD STUDY: CPT | Mod: 26,RT | Performed by: INTERNAL MEDICINE

## 2021-01-01 PROCEDURE — 93882 EXTRACRANIAL UNI/LTD STUDY: CPT | Mod: RT

## 2021-01-01 PROCEDURE — 93325 DOPPLER ECHO COLOR FLOW MAPG: CPT | Mod: 26 | Performed by: INTERNAL MEDICINE

## 2021-01-01 PROCEDURE — 84295 ASSAY OF SERUM SODIUM: CPT

## 2021-01-01 PROCEDURE — C1725 CATH, TRANSLUMIN NON-LASER: HCPCS | Performed by: THORACIC SURGERY (CARDIOTHORACIC VASCULAR SURGERY)

## 2021-01-01 PROCEDURE — 99223 1ST HOSP IP/OBS HIGH 75: CPT | Performed by: STUDENT IN AN ORGANIZED HEALTH CARE EDUCATION/TRAINING PROGRAM

## 2021-01-01 PROCEDURE — C1894 INTRO/SHEATH, NON-LASER: HCPCS | Performed by: THORACIC SURGERY (CARDIOTHORACIC VASCULAR SURGERY)

## 2021-01-01 PROCEDURE — 96366 THER/PROPH/DIAG IV INF ADDON: CPT

## 2021-01-01 PROCEDURE — 33257 ABLATE ATRIA LMTD ADD-ON: CPT | Performed by: THORACIC SURGERY (CARDIOTHORACIC VASCULAR SURGERY)

## 2021-01-01 PROCEDURE — 84145 PROCALCITONIN (PCT): CPT

## 2021-01-01 PROCEDURE — 82947 ASSAY GLUCOSE BLOOD QUANT: CPT

## 2021-01-01 DEVICE — VALVE MOSAIC MITRAL 29MM ---ALWAYS OVERNITE INCLUDING: Type: IMPLANTABLE DEVICE | Site: CHEST | Status: FUNCTIONAL

## 2021-01-01 DEVICE — ANNULO. RING MITRAL 4600-38 ---ALWAYS SHIP OVERNIGHT---: Type: IMPLANTABLE DEVICE | Site: CHEST | Status: FUNCTIONAL

## 2021-01-01 RX ORDER — LIDOCAINE HYDROCHLORIDE 10 MG/ML
INJECTION, SOLUTION INFILTRATION; PERINEURAL
Status: DISCONTINUED
Start: 2021-01-01 | End: 2021-01-01

## 2021-01-01 RX ORDER — NOREPINEPHRINE BITARTRATE 0.03 MG/ML
0-30 INJECTION, SOLUTION INTRAVENOUS CONTINUOUS
Status: DISCONTINUED | OUTPATIENT
Start: 2021-01-01 | End: 2021-01-01

## 2021-01-01 RX ORDER — VASOPRESSIN 20 U/ML
INJECTION PARENTERAL PRN
Status: DISCONTINUED | OUTPATIENT
Start: 2021-01-01 | End: 2021-01-01 | Stop reason: SURG

## 2021-01-01 RX ORDER — ROCURONIUM BROMIDE 10 MG/ML
80 INJECTION, SOLUTION INTRAVENOUS ONCE
Status: COMPLETED | OUTPATIENT
Start: 2021-01-01 | End: 2021-01-01

## 2021-01-01 RX ORDER — DEXTROSE MONOHYDRATE 25 G/50ML
50 INJECTION, SOLUTION INTRAVENOUS ONCE
Status: COMPLETED | OUTPATIENT
Start: 2021-01-01 | End: 2021-01-01

## 2021-01-01 RX ORDER — BUTALBITAL, ACETAMINOPHEN AND CAFFEINE 50; 325; 40 MG/1; MG/1; MG/1
1 TABLET ORAL EVERY 6 HOURS PRN
Status: DISCONTINUED | OUTPATIENT
Start: 2021-01-01 | End: 2021-01-01

## 2021-01-01 RX ORDER — POTASSIUM CHLORIDE 7.45 MG/ML
10 INJECTION INTRAVENOUS ONCE
Status: COMPLETED | OUTPATIENT
Start: 2021-01-01 | End: 2021-01-01

## 2021-01-01 RX ORDER — FUROSEMIDE 10 MG/ML
40 INJECTION INTRAMUSCULAR; INTRAVENOUS 3 TIMES DAILY
Status: DISCONTINUED | OUTPATIENT
Start: 2021-01-01 | End: 2021-01-01

## 2021-01-01 RX ORDER — MAGNESIUM SULFATE HEPTAHYDRATE 40 MG/ML
2 INJECTION, SOLUTION INTRAVENOUS ONCE
Status: COMPLETED | OUTPATIENT
Start: 2021-01-01 | End: 2021-01-01

## 2021-01-01 RX ORDER — EPINEPHRINE IN SOD CHLOR,ISO 1 MG/10 ML
SYRINGE (ML) INTRAVENOUS
Status: COMPLETED
Start: 2021-01-01 | End: 2021-01-01

## 2021-01-01 RX ORDER — POTASSIUM CHLORIDE 14.9 MG/ML
20 INJECTION INTRAVENOUS ONCE
Status: COMPLETED | OUTPATIENT
Start: 2021-01-01 | End: 2021-01-01

## 2021-01-01 RX ORDER — PHENYLEPHRINE HYDROCHLORIDE 10 MG/ML
INJECTION, SOLUTION INTRAMUSCULAR; INTRAVENOUS; SUBCUTANEOUS PRN
Status: DISCONTINUED | OUTPATIENT
Start: 2021-01-01 | End: 2021-01-01 | Stop reason: SURG

## 2021-01-01 RX ORDER — OXYCODONE HYDROCHLORIDE 10 MG/1
10 TABLET ORAL
Status: DISCONTINUED | OUTPATIENT
Start: 2021-01-01 | End: 2021-01-01

## 2021-01-01 RX ORDER — DEXMEDETOMIDINE HYDROCHLORIDE 4 UG/ML
0-1.5 INJECTION, SOLUTION INTRAVENOUS CONTINUOUS
Status: DISCONTINUED | OUTPATIENT
Start: 2021-01-01 | End: 2021-01-01

## 2021-01-01 RX ORDER — HEPARIN SODIUM 1000 [USP'U]/ML
80 INJECTION, SOLUTION INTRAVENOUS; SUBCUTANEOUS ONCE
Status: DISCONTINUED | OUTPATIENT
Start: 2021-01-01 | End: 2021-01-01

## 2021-01-01 RX ORDER — FUROSEMIDE 20 MG/1
40 TABLET ORAL 2 TIMES DAILY
Qty: 60 TAB | Refills: 0 | Status: SHIPPED | OUTPATIENT
Start: 2021-01-01 | End: 2021-01-01 | Stop reason: SDUPTHER

## 2021-01-01 RX ORDER — HEPARIN SODIUM 5000 [USP'U]/100ML
0-30 INJECTION, SOLUTION INTRAVENOUS CONTINUOUS
Status: DISCONTINUED | OUTPATIENT
Start: 2021-01-01 | End: 2021-01-01

## 2021-01-01 RX ORDER — FUROSEMIDE 40 MG/1
40 TABLET ORAL 2 TIMES DAILY
Qty: 180 TAB | Refills: 0 | Status: SHIPPED | OUTPATIENT
Start: 2021-01-01

## 2021-01-01 RX ORDER — SODIUM CHLORIDE 9 MG/ML
INJECTION, SOLUTION INTRAVENOUS CONTINUOUS
Status: DISCONTINUED | OUTPATIENT
Start: 2021-01-01 | End: 2021-01-01

## 2021-01-01 RX ORDER — DEXTROSE MONOHYDRATE 25 G/50ML
50 INJECTION, SOLUTION INTRAVENOUS PRN
Status: ACTIVE | OUTPATIENT
Start: 2021-01-01 | End: 2021-01-01

## 2021-01-01 RX ORDER — BISACODYL 10 MG
10 SUPPOSITORY, RECTAL RECTAL
Status: DISCONTINUED | OUTPATIENT
Start: 2021-01-01 | End: 2021-01-01

## 2021-01-01 RX ORDER — EPINEPHRINE HCL IN 0.9 % NACL 4MG/250ML
0-.2 PLASTIC BAG, INJECTION (ML) INTRAVENOUS CONTINUOUS
Status: DISCONTINUED | OUTPATIENT
Start: 2021-01-01 | End: 2021-01-01

## 2021-01-01 RX ORDER — ACETAMINOPHEN 500 MG
1000 TABLET ORAL ONCE
Status: COMPLETED | OUTPATIENT
Start: 2021-01-01 | End: 2021-01-01

## 2021-01-01 RX ORDER — HEPARIN SODIUM,PORCINE 1000/ML
VIAL (ML) INJECTION PRN
Status: DISCONTINUED | OUTPATIENT
Start: 2021-01-01 | End: 2021-01-01 | Stop reason: SURG

## 2021-01-01 RX ORDER — PHYTONADIONE 5 MG/1
10 TABLET ORAL ONCE
Status: COMPLETED | OUTPATIENT
Start: 2021-01-01 | End: 2021-01-01

## 2021-01-01 RX ORDER — ONDANSETRON 2 MG/ML
4 INJECTION INTRAMUSCULAR; INTRAVENOUS EVERY 4 HOURS PRN
Status: DISCONTINUED | OUTPATIENT
Start: 2021-01-01 | End: 2021-01-01

## 2021-01-01 RX ORDER — FUROSEMIDE 10 MG/ML
60 INJECTION INTRAMUSCULAR; INTRAVENOUS 3 TIMES DAILY
Status: DISCONTINUED | OUTPATIENT
Start: 2021-01-01 | End: 2021-01-01

## 2021-01-01 RX ORDER — DIGOXIN 0.25 MG/ML
250 INJECTION INTRAMUSCULAR; INTRAVENOUS ONCE
Status: DISCONTINUED | OUTPATIENT
Start: 2021-01-01 | End: 2021-01-01

## 2021-01-01 RX ORDER — ETOMIDATE 2 MG/ML
20 INJECTION INTRAVENOUS ONCE
Status: COMPLETED | OUTPATIENT
Start: 2021-01-01 | End: 2021-01-01

## 2021-01-01 RX ORDER — ACETAMINOPHEN 325 MG/1
650 TABLET ORAL EVERY 6 HOURS PRN
Status: DISCONTINUED | OUTPATIENT
Start: 2021-01-01 | End: 2021-01-01

## 2021-01-01 RX ORDER — ASPIRIN 81 MG/1
81 TABLET, CHEWABLE ORAL DAILY
Status: DISCONTINUED | OUTPATIENT
Start: 2021-02-11 | End: 2021-02-11 | Stop reason: HOSPADM

## 2021-01-01 RX ORDER — DEXTROSE MONOHYDRATE 25 G/50ML
50 INJECTION, SOLUTION INTRAVENOUS
Status: DISCONTINUED | OUTPATIENT
Start: 2021-01-01 | End: 2021-02-11 | Stop reason: HOSPADM

## 2021-01-01 RX ORDER — TRAMADOL HYDROCHLORIDE 50 MG/1
50 TABLET ORAL EVERY 4 HOURS PRN
Status: DISCONTINUED | OUTPATIENT
Start: 2021-01-01 | End: 2021-01-01

## 2021-01-01 RX ORDER — AMIODARONE HYDROCHLORIDE 150 MG/3ML
INJECTION, SOLUTION INTRAVENOUS PRN
Status: DISCONTINUED | OUTPATIENT
Start: 2021-01-01 | End: 2021-01-01 | Stop reason: SURG

## 2021-01-01 RX ORDER — ACETAMINOPHEN 500 MG
1000 TABLET ORAL EVERY 6 HOURS
Status: DISCONTINUED | OUTPATIENT
Start: 2021-01-01 | End: 2021-02-11 | Stop reason: HOSPADM

## 2021-01-01 RX ORDER — FUROSEMIDE 10 MG/ML
20 INJECTION INTRAMUSCULAR; INTRAVENOUS ONCE
Status: COMPLETED | OUTPATIENT
Start: 2021-01-01 | End: 2021-01-01

## 2021-01-01 RX ORDER — AMOXICILLIN 250 MG
2 CAPSULE ORAL 2 TIMES DAILY
Status: DISCONTINUED | OUTPATIENT
Start: 2021-01-01 | End: 2021-01-01

## 2021-01-01 RX ORDER — HEPARIN SODIUM 1000 [USP'U]/ML
40 INJECTION, SOLUTION INTRAVENOUS; SUBCUTANEOUS PRN
Status: DISCONTINUED | OUTPATIENT
Start: 2021-01-01 | End: 2021-01-01

## 2021-01-01 RX ORDER — SODIUM CHLORIDE, SODIUM LACTATE, POTASSIUM CHLORIDE, AND CALCIUM CHLORIDE .6; .31; .03; .02 G/100ML; G/100ML; G/100ML; G/100ML
30 INJECTION, SOLUTION INTRAVENOUS
Status: COMPLETED | OUTPATIENT
Start: 2021-01-01 | End: 2021-01-01

## 2021-01-01 RX ORDER — POLYETHYLENE GLYCOL 3350 17 G/17G
1 POWDER, FOR SOLUTION ORAL DAILY
Status: DISCONTINUED | OUTPATIENT
Start: 2021-01-01 | End: 2021-01-01

## 2021-01-01 RX ORDER — VECURONIUM BROMIDE 1 MG/ML
INJECTION, POWDER, LYOPHILIZED, FOR SOLUTION INTRAVENOUS PRN
Status: DISCONTINUED | OUTPATIENT
Start: 2021-01-01 | End: 2021-01-01 | Stop reason: SURG

## 2021-01-01 RX ORDER — HEPARIN SODIUM 5000 [USP'U]/100ML
0-1000 INJECTION, SOLUTION INTRAVENOUS CONTINUOUS
Status: DISCONTINUED | OUTPATIENT
Start: 2021-01-01 | End: 2021-01-01

## 2021-01-01 RX ORDER — OXYCODONE HYDROCHLORIDE 10 MG/1
10 TABLET ORAL
Status: DISCONTINUED | OUTPATIENT
Start: 2021-01-01 | End: 2021-02-11 | Stop reason: HOSPADM

## 2021-01-01 RX ORDER — FUROSEMIDE 10 MG/ML
40 INJECTION INTRAMUSCULAR; INTRAVENOUS
Status: DISCONTINUED | OUTPATIENT
Start: 2021-01-01 | End: 2021-01-01

## 2021-01-01 RX ORDER — SODIUM CHLORIDE, SODIUM GLUCONATE, SODIUM ACETATE, POTASSIUM CHLORIDE AND MAGNESIUM CHLORIDE 526; 502; 368; 37; 30 MG/100ML; MG/100ML; MG/100ML; MG/100ML; MG/100ML
INJECTION, SOLUTION INTRAVENOUS PRN
Status: DISCONTINUED | OUTPATIENT
Start: 2021-01-01 | End: 2021-01-01

## 2021-01-01 RX ORDER — SODIUM CHLORIDE, SODIUM LACTATE, POTASSIUM CHLORIDE, CALCIUM CHLORIDE 600; 310; 30; 20 MG/100ML; MG/100ML; MG/100ML; MG/100ML
INJECTION, SOLUTION INTRAVENOUS
Status: DISCONTINUED | OUTPATIENT
Start: 2021-01-01 | End: 2021-01-01 | Stop reason: SURG

## 2021-01-01 RX ORDER — FUROSEMIDE 10 MG/ML
60 INJECTION INTRAMUSCULAR; INTRAVENOUS ONCE
Status: COMPLETED | OUTPATIENT
Start: 2021-01-01 | End: 2021-01-01

## 2021-01-01 RX ORDER — DEXTROSE MONOHYDRATE 25 G/50ML
INJECTION, SOLUTION INTRAVENOUS
Status: COMPLETED
Start: 2021-01-01 | End: 2021-01-01

## 2021-01-01 RX ORDER — ROCURONIUM BROMIDE 10 MG/ML
0.6 INJECTION, SOLUTION INTRAVENOUS ONCE
Status: COMPLETED | OUTPATIENT
Start: 2021-01-01 | End: 2021-01-01

## 2021-01-01 RX ORDER — MIDAZOLAM HYDROCHLORIDE 1 MG/ML
2 INJECTION INTRAMUSCULAR; INTRAVENOUS
Status: DISCONTINUED | OUTPATIENT
Start: 2021-01-01 | End: 2021-01-01

## 2021-01-01 RX ORDER — DIPHENHYDRAMINE HCL 25 MG
25 TABLET ORAL
Status: DISCONTINUED | OUTPATIENT
Start: 2021-01-01 | End: 2021-01-01

## 2021-01-01 RX ORDER — DEXTROSE MONOHYDRATE 25 G/50ML
50 INJECTION, SOLUTION INTRAVENOUS PRN
Status: DISCONTINUED | OUTPATIENT
Start: 2021-01-01 | End: 2021-01-01

## 2021-01-01 RX ORDER — SODIUM CHLORIDE, SODIUM GLUCONATE, SODIUM ACETATE, POTASSIUM CHLORIDE AND MAGNESIUM CHLORIDE 526; 502; 368; 37; 30 MG/100ML; MG/100ML; MG/100ML; MG/100ML; MG/100ML
1000 INJECTION, SOLUTION INTRAVENOUS PRN
Status: DISCONTINUED | OUTPATIENT
Start: 2021-01-01 | End: 2021-01-01

## 2021-01-01 RX ORDER — LORAZEPAM 2 MG/ML
INJECTION INTRAMUSCULAR
Status: COMPLETED
Start: 2021-01-01 | End: 2021-01-01

## 2021-01-01 RX ORDER — PROTAMINE SULFATE 10 MG/ML
INJECTION, SOLUTION INTRAVENOUS PRN
Status: DISCONTINUED | OUTPATIENT
Start: 2021-01-01 | End: 2021-01-01 | Stop reason: SURG

## 2021-01-01 RX ORDER — ALUMINA, MAGNESIA, AND SIMETHICONE 2400; 2400; 240 MG/30ML; MG/30ML; MG/30ML
30 SUSPENSION ORAL EVERY 4 HOURS PRN
Status: DISCONTINUED | OUTPATIENT
Start: 2021-01-01 | End: 2021-01-01

## 2021-01-01 RX ORDER — POTASSIUM CHLORIDE 750 MG/1
10 TABLET, EXTENDED RELEASE ORAL 2 TIMES DAILY
Qty: 180 TAB | Refills: 0 | Status: SHIPPED | OUTPATIENT
Start: 2021-01-01

## 2021-01-01 RX ORDER — DIGOXIN 0.25 MG/ML
500 INJECTION INTRAMUSCULAR; INTRAVENOUS ONCE
Status: COMPLETED | OUTPATIENT
Start: 2021-01-01 | End: 2021-01-01

## 2021-01-01 RX ORDER — SODIUM CHLORIDE, SODIUM GLUCONATE, SODIUM ACETATE, POTASSIUM CHLORIDE AND MAGNESIUM CHLORIDE 526; 502; 368; 37; 30 MG/100ML; MG/100ML; MG/100ML; MG/100ML; MG/100ML
INJECTION, SOLUTION INTRAVENOUS
Status: DISCONTINUED | OUTPATIENT
Start: 2021-01-01 | End: 2021-01-01 | Stop reason: SURG

## 2021-01-01 RX ORDER — MORPHINE SULFATE 10 MG/ML
4 INJECTION, SOLUTION INTRAMUSCULAR; INTRAVENOUS
Status: DISCONTINUED | OUTPATIENT
Start: 2021-01-01 | End: 2021-01-01

## 2021-01-01 RX ORDER — POTASSIUM CHLORIDE 750 MG/1
10 TABLET, EXTENDED RELEASE ORAL 2 TIMES DAILY
Qty: 60 TAB | Refills: 0 | Status: SHIPPED | OUTPATIENT
Start: 2021-01-01 | End: 2021-01-01 | Stop reason: SDUPTHER

## 2021-01-01 RX ORDER — HYDROMORPHONE HYDROCHLORIDE 2 MG/ML
INJECTION, SOLUTION INTRAMUSCULAR; INTRAVENOUS; SUBCUTANEOUS
Status: COMPLETED
Start: 2021-01-01 | End: 2021-01-01

## 2021-01-01 RX ORDER — HEPARIN SODIUM 1000 [USP'U]/ML
INJECTION, SOLUTION INTRAVENOUS; SUBCUTANEOUS
Status: COMPLETED
Start: 2021-01-01 | End: 2021-01-01

## 2021-01-01 RX ORDER — NITROGLYCERIN 20 MG/100ML
0-100 INJECTION INTRAVENOUS CONTINUOUS
Status: DISCONTINUED | OUTPATIENT
Start: 2021-01-01 | End: 2021-01-01

## 2021-01-01 RX ORDER — HYDROMORPHONE HYDROCHLORIDE 1 MG/ML
.5-2 INJECTION, SOLUTION INTRAMUSCULAR; INTRAVENOUS; SUBCUTANEOUS
Status: DISCONTINUED | OUTPATIENT
Start: 2021-01-01 | End: 2021-02-11 | Stop reason: HOSPADM

## 2021-01-01 RX ORDER — OXYCODONE HYDROCHLORIDE 5 MG/1
5 TABLET ORAL
Status: DISCONTINUED | OUTPATIENT
Start: 2021-01-01 | End: 2021-02-11 | Stop reason: HOSPADM

## 2021-01-01 RX ORDER — LORAZEPAM 2 MG/ML
1 INJECTION INTRAMUSCULAR ONCE
Status: COMPLETED | OUTPATIENT
Start: 2021-01-01 | End: 2021-01-01

## 2021-01-01 RX ORDER — ALBUMIN, HUMAN INJ 5% 5 %
12.5 SOLUTION INTRAVENOUS PRN
Status: DISCONTINUED | OUTPATIENT
Start: 2021-01-01 | End: 2021-01-01

## 2021-01-01 RX ORDER — POLYETHYLENE GLYCOL 3350 17 G/17G
1 POWDER, FOR SOLUTION ORAL
Status: DISCONTINUED | OUTPATIENT
Start: 2021-01-01 | End: 2021-01-01

## 2021-01-01 RX ORDER — MAGNESIUM SULFATE 1 G/100ML
1 INJECTION INTRAVENOUS DAILY
Status: COMPLETED | OUTPATIENT
Start: 2021-01-01 | End: 2021-01-01

## 2021-01-01 RX ORDER — ROCURONIUM BROMIDE 10 MG/ML
0.6 INJECTION, SOLUTION INTRAVENOUS
Status: DISCONTINUED | OUTPATIENT
Start: 2021-01-01 | End: 2021-02-11 | Stop reason: HOSPADM

## 2021-01-01 RX ORDER — HEPARIN SODIUM 200 [USP'U]/100ML
INJECTION, SOLUTION INTRAVENOUS
Status: COMPLETED
Start: 2021-01-01 | End: 2021-01-01

## 2021-01-01 RX ORDER — ACETAMINOPHEN 500 MG
1000 TABLET ORAL EVERY 6 HOURS
Status: DISCONTINUED | OUTPATIENT
Start: 2021-01-01 | End: 2021-01-01

## 2021-01-01 RX ORDER — OXYCODONE HYDROCHLORIDE 5 MG/1
5 TABLET ORAL
Status: DISCONTINUED | OUTPATIENT
Start: 2021-01-01 | End: 2021-01-01

## 2021-01-01 RX ORDER — IPRATROPIUM BROMIDE AND ALBUTEROL SULFATE 2.5; .5 MG/3ML; MG/3ML
3 SOLUTION RESPIRATORY (INHALATION)
Status: DISCONTINUED | OUTPATIENT
Start: 2021-01-01 | End: 2021-02-11 | Stop reason: HOSPADM

## 2021-01-01 RX ORDER — SODIUM CHLORIDE 9 MG/ML
INJECTION, SOLUTION INTRAVENOUS
Status: DISCONTINUED | OUTPATIENT
Start: 2021-01-01 | End: 2021-01-01 | Stop reason: SURG

## 2021-01-01 RX ORDER — DEXTROSE MONOHYDRATE 25 G/50ML
INJECTION, SOLUTION INTRAVENOUS
Status: DISPENSED
Start: 2021-01-01 | End: 2021-01-01

## 2021-01-01 RX ORDER — PROCHLORPERAZINE EDISYLATE 5 MG/ML
10 INJECTION INTRAMUSCULAR; INTRAVENOUS EVERY 6 HOURS PRN
Status: DISCONTINUED | OUTPATIENT
Start: 2021-01-01 | End: 2021-02-11 | Stop reason: HOSPADM

## 2021-01-01 RX ORDER — TRAMADOL HYDROCHLORIDE 50 MG/1
50 TABLET ORAL EVERY 4 HOURS PRN
Status: DISCONTINUED | OUTPATIENT
Start: 2021-01-01 | End: 2021-02-11 | Stop reason: HOSPADM

## 2021-01-01 RX ORDER — MILRINONE LACTATE 1 MG/ML
INJECTION, SOLUTION INTRAVENOUS PRN
Status: DISCONTINUED | OUTPATIENT
Start: 2021-01-01 | End: 2021-01-01 | Stop reason: SURG

## 2021-01-01 RX ORDER — ONDANSETRON 2 MG/ML
8 INJECTION INTRAMUSCULAR; INTRAVENOUS EVERY 6 HOURS PRN
Status: DISCONTINUED | OUTPATIENT
Start: 2021-01-01 | End: 2021-02-11 | Stop reason: HOSPADM

## 2021-01-01 RX ORDER — EPINEPHRINE HCL IN 0.9 % NACL 4MG/250ML
0-10 PLASTIC BAG, INJECTION (ML) INTRAVENOUS CONTINUOUS
Status: DISCONTINUED | OUTPATIENT
Start: 2021-01-01 | End: 2021-01-01

## 2021-01-01 RX ORDER — PROMETHAZINE HYDROCHLORIDE 25 MG/1
25 SUPPOSITORY RECTAL EVERY 6 HOURS PRN
Status: DISCONTINUED | OUTPATIENT
Start: 2021-01-01 | End: 2021-02-11 | Stop reason: HOSPADM

## 2021-01-01 RX ORDER — HEPARIN SODIUM 1000 [USP'U]/ML
80 INJECTION, SOLUTION INTRAVENOUS; SUBCUTANEOUS ONCE
Status: COMPLETED | OUTPATIENT
Start: 2021-01-01 | End: 2021-01-01

## 2021-01-01 RX ORDER — OMEPRAZOLE 20 MG/1
20 CAPSULE, DELAYED RELEASE ORAL DAILY
Status: DISCONTINUED | OUTPATIENT
Start: 2021-01-01 | End: 2021-01-01

## 2021-01-01 RX ORDER — EPINEPHRINE HCL IN 0.9 % NACL 4MG/250ML
0.08 PLASTIC BAG, INJECTION (ML) INTRAVENOUS CONTINUOUS
Status: DISCONTINUED | OUTPATIENT
Start: 2021-01-01 | End: 2021-01-01

## 2021-01-01 RX ORDER — GUAIFENESIN 600 MG/1
600 TABLET, EXTENDED RELEASE ORAL EVERY 12 HOURS
Status: DISCONTINUED | OUTPATIENT
Start: 2021-01-01 | End: 2021-01-01

## 2021-01-01 RX ORDER — LIDOCAINE HYDROCHLORIDE 20 MG/ML
INJECTION, SOLUTION INFILTRATION; PERINEURAL
Status: COMPLETED
Start: 2021-01-01 | End: 2021-01-01

## 2021-01-01 RX ORDER — HEPARIN SODIUM 5000 [USP'U]/ML
5000 INJECTION, SOLUTION INTRAVENOUS; SUBCUTANEOUS EVERY 8 HOURS
Status: DISCONTINUED | OUTPATIENT
Start: 2021-01-01 | End: 2021-01-01

## 2021-01-01 RX ADMIN — HEPARIN SODIUM: 1000 INJECTION, SOLUTION INTRAVENOUS; SUBCUTANEOUS at 17:08

## 2021-01-01 RX ADMIN — HEPARIN SODIUM 5000 UNITS: 5000 INJECTION, SOLUTION INTRAVENOUS; SUBCUTANEOUS at 13:58

## 2021-01-01 RX ADMIN — POTASSIUM CHLORIDE 10 MEQ: 7.46 INJECTION, SOLUTION INTRAVENOUS at 22:55

## 2021-01-01 RX ADMIN — DEXTROSE MONOHYDRATE 50 ML: 25 INJECTION, SOLUTION INTRAVENOUS at 17:47

## 2021-01-01 RX ADMIN — POTASSIUM CHLORIDE 20 MEQ: 14.9 INJECTION, SOLUTION INTRAVENOUS at 12:03

## 2021-01-01 RX ADMIN — VANCOMYCIN HYDROCHLORIDE 1000 MG: 1 INJECTION, POWDER, LYOPHILIZED, FOR SOLUTION INTRAVENOUS at 08:45

## 2021-01-01 RX ADMIN — HEPARIN SODIUM 5000 UNITS: 5000 INJECTION, SOLUTION INTRAVENOUS; SUBCUTANEOUS at 05:38

## 2021-01-01 RX ADMIN — FUROSEMIDE 20 MG: 10 INJECTION, SOLUTION INTRAMUSCULAR; INTRAVENOUS at 01:51

## 2021-01-01 RX ADMIN — EPINEPHRINE 1 MG: 0.1 INJECTION, SOLUTION ENDOTRACHEAL; INTRACARDIAC; INTRAVENOUS at 17:34

## 2021-01-01 RX ADMIN — PROPOFOL 40 MCG/KG/MIN: 10 INJECTION, EMULSION INTRAVENOUS at 15:41

## 2021-01-01 RX ADMIN — DEXMEDETOMIDINE HYDROCHLORIDE 1.5 MCG/KG/HR: 100 INJECTION, SOLUTION INTRAVENOUS at 13:35

## 2021-01-01 RX ADMIN — POTASSIUM CHLORIDE 20 MEQ: 14.9 INJECTION, SOLUTION INTRAVENOUS at 01:52

## 2021-01-01 RX ADMIN — FUROSEMIDE 40 MG: 10 INJECTION, SOLUTION INTRAMUSCULAR; INTRAVENOUS at 06:49

## 2021-01-01 RX ADMIN — FUROSEMIDE 40 MG: 10 INJECTION, SOLUTION INTRAMUSCULAR; INTRAVENOUS at 05:23

## 2021-01-01 RX ADMIN — VANCOMYCIN HYDROCHLORIDE 1 G: 1 INJECTION, POWDER, LYOPHILIZED, FOR SOLUTION INTRAVENOUS at 21:21

## 2021-01-01 RX ADMIN — AMINOCAPROIC ACID 6.69 G: 250 INJECTION, SOLUTION INTRAVENOUS at 08:40

## 2021-01-01 RX ADMIN — FUROSEMIDE 60 MG: 10 INJECTION, SOLUTION INTRAMUSCULAR; INTRAVENOUS at 05:35

## 2021-01-01 RX ADMIN — HEPARIN SODIUM 2000 UNITS: 200 INJECTION, SOLUTION INTRAVENOUS at 17:07

## 2021-01-01 RX ADMIN — MAGNESIUM SULFATE 1 G: 1 INJECTION INTRAVENOUS at 04:51

## 2021-01-01 RX ADMIN — OXYCODONE HYDROCHLORIDE 10 MG: 10 TABLET ORAL at 17:07

## 2021-01-01 RX ADMIN — AMIODARONE HYDROCHLORIDE 0.5 MG/MIN: 1.8 INJECTION, SOLUTION INTRAVENOUS at 07:40

## 2021-01-01 RX ADMIN — POTASSIUM CHLORIDE 10 MEQ: 7.46 INJECTION, SOLUTION INTRAVENOUS at 13:40

## 2021-01-01 RX ADMIN — FENTANYL CITRATE 250 MCG: 50 INJECTION, SOLUTION INTRAMUSCULAR; INTRAVENOUS at 09:18

## 2021-01-01 RX ADMIN — MAGNESIUM SULFATE 1 G: 1 INJECTION INTRAVENOUS at 04:52

## 2021-01-01 RX ADMIN — HEPARIN SODIUM 2700 UNITS: 1000 INJECTION, SOLUTION INTRAVENOUS; SUBCUTANEOUS at 23:04

## 2021-01-01 RX ADMIN — COAGULATION FACTOR VIIA (RECOMBINANT) 1000 MCG: KIT at 12:05

## 2021-01-01 RX ADMIN — AMIODARONE HYDROCHLORIDE 50 MG: 50 INJECTION, SOLUTION INTRAVENOUS at 11:15

## 2021-01-01 RX ADMIN — ACETAMINOPHEN 1000 MG: 500 TABLET ORAL at 22:00

## 2021-01-01 RX ADMIN — LORAZEPAM 1 MG: 2 INJECTION INTRAMUSCULAR; INTRAVENOUS at 13:38

## 2021-01-01 RX ADMIN — AMIODARONE HYDROCHLORIDE 1 MG/MIN: 1.8 INJECTION, SOLUTION INTRAVENOUS at 16:55

## 2021-01-01 RX ADMIN — SODIUM BICARBONATE 50 MEQ: 84 INJECTION, SOLUTION INTRAVENOUS at 19:12

## 2021-01-01 RX ADMIN — HEPARIN SODIUM 18 UNITS/KG/HR: 5000 INJECTION, SOLUTION INTRAVENOUS at 07:08

## 2021-01-01 RX ADMIN — FENTANYL CITRATE 250 MCG: 50 INJECTION, SOLUTION INTRAMUSCULAR; INTRAVENOUS at 07:44

## 2021-01-01 RX ADMIN — FUROSEMIDE 40 MG: 10 INJECTION, SOLUTION INTRAMUSCULAR; INTRAVENOUS at 04:49

## 2021-01-01 RX ADMIN — AMIODARONE HYDROCHLORIDE 0.5 MG/MIN: 1.8 INJECTION, SOLUTION INTRAVENOUS at 05:20

## 2021-01-01 RX ADMIN — EPHEDRINE SULFATE 10 MG: 50 INJECTION INTRAMUSCULAR; INTRAVENOUS; SUBCUTANEOUS at 09:08

## 2021-01-01 RX ADMIN — MILRINONE LACTATE 3.43 MG: 1 INJECTION, SOLUTION INTRAVENOUS at 11:17

## 2021-01-01 RX ADMIN — DEXMEDETOMIDINE HYDROCHLORIDE 1.5 MCG/KG/HR: 100 INJECTION, SOLUTION INTRAVENOUS at 06:44

## 2021-01-01 RX ADMIN — HYDROMORPHONE HYDROCHLORIDE 1 MG: 1 INJECTION, SOLUTION INTRAMUSCULAR; INTRAVENOUS; SUBCUTANEOUS at 13:12

## 2021-01-01 RX ADMIN — SODIUM CHLORIDE, SODIUM GLUCONATE, SODIUM ACETATE, POTASSIUM CHLORIDE AND MAGNESIUM CHLORIDE: 526; 502; 368; 37; 30 INJECTION, SOLUTION INTRAVENOUS at 08:41

## 2021-01-01 RX ADMIN — NOREPINEPHRINE BITARTRATE 2 MCG/MIN: 1 INJECTION, SOLUTION, CONCENTRATE INTRAVENOUS at 11:17

## 2021-01-01 RX ADMIN — PROTAMINE SULFATE 400 MG: 10 INJECTION, SOLUTION INTRAVENOUS at 11:51

## 2021-01-01 RX ADMIN — MAGNESIUM SULFATE 1 G: 1 INJECTION INTRAVENOUS at 14:19

## 2021-01-01 RX ADMIN — EPINEPHRINE 1 MG: 0.1 INJECTION, SOLUTION ENDOTRACHEAL; INTRACARDIAC; INTRAVENOUS at 17:12

## 2021-01-01 RX ADMIN — DOCUSATE SODIUM 50 MG AND SENNOSIDES 8.6 MG 2 TABLET: 8.6; 5 TABLET, FILM COATED ORAL at 17:05

## 2021-01-01 RX ADMIN — ACETAMINOPHEN 650 MG: 325 TABLET, FILM COATED ORAL at 15:33

## 2021-01-01 RX ADMIN — DEXTROSE MONOHYDRATE 0.55 MCG/KG/MIN: 50 INJECTION, SOLUTION INTRAVENOUS at 14:14

## 2021-01-01 RX ADMIN — GUAIFENESIN 200 MG: 100 SOLUTION ORAL at 04:23

## 2021-01-01 RX ADMIN — POTASSIUM CHLORIDE 20 MEQ: 14.9 INJECTION, SOLUTION INTRAVENOUS at 01:53

## 2021-01-01 RX ADMIN — AMIODARONE HYDROCHLORIDE 0.5 MG/MIN: 1.8 INJECTION, SOLUTION INTRAVENOUS at 23:38

## 2021-01-01 RX ADMIN — SODIUM BICARBONATE 100 MEQ: 84 INJECTION, SOLUTION INTRAVENOUS at 18:36

## 2021-01-01 RX ADMIN — ACETAMINOPHEN 650 MG: 325 TABLET, FILM COATED ORAL at 07:40

## 2021-01-01 RX ADMIN — POTASSIUM CHLORIDE 10 MEQ: 7.46 INJECTION, SOLUTION INTRAVENOUS at 19:20

## 2021-01-01 RX ADMIN — TRIMETHOBENZAMIDE HYDROCHLORIDE 200 MG: 100 INJECTION INTRAMUSCULAR at 03:53

## 2021-01-01 RX ADMIN — POTASSIUM CHLORIDE 20 MEQ: 14.9 INJECTION, SOLUTION INTRAVENOUS at 05:43

## 2021-01-01 RX ADMIN — POTASSIUM CHLORIDE 10 MEQ: 7.46 INJECTION, SOLUTION INTRAVENOUS at 14:19

## 2021-01-01 RX ADMIN — POTASSIUM CHLORIDE 10 MEQ: 7.46 INJECTION, SOLUTION INTRAVENOUS at 17:12

## 2021-01-01 RX ADMIN — NOREPINEPHRINE BITARTRATE 30 MCG/MIN: 1 INJECTION INTRAVENOUS at 13:02

## 2021-01-01 RX ADMIN — SODIUM BICARBONATE 50 MEQ: 84 INJECTION, SOLUTION INTRAVENOUS at 18:12

## 2021-01-01 RX ADMIN — SODIUM CHLORIDE: 9 INJECTION, SOLUTION INTRAVENOUS at 13:40

## 2021-01-01 RX ADMIN — EPINEPHRINE 0.08 MCG/KG/MIN: 1 INJECTION INTRAMUSCULAR; INTRAVENOUS; SUBCUTANEOUS at 11:36

## 2021-01-01 RX ADMIN — SUGAMMADEX 400 MG: 100 INJECTION, SOLUTION INTRAVENOUS at 18:55

## 2021-01-01 RX ADMIN — AMIODARONE HYDROCHLORIDE 0.5 MG/MIN: 1.8 INJECTION, SOLUTION INTRAVENOUS at 21:52

## 2021-01-01 RX ADMIN — MAGNESIUM SULFATE IN WATER 2 G: 40 INJECTION, SOLUTION INTRAVENOUS at 09:47

## 2021-01-01 RX ADMIN — AMIODARONE HYDROCHLORIDE 0.5 MG/MIN: 1.8 INJECTION, SOLUTION INTRAVENOUS at 03:18

## 2021-01-01 RX ADMIN — HEPARIN SODIUM 18 UNITS/KG/HR: 5000 INJECTION, SOLUTION INTRAVENOUS at 12:39

## 2021-01-01 RX ADMIN — MIDAZOLAM HYDROCHLORIDE 2 MG: 1 INJECTION, SOLUTION INTRAMUSCULAR; INTRAVENOUS at 20:58

## 2021-01-01 RX ADMIN — FUROSEMIDE 20 MG: 10 INJECTION, SOLUTION INTRAMUSCULAR; INTRAVENOUS at 08:07

## 2021-01-01 RX ADMIN — POTASSIUM CHLORIDE 10 MEQ: 7.46 INJECTION, SOLUTION INTRAVENOUS at 18:49

## 2021-01-01 RX ADMIN — PROPOFOL 100 MG: 10 INJECTION, EMULSION INTRAVENOUS at 07:43

## 2021-01-01 RX ADMIN — POTASSIUM CHLORIDE 10 MEQ: 7.46 INJECTION, SOLUTION INTRAVENOUS at 06:46

## 2021-01-01 RX ADMIN — ROCURONIUM BROMIDE 42 MG: 10 INJECTION, SOLUTION INTRAVENOUS at 16:07

## 2021-01-01 RX ADMIN — ACETAMINOPHEN 1000 MG: 500 TABLET ORAL at 06:49

## 2021-01-01 RX ADMIN — DOCUSATE SODIUM 50 MG AND SENNOSIDES 8.6 MG 2 TABLET: 8.6; 5 TABLET, FILM COATED ORAL at 04:52

## 2021-01-01 RX ADMIN — LORAZEPAM 1 MG: 2 INJECTION INTRAMUSCULAR at 13:38

## 2021-01-01 RX ADMIN — ACETAMINOPHEN 1000 MG: 500 TABLET ORAL at 17:05

## 2021-01-01 RX ADMIN — ROCURONIUM BROMIDE 80 MG: 10 INJECTION, SOLUTION INTRAVENOUS at 06:49

## 2021-01-01 RX ADMIN — HEPARIN SODIUM 18 UNITS/KG/HR: 5000 INJECTION, SOLUTION INTRAVENOUS at 23:38

## 2021-01-01 RX ADMIN — POTASSIUM CHLORIDE 10 MEQ: 7.46 INJECTION, SOLUTION INTRAVENOUS at 00:15

## 2021-01-01 RX ADMIN — HEPARIN SODIUM 5000 UNITS: 5000 INJECTION, SOLUTION INTRAVENOUS; SUBCUTANEOUS at 05:23

## 2021-01-01 RX ADMIN — MAGNESIUM SULFATE IN WATER 2 G: 40 INJECTION, SOLUTION INTRAVENOUS at 10:25

## 2021-01-01 RX ADMIN — OMEPRAZOLE 20 MG: 20 CAPSULE, DELAYED RELEASE ORAL at 04:52

## 2021-01-01 RX ADMIN — DIGOXIN 500 MCG: 0.25 INJECTION INTRAMUSCULAR; INTRAVENOUS at 06:55

## 2021-01-01 RX ADMIN — HEPARIN SODIUM 18 UNITS/KG/HR: 5000 INJECTION, SOLUTION INTRAVENOUS at 09:53

## 2021-01-01 RX ADMIN — HEPARIN SODIUM 18 UNITS/KG/HR: 5000 INJECTION, SOLUTION INTRAVENOUS at 03:19

## 2021-01-01 RX ADMIN — MIDAZOLAM 2 MG: 1 INJECTION INTRAMUSCULAR; INTRAVENOUS at 09:17

## 2021-01-01 RX ADMIN — MUPIROCIN 1 APPLICATION: 20 OINTMENT TOPICAL at 17:05

## 2021-01-01 RX ADMIN — POTASSIUM CHLORIDE 20 MEQ: 14.9 INJECTION, SOLUTION INTRAVENOUS at 10:09

## 2021-01-01 RX ADMIN — HYDROMORPHONE HYDROCHLORIDE 2 MG: 1 INJECTION, SOLUTION INTRAMUSCULAR; INTRAVENOUS; SUBCUTANEOUS at 07:24

## 2021-01-01 RX ADMIN — MUPIROCIN 1 APPLICATION: 20 OINTMENT TOPICAL at 06:00

## 2021-01-01 RX ADMIN — MORPHINE SULFATE 4 MG: 10 INJECTION INTRAVENOUS at 19:00

## 2021-01-01 RX ADMIN — AMIODARONE HYDROCHLORIDE 0.5 MG/MIN: 1.8 INJECTION, SOLUTION INTRAVENOUS at 17:55

## 2021-01-01 RX ADMIN — ONDANSETRON 8 MG: 2 INJECTION INTRAMUSCULAR; INTRAVENOUS at 12:46

## 2021-01-01 RX ADMIN — HEPARIN SODIUM 20 UNITS/KG/HR: 5000 INJECTION, SOLUTION INTRAVENOUS at 07:39

## 2021-01-01 RX ADMIN — FUROSEMIDE 20 MG: 10 INJECTION, SOLUTION INTRAMUSCULAR; INTRAVENOUS at 04:23

## 2021-01-01 RX ADMIN — AMINOCAPROIC ACID 1 G/HR: 250 INJECTION, SOLUTION INTRAVENOUS at 10:04

## 2021-01-01 RX ADMIN — HEPARIN SODIUM 5000 UNITS: 5000 INJECTION, SOLUTION INTRAVENOUS; SUBCUTANEOUS at 20:38

## 2021-01-01 RX ADMIN — FUROSEMIDE 60 MG: 10 INJECTION, SOLUTION INTRAVENOUS at 06:18

## 2021-01-01 RX ADMIN — POTASSIUM CHLORIDE 10 MEQ: 10 INJECTION, SOLUTION INTRAVENOUS at 05:30

## 2021-01-01 RX ADMIN — PROPOFOL 40 MCG/KG/MIN: 10 INJECTION, EMULSION INTRAVENOUS at 21:18

## 2021-01-01 RX ADMIN — SODIUM CHLORIDE: 9 INJECTION, SOLUTION INTRAVENOUS at 08:41

## 2021-01-01 RX ADMIN — ONDANSETRON 4 MG: 2 INJECTION INTRAMUSCULAR; INTRAVENOUS at 03:18

## 2021-01-01 RX ADMIN — POTASSIUM CHLORIDE 10 MEQ: 7.46 INJECTION, SOLUTION INTRAVENOUS at 12:14

## 2021-01-01 RX ADMIN — EPINEPHRINE 0.04 MCG/KG/MIN: 1 INJECTION INTRAMUSCULAR; INTRAVENOUS; SUBCUTANEOUS at 08:56

## 2021-01-01 RX ADMIN — INSULIN HUMAN 2 UNITS: 100 INJECTION, SOLUTION PARENTERAL at 15:17

## 2021-01-01 RX ADMIN — POTASSIUM CHLORIDE 10 MEQ: 7.46 INJECTION, SOLUTION INTRAVENOUS at 00:50

## 2021-01-01 RX ADMIN — FUROSEMIDE 5 MG/HR: 10 INJECTION, SOLUTION INTRAMUSCULAR; INTRAVENOUS at 09:22

## 2021-01-01 RX ADMIN — HEPARIN SODIUM 287.5 UNITS/HR: 1000 INJECTION, SOLUTION INTRAVENOUS; SUBCUTANEOUS at 17:49

## 2021-01-01 RX ADMIN — DEXTROSE MONOHYDRATE 50 ML: 25 INJECTION, SOLUTION INTRAVENOUS at 18:28

## 2021-01-01 RX ADMIN — AMIODARONE HYDROCHLORIDE 0.5 MG/MIN: 1.8 INJECTION, SOLUTION INTRAVENOUS at 06:36

## 2021-01-01 RX ADMIN — DEXTROSE MONOHYDRATE 50 ML: 25 INJECTION, SOLUTION INTRAVENOUS at 01:01

## 2021-01-01 RX ADMIN — EPINEPHRINE 0.5 MG: 0.1 INJECTION, SOLUTION ENDOTRACHEAL; INTRACARDIAC; INTRAVENOUS at 17:51

## 2021-01-01 RX ADMIN — SODIUM CHLORIDE, SODIUM GLUCONATE, SODIUM ACETATE, POTASSIUM CHLORIDE AND MAGNESIUM CHLORIDE 1000 ML: 526; 502; 368; 37; 30 INJECTION, SOLUTION INTRAVENOUS at 14:00

## 2021-01-01 RX ADMIN — VASOPRESSIN 0.03 UNITS/MIN: 20 INJECTION INTRAVENOUS at 21:34

## 2021-01-01 RX ADMIN — POTASSIUM CHLORIDE 20 MEQ: 14.9 INJECTION, SOLUTION INTRAVENOUS at 14:55

## 2021-01-01 RX ADMIN — ROCURONIUM BROMIDE 50 MG: 10 INJECTION, SOLUTION INTRAVENOUS at 11:56

## 2021-01-01 RX ADMIN — Medication 100 MEQ: at 18:36

## 2021-01-01 RX ADMIN — POTASSIUM CHLORIDE 10 MEQ: 7.46 INJECTION, SOLUTION INTRAVENOUS at 00:55

## 2021-01-01 RX ADMIN — VANCOMYCIN HYDROCHLORIDE 1500 MG: 500 INJECTION, POWDER, LYOPHILIZED, FOR SOLUTION INTRAVENOUS at 01:49

## 2021-01-01 RX ADMIN — VASOPRESSIN 2 UNITS: 20 INJECTION INTRAVENOUS at 11:32

## 2021-01-01 RX ADMIN — ROCURONIUM BROMIDE 50 MG: 10 INJECTION, SOLUTION INTRAVENOUS at 09:17

## 2021-01-01 RX ADMIN — EPINEPHRINE 1 MG: 0.1 INJECTION, SOLUTION ENDOTRACHEAL; INTRACARDIAC; INTRAVENOUS at 17:40

## 2021-01-01 RX ADMIN — WATER 0.05 MCG/KG/MIN: 1 SOLUTION INTRAVENOUS at 10:56

## 2021-01-01 RX ADMIN — POTASSIUM CHLORIDE 20 MEQ: 14.9 INJECTION, SOLUTION INTRAVENOUS at 17:22

## 2021-01-01 RX ADMIN — SODIUM CHLORIDE 1 UNITS/HR: 9 INJECTION, SOLUTION INTRAVENOUS at 15:00

## 2021-01-01 RX ADMIN — AMIODARONE HYDROCHLORIDE 0.5 MG/MIN: 1.8 INJECTION, SOLUTION INTRAVENOUS at 12:51

## 2021-01-01 RX ADMIN — PHENYLEPHRINE HYDROCHLORIDE 100 MCG: 10 INJECTION INTRAVENOUS at 09:09

## 2021-01-01 RX ADMIN — ACETAMINOPHEN 650 MG: 325 TABLET, FILM COATED ORAL at 00:00

## 2021-01-01 RX ADMIN — MUPIROCIN 1 APPLICATION: 20 OINTMENT TOPICAL at 04:54

## 2021-01-01 RX ADMIN — FUROSEMIDE 40 MG: 10 INJECTION, SOLUTION INTRAMUSCULAR; INTRAVENOUS at 16:16

## 2021-01-01 RX ADMIN — DEXMEDETOMIDINE HYDROCHLORIDE 1.5 MCG/KG/HR: 100 INJECTION, SOLUTION INTRAVENOUS at 09:22

## 2021-01-01 RX ADMIN — FUROSEMIDE 40 MG: 10 INJECTION, SOLUTION INTRAMUSCULAR; INTRAVENOUS at 20:38

## 2021-01-01 RX ADMIN — POTASSIUM CHLORIDE 10 MEQ: 7.46 INJECTION, SOLUTION INTRAVENOUS at 06:38

## 2021-01-01 RX ADMIN — MORPHINE SULFATE 4 MG: 10 INJECTION INTRAVENOUS at 20:58

## 2021-01-01 RX ADMIN — PHYTONADIONE 10 MG: 5 TABLET ORAL at 12:38

## 2021-01-01 RX ADMIN — EPHEDRINE SULFATE 10 MG: 50 INJECTION INTRAMUSCULAR; INTRAVENOUS; SUBCUTANEOUS at 08:43

## 2021-01-01 RX ADMIN — PHENYLEPHRINE HYDROCHLORIDE 100 MCG: 10 INJECTION INTRAVENOUS at 09:44

## 2021-01-01 RX ADMIN — FENTANYL CITRATE 500 MCG: 50 INJECTION, SOLUTION INTRAMUSCULAR; INTRAVENOUS at 08:35

## 2021-01-01 RX ADMIN — HYDROMORPHONE HYDROCHLORIDE 2 MG: 1 INJECTION, SOLUTION INTRAMUSCULAR; INTRAVENOUS; SUBCUTANEOUS at 14:10

## 2021-01-01 RX ADMIN — TRAMADOL HYDROCHLORIDE 50 MG: 50 TABLET, FILM COATED ORAL at 04:52

## 2021-01-01 RX ADMIN — PIPERACILLIN AND TAZOBACTAM 4.5 G: 4; .5 INJECTION, POWDER, LYOPHILIZED, FOR SOLUTION INTRAVENOUS; PARENTERAL at 01:33

## 2021-01-01 RX ADMIN — HYDROMORPHONE HYDROCHLORIDE 2 MG: 2 INJECTION, SOLUTION INTRAMUSCULAR; INTRAVENOUS; SUBCUTANEOUS at 07:10

## 2021-01-01 RX ADMIN — BUTALBITAL, ACETAMINOPHEN, AND CAFFEINE 1 TABLET: 50; 325; 40 TABLET ORAL at 12:18

## 2021-01-01 RX ADMIN — HYDROMORPHONE HYDROCHLORIDE 1.5 MG: 10 INJECTION, SOLUTION INTRAMUSCULAR; INTRAVENOUS; SUBCUTANEOUS at 15:41

## 2021-01-01 RX ADMIN — SODIUM CHLORIDE, POTASSIUM CHLORIDE, SODIUM LACTATE AND CALCIUM CHLORIDE 1905 ML: 600; 310; 30; 20 INJECTION, SOLUTION INTRAVENOUS at 01:18

## 2021-01-01 RX ADMIN — EPINEPHRINE 0.1 MCG/KG/MIN: 1 INJECTION INTRAMUSCULAR; INTRAVENOUS; SUBCUTANEOUS at 16:02

## 2021-01-01 RX ADMIN — ROCURONIUM BROMIDE 50 MG: 10 INJECTION, SOLUTION INTRAVENOUS at 07:44

## 2021-01-01 RX ADMIN — TRAMADOL HYDROCHLORIDE 50 MG: 50 TABLET, FILM COATED ORAL at 19:22

## 2021-01-01 RX ADMIN — DEXTROSE MONOHYDRATE 50 ML: 25 INJECTION, SOLUTION INTRAVENOUS at 20:15

## 2021-01-01 RX ADMIN — POTASSIUM CHLORIDE 20 MEQ: 14.9 INJECTION, SOLUTION INTRAVENOUS at 20:55

## 2021-01-01 RX ADMIN — AMIODARONE HYDROCHLORIDE 0.5 MG/MIN: 1.8 INJECTION, SOLUTION INTRAVENOUS at 19:20

## 2021-01-01 RX ADMIN — POTASSIUM CHLORIDE 20 MEQ: 14.9 INJECTION, SOLUTION INTRAVENOUS at 17:58

## 2021-01-01 RX ADMIN — DEXTROSE MONOHYDRATE 0.38 MCG/KG/MIN: 50 INJECTION, SOLUTION INTRAVENOUS at 00:21

## 2021-01-01 RX ADMIN — AMIODARONE HYDROCHLORIDE 0.5 MG/MIN: 1.8 INJECTION, SOLUTION INTRAVENOUS at 10:42

## 2021-01-01 RX ADMIN — LIDOCAINE HYDROCHLORIDE: 20 INJECTION, SOLUTION INFILTRATION; PERINEURAL at 17:07

## 2021-01-01 RX ADMIN — HEPARIN SODIUM: 1000 INJECTION, SOLUTION INTRAVENOUS; SUBCUTANEOUS at 17:07

## 2021-01-01 RX ADMIN — FUROSEMIDE 40 MG: 10 INJECTION, SOLUTION INTRAMUSCULAR; INTRAVENOUS at 12:13

## 2021-01-01 RX ADMIN — NOREPINEPHRINE BITARTRATE 7 MCG/MIN: 1 INJECTION INTRAVENOUS at 02:23

## 2021-01-01 RX ADMIN — VASOPRESSIN 0.03 UNITS/MIN: 20 INJECTION INTRAVENOUS at 14:14

## 2021-01-01 RX ADMIN — SODIUM BICARBONATE 100 MEQ: 84 INJECTION, SOLUTION INTRAVENOUS at 18:27

## 2021-01-01 RX ADMIN — VECURONIUM BROMIDE 10 MG: 10 INJECTION, POWDER, LYOPHILIZED, FOR SOLUTION INTRAVENOUS at 08:35

## 2021-01-01 RX ADMIN — FUROSEMIDE 60 MG: 10 INJECTION, SOLUTION INTRAVENOUS at 14:04

## 2021-01-01 RX ADMIN — ETOMIDATE 20 MG: 2 INJECTION INTRAVENOUS at 06:48

## 2021-01-01 RX ADMIN — HEPARIN SODIUM 5300 UNITS: 1000 INJECTION, SOLUTION INTRAVENOUS; SUBCUTANEOUS at 09:52

## 2021-01-01 RX ADMIN — SODIUM CHLORIDE, POTASSIUM CHLORIDE, SODIUM LACTATE AND CALCIUM CHLORIDE: 600; 310; 30; 20 INJECTION, SOLUTION INTRAVENOUS at 08:41

## 2021-01-01 RX ADMIN — FUROSEMIDE 60 MG: 10 INJECTION, SOLUTION INTRAVENOUS at 20:14

## 2021-01-01 RX ADMIN — ACETAMINOPHEN 1000 MG: 500 TABLET ORAL at 13:56

## 2021-01-01 RX ADMIN — HEPARIN SODIUM 25000 UNITS: 1000 INJECTION, SOLUTION INTRAVENOUS; SUBCUTANEOUS at 08:39

## 2021-01-01 SDOH — HEALTH STABILITY: MENTAL HEALTH: HOW OFTEN DO YOU HAVE A DRINK CONTAINING ALCOHOL?: 2-3 TIMES A WEEK

## 2021-01-01 SDOH — HEALTH STABILITY: MENTAL HEALTH: HOW MANY STANDARD DRINKS CONTAINING ALCOHOL DO YOU HAVE ON A TYPICAL DAY?: 1 OR 2

## 2021-01-01 ASSESSMENT — COPD QUESTIONNAIRES
DURING THE PAST 4 WEEKS HOW MUCH DID YOU FEEL SHORT OF BREATH: SOME OF THE TIME
COPD SCREENING SCORE: 2
HAVE YOU SMOKED AT LEAST 100 CIGARETTES IN YOUR ENTIRE LIFE: NO/DON'T KNOW
COPD SCREENING SCORE: 2
HAVE YOU SMOKED AT LEAST 100 CIGARETTES IN YOUR ENTIRE LIFE: NO/DON'T KNOW
DURING THE PAST 4 WEEKS HOW MUCH DID YOU FEEL SHORT OF BREATH: SOME OF THE TIME
DO YOU EVER COUGH UP ANY MUCUS OR PHLEGM?: NO/ONLY WITH OCCASIONAL COLDS OR INFECTIONS
DO YOU EVER COUGH UP ANY MUCUS OR PHLEGM?: NO/ONLY WITH OCCASIONAL COLDS OR INFECTIONS

## 2021-01-01 ASSESSMENT — ENCOUNTER SYMPTOMS
CHEST TIGHTNESS: 0
SHORTNESS OF BREATH: 1
CHILLS: 0
MEMORY LOSS: 0
LIGHT-HEADEDNESS: 0
FATIGUE: 1
DIZZINESS: 0
VOMITING: 0
ABDOMINAL PAIN: 0
SHORTNESS OF BREATH: 1
SHORTNESS OF BREATH: 1
BRUISES/BLEEDS EASILY: 0
MUSCULOSKELETAL NEGATIVE: 1
RESPIRATORY NEGATIVE: 1
WEAKNESS: 0
WEAKNESS: 1
DIZZINESS: 0
PALPITATIONS: 0
PND: 0
WEIGHT LOSS: 0
DIZZINESS: 0
EYES NEGATIVE: 1
BLOOD IN STOOL: 0
NEUROLOGICAL NEGATIVE: 1
CHEST TIGHTNESS: 0
COUGH: 1
PALPITATIONS: 0
FEVER: 0
SHORTNESS OF BREATH: 1
LIGHT-HEADEDNESS: 0
SHORTNESS OF BREATH: 0
FEVER: 0
CHILLS: 0
CHEST TIGHTNESS: 0
FEVER: 0
WEAKNESS: 0
FEVER: 0
COUGH: 1
CHILLS: 0
PALPITATIONS: 0
HEADACHES: 0
FATIGUE: 1
PALPITATIONS: 0
GASTROINTESTINAL NEGATIVE: 1
COUGH: 1
LOSS OF CONSCIOUSNESS: 1
HALLUCINATIONS: 0
CHEST TIGHTNESS: 0
SHORTNESS OF BREATH: 1
MUSCULOSKELETAL NEGATIVE: 1
ABDOMINAL PAIN: 0
VOMITING: 0
NAUSEA: 1
CLAUDICATION: 0
DIZZINESS: 0
SEIZURES: 0
NAUSEA: 0
DOUBLE VISION: 0
COUGH: 1
GASTROINTESTINAL NEGATIVE: 1
RESPIRATORY NEGATIVE: 1
FATIGUE: 1
PSYCHIATRIC NEGATIVE: 1
COUGH: 1
PALPITATIONS: 0
SHORTNESS OF BREATH: 1
DIZZINESS: 0
ABDOMINAL PAIN: 0
WEAKNESS: 1
WEAKNESS: 1
FATIGUE: 1
SHORTNESS OF BREATH: 1
PALPITATIONS: 0
FEVER: 0
PND: 0
ABDOMINAL PAIN: 0
PALPITATIONS: 0
VOMITING: 0
LIGHT-HEADEDNESS: 0
WEAKNESS: 1
SHORTNESS OF BREATH: 0
SPUTUM PRODUCTION: 0
ORTHOPNEA: 0
FOCAL WEAKNESS: 0
PSYCHIATRIC NEGATIVE: 1
EYE PAIN: 0
WEIGHT LOSS: 0
HEMOPTYSIS: 0
EYES NEGATIVE: 1
MUSCULOSKELETAL NEGATIVE: 1
NAUSEA: 0
CLAUDICATION: 0
CHILLS: 0
PHOTOPHOBIA: 0
MUSCULOSKELETAL NEGATIVE: 1
COUGH: 0
ORTHOPNEA: 0
NEUROLOGICAL NEGATIVE: 1
EYES NEGATIVE: 1
SPEECH CHANGE: 0
DIZZINESS: 0
LIGHT-HEADEDNESS: 0
NAUSEA: 0
HEADACHES: 0
ORTHOPNEA: 0
LIGHT-HEADEDNESS: 0
TREMORS: 0
EYES NEGATIVE: 1
WEIGHT LOSS: 0
MUSCULOSKELETAL NEGATIVE: 1
NAUSEA: 1
CHEST TIGHTNESS: 0
POLYDIPSIA: 0
VOMITING: 0
SHORTNESS OF BREATH: 0
SHORTNESS OF BREATH: 0
DIZZINESS: 0
EYES NEGATIVE: 1
WEAKNESS: 1
ABDOMINAL PAIN: 0
EYES NEGATIVE: 1
PND: 0
MUSCULOSKELETAL NEGATIVE: 1
FATIGUE: 1

## 2021-01-01 ASSESSMENT — FIBROSIS 4 INDEX
FIB4 SCORE: 1.61
FIB4 SCORE: 10.3
FIB4 SCORE: 2.832352771499733615
FIB4 SCORE: 0.87
FIB4 SCORE: 0.87
FIB4 SCORE: 1.67
FIB4 SCORE: 0.87
FIB4 SCORE: 0.87
FIB4 SCORE: 1.38

## 2021-01-01 ASSESSMENT — COGNITIVE AND FUNCTIONAL STATUS - GENERAL
SUGGESTED CMS G CODE MODIFIER DAILY ACTIVITY: CK
CLIMB 3 TO 5 STEPS WITH RAILING: A LOT
WALKING IN HOSPITAL ROOM: A LITTLE
HELP NEEDED FOR BATHING: A LITTLE
DRESSING REGULAR UPPER BODY CLOTHING: A LITTLE
MOBILITY SCORE: 20
SUGGESTED CMS G CODE MODIFIER DAILY ACTIVITY: CK
HELP NEEDED FOR BATHING: A LITTLE
STANDING UP FROM CHAIR USING ARMS: A LITTLE
DRESSING REGULAR UPPER BODY CLOTHING: A LITTLE
DRESSING REGULAR LOWER BODY CLOTHING: A LITTLE
DRESSING REGULAR LOWER BODY CLOTHING: A LITTLE
TOILETING: A LITTLE
SUGGESTED CMS G CODE MODIFIER MOBILITY: CJ
MOBILITY SCORE: 21
PERSONAL GROOMING: A LITTLE
CLIMB 3 TO 5 STEPS WITH RAILING: A LOT
SUGGESTED CMS G CODE MODIFIER MOBILITY: CJ
PERSONAL GROOMING: A LITTLE
DAILY ACTIVITIY SCORE: 19
WALKING IN HOSPITAL ROOM: A LITTLE
TOILETING: A LITTLE
DAILY ACTIVITIY SCORE: 19

## 2021-01-01 ASSESSMENT — PAIN DESCRIPTION - PAIN TYPE
TYPE: ACUTE PAIN
TYPE: SURGICAL PAIN
TYPE: ACUTE PAIN
TYPE: SURGICAL PAIN
TYPE: ACUTE PAIN
TYPE: SURGICAL PAIN
TYPE: ACUTE PAIN

## 2021-01-01 ASSESSMENT — PATIENT HEALTH QUESTIONNAIRE - PHQ9
SUM OF ALL RESPONSES TO PHQ9 QUESTIONS 1 AND 2: 0
1. LITTLE INTEREST OR PLEASURE IN DOING THINGS: NOT AT ALL
1. LITTLE INTEREST OR PLEASURE IN DOING THINGS: NOT AT ALL
SUM OF ALL RESPONSES TO PHQ9 QUESTIONS 1 AND 2: 0
1. LITTLE INTEREST OR PLEASURE IN DOING THINGS: NOT AT ALL
2. FEELING DOWN, DEPRESSED, IRRITABLE, OR HOPELESS: NOT AT ALL
2. FEELING DOWN, DEPRESSED, IRRITABLE, OR HOPELESS: NOT AT ALL
SUM OF ALL RESPONSES TO PHQ9 QUESTIONS 1 AND 2: 0
2. FEELING DOWN, DEPRESSED, IRRITABLE, OR HOPELESS: NOT AT ALL

## 2021-01-01 ASSESSMENT — LIFESTYLE VARIABLES
EVER HAD A DRINK FIRST THING IN THE MORNING TO STEADY YOUR NERVES TO GET RID OF A HANGOVER: NO
ALCOHOL_USE: NO
CONSUMPTION TOTAL: NEGATIVE
ON A TYPICAL DAY WHEN YOU DRINK ALCOHOL HOW MANY DRINKS DO YOU HAVE: 0
DOES PATIENT WANT TO STOP DRINKING: NO
DOES PATIENT WANT TO STOP DRINKING: NO
HAVE PEOPLE ANNOYED YOU BY CRITICIZING YOUR DRINKING: NO
DO YOU DRINK ALCOHOL: NO
TOTAL SCORE: 0
EVER FELT BAD OR GUILTY ABOUT YOUR DRINKING: NO
HOW MANY TIMES IN THE PAST YEAR HAVE YOU HAD 5 OR MORE DRINKS IN A DAY: 0
TOTAL SCORE: 0
HAVE YOU EVER FELT YOU SHOULD CUT DOWN ON YOUR DRINKING: NO
TOTAL SCORE: 0
AVERAGE NUMBER OF DAYS PER WEEK YOU HAVE A DRINK CONTAINING ALCOHOL: 0

## 2021-01-01 ASSESSMENT — PAIN SCALES - GENERAL: PAIN_LEVEL: 0

## 2021-01-04 PROBLEM — I27.20 PULMONARY HYPERTENSION (HCC): Status: ACTIVE | Noted: 2021-01-01

## 2021-01-08 NOTE — PROGRESS NOTES
REFERRING PHYSICIAN: Velma Briceño MD.     CONSULTING PHYSICIAN: Alphonse Webber MD, FACS     CHIEF COMPLAINT: ***    HISTORY OF PRESENT ILLNESS: The patient is a 40 y.o. female ***  Today she states she***  has an exercise capacity of ***. She denies shortness of breath, chest pain, lower extremity edema, dizziness, syncope, orthopnea, or PND.    Admitted 1214 for shortness of breath due to Covid, complained of chest pain and had cardiac work-up with echo showing mitral stenosis and pulmonary hypertension.  Recently moved from [facility] has no health insurance.    PAST MEDICAL HISTORY:   Active Ambulatory Problems     Diagnosis Date Noted   • COVID-19 virus detected 12/14/2020   • Mitral stenosis 12/15/2020   • Pulmonary hypertension (HCC) 01/04/2021     Resolved Ambulatory Problems     Diagnosis Date Noted   • Pleuritic chest pain 12/14/2020     No Additional Past Medical History       PAST SURGICAL HISTORY: No past surgical history on file.     ALLERGIES: No Known Allergies     CURRENT MEDICATIONS:   Current Outpatient Medications:   •  potassium chloride SA (K-DUR) 10 MEQ Tab CR, Take 1 Tab by mouth 2 times a day., Disp: 60 Tab, Rfl: 0  •  furosemide (LASIX) 20 MG Tab, Take 2 Tabs by mouth 2 times a day., Disp: 60 Tab, Rfl: 0  •  metoprolol (LOPRESSOR) 25 MG Tab, Take 0.5 Tabs by mouth 2 times a day., Disp: 45 Tab, Rfl: 0  •  aspirin 81 MG EC tablet, Take 1 Tab by mouth every day for 30 days., Disp: 30 Tab, Rfl: 1    FAMILY HISTORY: No family history on file.     SOCIAL HISTORY:   Social History     Socioeconomic History   • Marital status: Unknown     Spouse name: Not on file   • Number of children: Not on file   • Years of education: Not on file   • Highest education level: Not on file   Occupational History   • Not on file   Social Needs   • Financial resource strain: Somewhat hard   • Food insecurity     Worry: Sometimes true     Inability: Sometimes true   • Transportation needs     Medical: No      Non-medical: No   Tobacco Use   • Smoking status: Never Smoker   • Smokeless tobacco: Current User   • Tobacco comment: chews tobacco   Substance and Sexual Activity   • Alcohol use: Not Currently   • Drug use: Not Currently   • Sexual activity: Not on file   Lifestyle   • Physical activity     Days per week: Not on file     Minutes per session: Not on file   • Stress: Not on file   Relationships   • Social connections     Talks on phone: Not on file     Gets together: Not on file     Attends Sabianist service: Not on file     Active member of club or organization: Not on file     Attends meetings of clubs or organizations: Not on file     Relationship status: Not on file   • Intimate partner violence     Fear of current or ex partner: Not on file     Emotionally abused: Not on file     Physically abused: Not on file     Forced sexual activity: Not on file   Other Topics Concern   • Not on file   Social History Narrative   • Not on file       REVIEW OF SYSTEMS:  ROS      PHYSICAL EXAMINATION:    Physical Exam  There were no vitals taken for this visit.     LABS REVIEWED:  Lab Results   Component Value Date/Time    SODIUM 132 (L) 12/16/2020 05:20 AM    POTASSIUM 4.0 12/16/2020 05:20 AM    CHLORIDE 100 12/16/2020 05:20 AM    CO2 25 12/16/2020 05:20 AM    GLUCOSE 85 12/16/2020 05:20 AM    BUN 15 12/16/2020 05:20 AM    CREATININE 0.98 12/16/2020 05:20 AM      Lab Results   Component Value Date/Time    PROTHROMBTM 17.2 (H) 12/16/2020 05:20 AM    INR 1.36 (H) 12/16/2020 05:20 AM      Lab Results   Component Value Date/Time    WBC 6.5 12/16/2020 05:20 AM    RBC 3.96 (L) 12/16/2020 05:20 AM    HEMOGLOBIN 11.2 (L) 12/16/2020 05:20 AM    HEMATOCRIT 36.1 (L) 12/16/2020 05:20 AM    MCV 91.2 12/16/2020 05:20 AM    MCH 28.3 12/16/2020 05:20 AM    MCHC 31.0 (L) 12/16/2020 05:20 AM    MPV 10.3 12/16/2020 05:20 AM    NEUTSPOLYS 89.60 (H) 12/14/2020 03:57 PM    LYMPHOCYTES 5.20 (L) 12/14/2020 03:57 PM    MONOCYTES 2.60 12/14/2020  03:57 PM    EOSINOPHILS 1.70 12/14/2020 03:57 PM    BASOPHILS 0.90 12/14/2020 03:57 PM    ANISOCYTOSIS 2+ (A) 12/14/2020 03:57 PM        IMAGING REVIEWED AND INTERPRETED:    ECHOCARDIOGRAM [facility] 12/15/2020:   Left ventricular ejection fraction is visually estimated to be 50%.  Severely dilated right ventricle. Reduced right ventricular systolic function.  Heavily calcified mitral valve with severe mitral stenosis.  Mean transvalvular gradient is 20 mmHg at a heart rate of 102 BPM.  Severe tricuspid regurgitation.  Estimated right ventricular systolic pressure is 105 mmHg.  Right heart pressures are consistent with severe pulmonary hypertension.    ANGIOGRAM: none      IMPRESSION:  ***      PLAN:  I recommend ***  The procedure, its risks, benefits, potential complications and alternative treatments were discussed with the patient in detail including the risks should she decide not to undergo my recommended treatment. All of her questions were answered to her satisfaction and she is willing to proceed with the operation. The risks include death, stroke, infection: to include a rare bacterial infection related to the use of the heart/lung machine, ralph-operative myocardial infarction, dysrhythmias, diaphragmatic paralysis, chest wall paresthesia, tracheostomy, kidney or other organ failure, possible return to the operating room for bleeding, bleeding requiring transfusion with its attendant risks including AIDS or hepatitis, dehiscence of surgical incisions, respiratory complications including the need for prolonged ventilator support, Protamine or other drug reaction, peripheral neuropathy, loss of limb, and miscount of surgical items. The operative mortality risk is approximately ***%. The STS mortality risk score is ***% and the morbidity and mortality risk score is ***%. The scores were discussed with patient.    Findings and recommendations have been discussed with the patient’s cardiologist, Velma Briceño MD.   Thank you for this very challenging consultation and participation in the patient’s care.  I will keep you apprised of all future developments.      The operation,*** is scheduled for*** at *** AM at ***  [facility].         Sincerely,     Alphonse Webber MD, FACS

## 2021-01-12 NOTE — PROGRESS NOTES
Chief Complaint   Patient presents with   • Pulmonary Hypertension       Subjective:   Carolyn Hauser is a 40 y.o. female patient of Dr. Velma Briceño who presents today for follow up.     Patient was last seen by myself on 1/4/2021 for hospital follow up. Patient was admitted on 12/14/2020 with dyspnea and chest discomfort. She was found to be positivie for Covid-19 as well to have severe mitral stenosis and pulmonary hypertension. It was felt patient would most likely need a percutaneous mitral balloon valvuloplasty as an outpatient once full recovered from her Covid-19 infection. She was discharged on 12/16/2020 on ASA, lasix and metoprolol.     During her last appointment patient appeared to be in acute heart failure at her last appointment, hospitalization was discussed due to her volume overload in setting of hypotension, patient refused. Patients pulse was elevated, she had not taken her metoprolol in many days as she ran out and did not have refills. Her lasix was increased to 20 mg BID and her Metoprolol was refilled at 12.5 mg BID. She was continued on daily ASA 81 mg.     Today patient states she is feeling significantly better. Primarily feels less short of breath. Still has edema but is better. She is not sure how her home weights have been as she does not have a scale at home. Reports that she has an appointment with CTS on 1/20/21. Denies having any significant concerns or complaints to discuss today.     History reviewed. No pertinent past medical history.  History reviewed. No pertinent surgical history.  History reviewed. No pertinent family history.  Social History     Socioeconomic History   • Marital status: Unknown     Spouse name: Not on file   • Number of children: Not on file   • Years of education: Not on file   • Highest education level: Not on file   Occupational History   • Not on file   Social Needs   • Financial resource strain: Somewhat hard   • Food insecurity     Worry: Sometimes  true     Inability: Sometimes true   • Transportation needs     Medical: No     Non-medical: No   Tobacco Use   • Smoking status: Never Smoker   • Smokeless tobacco: Current User   • Tobacco comment: chews tobacco   Substance and Sexual Activity   • Alcohol use: Not Currently   • Drug use: Not Currently   • Sexual activity: Not on file   Lifestyle   • Physical activity     Days per week: Not on file     Minutes per session: Not on file   • Stress: Not on file   Relationships   • Social connections     Talks on phone: Not on file     Gets together: Not on file     Attends Lutheran service: Not on file     Active member of club or organization: Not on file     Attends meetings of clubs or organizations: Not on file     Relationship status: Not on file   • Intimate partner violence     Fear of current or ex partner: Not on file     Emotionally abused: Not on file     Physically abused: Not on file     Forced sexual activity: Not on file   Other Topics Concern   • Not on file   Social History Narrative   • Not on file     No Known Allergies  Outpatient Encounter Medications as of 1/12/2021   Medication Sig Dispense Refill   • metoprolol (LOPRESSOR) 25 MG Tab Take 0.5 Tabs by mouth 2 times a day. 45 Tab 0   • furosemide (LASIX) 40 MG Tab Take 1 Tab by mouth 2 times a day. 180 Tab 0   • potassium chloride SA (K-DUR) 10 MEQ Tab CR Take 1 Tab by mouth 2 times a day. 180 Tab 0   • aspirin 81 MG EC tablet Take 1 Tab by mouth every day for 30 days. 30 Tab 1   • [DISCONTINUED] potassium chloride SA (K-DUR) 10 MEQ Tab CR Take 1 Tab by mouth 2 times a day. 60 Tab 0   • [DISCONTINUED] furosemide (LASIX) 20 MG Tab Take 2 Tabs by mouth 2 times a day. 60 Tab 0   • [DISCONTINUED] potassium chloride SA (K-DUR) 10 MEQ Tab CR Take 1 Tab by mouth 2 times a day. 60 Tab 0   • [DISCONTINUED] furosemide (LASIX) 20 MG Tab Take 2 Tabs by mouth 2 times a day. 60 Tab 0   • [DISCONTINUED] metoprolol (LOPRESSOR) 25 MG Tab Take 0.5 Tabs by mouth 2  "times a day. 45 Tab 0     No facility-administered encounter medications on file as of 1/12/2021.      Review of Systems   Constitutional: Negative for malaise/fatigue and weight loss.   Respiratory: Positive for shortness of breath (Signficantly improved).    Cardiovascular: Positive for leg swelling (Improved). Negative for chest pain, palpitations, orthopnea, claudication and PND.   Neurological: Negative for dizziness and weakness.   All other systems reviewed and are negative.       Objective:   /62 (BP Location: Left arm, Patient Position: Sitting, BP Cuff Size: Adult)   Pulse 86   Resp 14   Ht 1.626 m (5' 4\")   Wt 62.8 kg (138 lb 6.4 oz)   SpO2 100%   BMI 23.76 kg/m²     Physical Exam   Constitutional: She is oriented to person, place, and time. She appears well-developed and well-nourished. No distress.   HENT:   Head: Normocephalic.   Eyes: EOM are normal.   Neck: JVD (Improved) present.   Cardiovascular: Normal rate and regular rhythm.   Murmur heard.   Systolic murmur is present with a grade of 3/6.  Pulmonary/Chest: Breath sounds normal. No respiratory distress.   Abdominal: Soft. There is no abdominal tenderness.   Musculoskeletal:         General: Edema (2+BLE) present.   Neurological: She is alert and oriented to person, place, and time.   Skin: Skin is warm and dry.   Psychiatric: She has a normal mood and affect. Her behavior is normal.       Assessment:     1. Nonrheumatic mitral valve stenosis  Basic Metabolic Panel   2. Pulmonary hypertension (HCC)     3. Tachycardia     4. Cardiac volume overload         Medical Decision Making:  Today's Assessment / Status / Plan:     Patient appears to be much more compensated than she was last week. Still remains volume overloaded, however must proceed with diuresis cautiously and does seem to be making progress on current regimen. HR much more well controlled.     Continue Lasix 40 mg BID with KCL 10 MEQ BID. BMP within one week.      Continue " ASA 81 mg daily and lopressor 12.5 mg BID.     Follow up with CTS Dr. Webber on 1/20/2021.     Patient will follow up with myself as scheduled below or earlier if needed. Encouraged patient to contact our office should any questions or concerns arise in the mean time. Patient understands and agrees with the plan of care.     Future Appointments   Date Time Provider Department Center   1/20/2021  1:15 PM Alphonse Webber M.D. [facility] None   3/2/2021  2:00 PM LUZ Pruitt. [facility] None

## 2021-01-21 NOTE — PATIENT INSTRUCTIONS
You have been scheduled for open heart surgery. A surgery scheduler will be calling you with a date for your preoperative testing.      Your surgery is scheduled for March 3, 2021 at 0800 at [facility]. (Times may change if emergency arises)       On March 2, 2021 at ________ check in at the [facility]; [address]. Park in the [address] [place] [address] Parking Garage or  Parking (located at the . [facility] Entrance) for [address], and proceed down the osorio to the door marked G-16.  TESTING WILL BEGIN AT____________. Please arrive 15 minutes before testing to complete paperwork.      Your preoperative testing may include noninvasive testing such as carotid duplex, vein mapping, blood work, urinalysis and chest x-ray. Lab work may not be done more than 72 hours before surgery. *It is ok to eat and drink before lab work*    You will also meet with the cardiac nurse navigator for preoperative teaching.      DO NOT EAT OR DRINK ANYTHING AFTER MIDNIGHT BEFORE SURGERY EXCEPT THE BLUE POWERADE DRINK. (Provided at your pre op appointment).      PLEASE STOP THE FOLLOWING MEDICATIONS ON THE TIMES STATED BELOW:     STOP 2 DAYS PRIOR TO SURGERY: ACE INHIBITOR (Lisinopril, enalapril, captopril, benazepril),  ANGIOTENSIN RECEPTOR BLOCKERS (cozaar, Diovan, Benicar, micardis, Avapro, atacand)                STOP 4 DAYS PRIOR TO SURGERY: ELIQUIS, XARELTO, PRADAXA, EFFIENT                 STOP 5 DAYS PRIOR TO SURGERY: COUMADIN, EXTRA STRENGTH ASPIRIN                 STOP 7 DAYS PRIOR TO SURGERY: PLAVIX, BRILANTA                 STOP IMMEDIATELY: FISH OIL, GLUCOSAMINE, VITAMIN E AND GINKO BILOBA       On the day of surgery check in at 5:15 am. Go to the [employer] [place] at [address] and park in the [facility] Garage. Take the garage elevator to “ground” and enter through the front doors. The check-in desk is in the front lobby as you enter the building and is clearly marked. Tell the   you are there to check-in for heart surgery.     Should you have any additional questions call the office at [phone] or the Cardiac surgery RN navigator at [phone].

## 2021-01-21 NOTE — PROGRESS NOTES
REFERRING PHYSICIAN: Velma Briceño MD.    CONSULTING PHYSICIAN: Alphonse Webber MD, FACS.    CHIEF COMPLAINT: Fatigue, Lower extremity edema.    HISTORY OF PRESENT ILLNESS: The patient is a 40 y.o. female with recent history of Covid 19. She traveled here from [facility] at the end of November and was experiencing worsening fatigue, shortness of breath and lower extremity edema. She presented to the hospital where she tested positive for Covid 19 and worked up for heart failure. She was found to severe mitral stenosis and severe tricuspid regurgitation. She was discharged home to recover from covid 19.Today, she states her shortness of breath is improved.  She still has pitting lower extremity edema but states it is getting better on her diuretics.  She still has increasing fatigue since October.  She has an exercise capacity of one half of a block.   She denies shortness of breath, chest pain,  dizziness, syncope, orthopnea, or PND.    PAST MEDICAL HISTORY:   Active Ambulatory Problems     Diagnosis Date Noted   • COVID-19 virus detected 12/14/2020   • Mitral stenosis 12/15/2020   • Pulmonary hypertension (HCC) 01/04/2021     Resolved Ambulatory Problems     Diagnosis Date Noted   • Pleuritic chest pain 12/14/2020     No Additional Past Medical History       PAST SURGICAL HISTORY: History reviewed. No pertinent surgical history.     ALLERGIES: No Known Allergies     CURRENT MEDICATIONS:   Current Outpatient Medications:   •  metoprolol (LOPRESSOR) 25 MG Tab, Take 0.5 Tabs by mouth 2 times a day., Disp: 45 Tab, Rfl: 0  •  furosemide (LASIX) 40 MG Tab, Take 1 Tab by mouth 2 times a day., Disp: 180 Tab, Rfl: 0  •  potassium chloride SA (K-DUR) 10 MEQ Tab CR, Take 1 Tab by mouth 2 times a day., Disp: 180 Tab, Rfl: 0    FAMILY HISTORY:   Family History   Problem Relation Age of Onset   • No Known Problems Mother    • Heart Disease Father    • Hypertension Maternal Uncle    • Heart Disease Maternal Uncle    •  Hypertension Paternal Uncle    • Heart Disease Paternal Uncle         SOCIAL HISTORY:   Social History     Socioeconomic History   • Marital status:      Spouse name: Not on file   • Number of children: 3   • Years of education: Not on file   • Highest education level: Not on file   Occupational History   • Not on file   Social Needs   • Financial resource strain: Somewhat hard   • Food insecurity     Worry: Sometimes true     Inability: Sometimes true   • Transportation needs     Medical: No     Non-medical: No   Tobacco Use   • Smoking status: Never Smoker   • Smokeless tobacco: Former User     Types: Chew   Substance and Sexual Activity   • Alcohol use: Not Currently     Frequency: 2-3 times a week     Drinks per session: 1 or 2   • Drug use: Not Currently   • Sexual activity: Not on file   Lifestyle   • Physical activity     Days per week: Not on file     Minutes per session: Not on file   • Stress: Not on file   Relationships   • Social connections     Talks on phone: Not on file     Gets together: Not on file     Attends Restorationist service: Not on file     Active member of club or organization: Not on file     Attends meetings of clubs or organizations: Not on file     Relationship status: Not on file   • Intimate partner violence     Fear of current or ex partner: Not on file     Emotionally abused: Not on file     Physically abused: Not on file     Forced sexual activity: Not on file   Other Topics Concern   • Not on file   Social History Narrative   • Not on file       REVIEW OF SYSTEMS:  Review of Systems   Constitutional: Positive for malaise/fatigue.   HENT: Negative.    Eyes: Negative.    Respiratory: Negative.    Cardiovascular: Positive for leg swelling.   Gastrointestinal: Negative.    Genitourinary: Negative.    Musculoskeletal: Negative.    Skin: Negative.    Neurological: Negative.    Endo/Heme/Allergies: Negative.    Psychiatric/Behavioral: Negative.      PHYSICAL EXAM:  BP (!) 96/60 (BP  "Location: Left arm, Patient Position: Sitting, BP Cuff Size: Adult)   Pulse (!) 114   Temp 36.1 °C (97 °F) (Temporal)   Ht 1.626 m (5' 4\")   Wt 65.8 kg (145 lb)   SpO2 96%   BMI 24.89 kg/m².      Physical Exam   Constitutional: She is oriented to person, place, and time and well-developed, well-nourished, and in no distress. No distress.   HENT:   Head: Normocephalic.   Eyes: Pupils are equal, round, and reactive to light.   Neck: Neck supple.   Cardiovascular: Normal rate and regular rhythm. Exam reveals no gallop.   Murmur heard.   Systolic murmur is present with a grade of 3/6.  Pulmonary/Chest: Effort normal and breath sounds normal. No respiratory distress. She has no wheezes. She has no rales.   Abdominal: Soft. Bowel sounds are normal. She exhibits no distension. There is no abdominal tenderness.   Musculoskeletal: Normal range of motion.         General: Edema present.      Comments: 1-2 + pitting edema   Neurological: She is alert and oriented to person, place, and time.   Skin: Skin is warm and dry.   Psychiatric: Mood, memory, affect and judgment normal.     LABS REVIEWED:  Lab Results   Component Value Date/Time    SODIUM 132 (L) 12/16/2020 05:20 AM    POTASSIUM 4.0 12/16/2020 05:20 AM    CHLORIDE 100 12/16/2020 05:20 AM    CO2 25 12/16/2020 05:20 AM    GLUCOSE 85 12/16/2020 05:20 AM    BUN 15 12/16/2020 05:20 AM    CREATININE 0.98 12/16/2020 05:20 AM      Lab Results   Component Value Date/Time    PROTHROMBTM 17.2 (H) 12/16/2020 05:20 AM    INR 1.36 (H) 12/16/2020 05:20 AM      Lab Results   Component Value Date/Time    WBC 6.5 12/16/2020 05:20 AM    RBC 3.96 (L) 12/16/2020 05:20 AM    HEMOGLOBIN 11.2 (L) 12/16/2020 05:20 AM    HEMATOCRIT 36.1 (L) 12/16/2020 05:20 AM    MCV 91.2 12/16/2020 05:20 AM    MCH 28.3 12/16/2020 05:20 AM    MCHC 31.0 (L) 12/16/2020 05:20 AM    MPV 10.3 12/16/2020 05:20 AM    NEUTSPOLYS 89.60 (H) 12/14/2020 03:57 PM    LYMPHOCYTES 5.20 (L) 12/14/2020 03:57 PM    MONOCYTES " 2.60 12/14/2020 03:57 PM    EOSINOPHILS 1.70 12/14/2020 03:57 PM    BASOPHILS 0.90 12/14/2020 03:57 PM    ANISOCYTOSIS 2+ (A) 12/14/2020 03:57 PM        IMAGING REVIEWED AND INTERPRETED:    ECHOCARDIOGRAM [facility] 12/15/2020:  Left ventricular ejection fraction is visually estimated to be 50%.  Severely dilated right ventricle. Reduced right ventricular systolic function.  Heavily calcified mitral valve with severe mitral stenosis.  Mean transvalvular gradient is 20 mmHg at a heart rate of 102 BPM.  Severe tricuspid regurgitation.  Estimated right ventricular systolic pressure is 105 mmHg.  Right heart pressures are consistent with severe pulmonary   hypertension.    ANGIOGRAM: None      IMPRESSION:  Severe mitral stenosis, severe tricuspid regurgitation, acute on chronic left ventricular systolic and diastolic failure.      PLAN:  I recommend that she undergo mitral valve replacement, tricuspid valve repair and intraoperative transesophageal echocardiography.    The procedure, its risks, benefits, potential complications and alternative treatments were discussed with the patient in detail including the risks should she decide not to undergo my recommended treatment. All of her questions were answered to her satisfaction and she is willing to proceed with the operation. The risks include death, stroke, infection: to include a rare bacterial infection related to the use of the heart/lung machine, ralph-operative myocardial infarction, dysrhythmias, diaphragmatic paralysis, chest wall paresthesia, tracheostomy, kidney or other organ failure, possible return to the operating room for bleeding, bleeding requiring transfusion with its attendant risks including AIDS or hepatitis, dehiscence of surgical incisions, respiratory complications including the need for prolonged ventilator support, Protamine or other drug reaction, peripheral neuropathy, loss of limb, and miscount of surgical items. The operative mortality risk is  approximately 3-5%. The STS mortality risk score is 1.2% and the morbidity and mortality risk score is 11% for mitral valve repair. The STS mortality risk score is 1.4% and the morbidity and mortality risk score is 13% for mitral valve replacement.The scores were discussed with patient.    The operation is scheduled for Wednesday, March 3, 2021 at 8:00 AM at [facility].  The differences between mechanical and tissue valves were explained to the patient as well as the anticoagulation requirements.  Valve choice is a difficult decision in her case.  The patient is from [facility] and her English is limited.  It is unclear how she is able to stay in the [place] and she has only been here for a few months.    Findings and recommendations have been discussed with the patient’s cardiologist, Velma Briceño MD.  Thank you for this very challenging consultation and participation in the patient’s care.  I will keep you apprised of all future developments.      Sincerely,      Alphonse Webber MD, FACS.

## 2021-02-03 PROBLEM — E16.2 HYPOGLYCEMIA: Status: ACTIVE | Noted: 2021-01-01

## 2021-02-03 PROBLEM — D68.9 COAGULOPATHY (HCC): Status: ACTIVE | Noted: 2021-01-01

## 2021-02-03 PROBLEM — E87.1 HYPONATREMIA: Status: ACTIVE | Noted: 2021-01-01

## 2021-02-03 PROBLEM — I09.81: Status: ACTIVE | Noted: 2021-01-01

## 2021-02-03 PROBLEM — E80.6 BILIRUBINEMIA: Status: ACTIVE | Noted: 2021-01-01

## 2021-02-03 PROBLEM — R41.82 AMS (ALTERED MENTAL STATUS): Status: ACTIVE | Noted: 2021-01-01

## 2021-02-03 PROBLEM — N17.9 AKI (ACUTE KIDNEY INJURY) (HCC): Status: ACTIVE | Noted: 2021-01-01

## 2021-02-03 PROBLEM — I50.9 CHF, ACUTE ON CHRONIC (HCC): Status: ACTIVE | Noted: 2021-01-01

## 2021-02-03 PROBLEM — R57.0 CARDIOGENIC SHOCK (HCC): Status: ACTIVE | Noted: 2021-01-01

## 2021-02-03 NOTE — ASSESSMENT & PLAN NOTE
Suspect cardiorenal  Monitor renal function and urine output  Avoid nephrotoxins and renal dose medications

## 2021-02-03 NOTE — INFECTION CONTROL
This patient is considered COVID RECOVERED.    Patient initially tested positive for COVID on 12/14/2020.  Patient is greater than or equal to 21 days from symptom onset and/or positive test, with symptom improvement.  Per the CDC guidance, this patient no longer requires transmission based precautions.  Patient may be placed on any unit per the bed assignment policy (except CNU), including placement in a semi-private room with a roommate.

## 2021-02-03 NOTE — ED TRIAGE NOTES
Chief Complaint   Patient presents with   • Hypotension   pt brought in via ems for complaints of weakness starting this morning and getting progressively worse. Pt was found down at home. Pt has recent dx of chf and mitral valve stenosis. No other medical hx. Pt had low bgl 64 on site as well as hypotension and tachycardia.

## 2021-02-03 NOTE — CONSULTS
Critical Care Consultation    Date of consult: 2/3/2021    Referring Physician  Issac Jackson M.D.    Reason for Consultation  Altered mental status, hypoglycemia, hypotension    History of Presenting Illness  40 y.o. female with history of severe mitral valve stenosis (scheduled for mitral valve replacement next month) who presented via EMS 2/3/2021 with altered mental status after she was found on the ground in the bathroom at home with a fingerstick blood sugar of 60.  On arrival to the ED the patient has complaints of chest pain, shortness of breath, and worsening lower extremity swelling.    Patient does complain of epigastric discomfort which she said has been intermittent for the last month.  She states that she has been compliant with her medications.  She denies any history of diabetes or taking any insulin.    Of note, patient had a positive COVID-19 test in mid-December, but is now negative.    Code Status  Full Code    Review of Systems  Review of Systems   Constitutional: Negative for fever.   HENT: Negative for congestion.    Respiratory: Positive for cough and shortness of breath. Negative for sputum production.    Cardiovascular: Positive for chest pain and leg swelling. Negative for palpitations.   Gastrointestinal: Negative for abdominal pain.   Skin: Negative for itching.   Neurological: Negative for headaches.   All other systems reviewed and are negative.      Past Medical History   has no past medical history on file.    Surgical History   has no past surgical history on file.    Family History  family history includes Heart Disease in her father, maternal uncle, and paternal uncle; Hypertension in her maternal uncle and paternal uncle; No Known Problems in her mother.    Social History   reports that she has never smoked. She quit smokeless tobacco use about 4 weeks ago.  Her smokeless tobacco use included chew. She reports previous alcohol use. She reports previous drug  use.    Medications  Home Medications    **Home medications have not yet been reviewed for this encounter**       Current Facility-Administered Medications   Medication Dose Route Frequency Provider Last Rate Last Admin   • vancomycin (VANCOCIN) 1,500 mg in  mL IVPB  25 mg/kg Intravenous Once Issac Jackson M.D. 83.3 mL/hr at 02/03/21 0149 1,500 mg at 02/03/21 0149   • heparin injection 5,000 Units  5,000 Units Subcutaneous Q8HRS Stefano Mcgregor M.D.         Current Outpatient Medications   Medication Sig Dispense Refill   • metoprolol (LOPRESSOR) 25 MG Tab Take 0.5 Tabs by mouth 2 times a day. 45 Tab 0   • furosemide (LASIX) 40 MG Tab Take 1 Tab by mouth 2 times a day. 180 Tab 0   • potassium chloride SA (K-DUR) 10 MEQ Tab CR Take 1 Tab by mouth 2 times a day. 180 Tab 0       Allergies  No Known Allergies    Vital Signs last 24 hours  Temp:  [35.6 °C (96 °F)-35.7 °C (96.3 °F)] 35.7 °C (96.3 °F)  Pulse:  [113-119] 119  Resp:  [23-33] 27  BP: ()/(48-68) 88/63  SpO2:  [70 %-100 %] 100 %    Physical Exam  Physical Exam  Vitals signs and nursing note reviewed. Exam conducted with a chaperone present.   Constitutional:       Appearance: Normal appearance. She is ill-appearing.   HENT:      Head: Normocephalic and atraumatic.      Right Ear: External ear normal.      Left Ear: External ear normal.      Nose: Nose normal.      Mouth/Throat:      Mouth: Mucous membranes are moist.      Pharynx: Oropharynx is clear.   Eyes:      Extraocular Movements: Extraocular movements intact.      Pupils: Pupils are equal, round, and reactive to light.   Neck:      Musculoskeletal: Normal range of motion and neck supple.   Cardiovascular:      Rate and Rhythm: Normal rate and regular rhythm.      Pulses: Normal pulses.   Pulmonary:      Effort: Pulmonary effort is normal.      Breath sounds: Normal breath sounds.   Abdominal:      Palpations: Abdomen is soft.   Musculoskeletal:      Right lower leg: Edema present.       Left lower leg: Edema present.   Skin:     General: Skin is warm and dry.      Capillary Refill: Capillary refill takes 2 to 3 seconds.   Neurological:      General: No focal deficit present.      Mental Status: She is alert.         Fluids    Intake/Output Summary (Last 24 hours) at 2/3/2021 0354  Last data filed at 2/3/2021 0154  Gross per 24 hour   Intake 2005 ml   Output --   Net 2005 ml       Laboratory  Recent Results (from the past 48 hour(s))   EKG    Collection Time: 21 12:53 AM   Result Value Ref Range    Report       [facility]    Test Date:  2021  Pt Name:    SARAH CORMIER              Department: ER  MRN:        [id number]                      Room:       [facility]  Gender:     Female                       Technician: [id number]  :        1980                   Requested By:NAT RENEE  Order #:    [id number]                    Reading MD:    Measurements  Intervals                                Axis  Rate:       113                          P:          257  ID:         [id number]                          QRS:        116  QRSD:       96                           T:          94  QT:         380  QTc:        521    Interpretive Statements  ECTOPIC ATRIAL TACHYCARDIA  RIGHT VENTRICULAR HYPERTROPHY  REPOL ABNRM SUGGESTS ISCHEMIA, DIFFUSE LEADS  PROLONGED QT INTERVAL  Compared to ECG 2021 13:31:33  Right ventricular hypertrophy now present  Early repolarization now present  Possible ischemia now present  Prolonged QT interval now present  Sinus tachycardia n o longer present  Atrial abnormality no longer present  T-wave abnormality no longer present     ACCU-CHEK GLUCOSE    Collection Time: 21 12:59 AM   Result Value Ref Range    Glucose - Accu-Ck 24 (LL) 65 - 99 mg/dL   HCG QUAL SERUM    Collection Time: 21  1:10 AM   Result Value Ref Range    Beta-Hcg Qualitative Serum Negative Negative   CBC WITH DIFFERENTIAL    Collection Time: 21  1:10  AM   Result Value Ref Range    WBC 11.2 (H) 4.8 - 10.8 K/uL    RBC 3.77 (L) 4.20 - 5.40 M/uL    Hemoglobin 10.5 (L) 12.0 - 16.0 g/dL    Hematocrit 34.2 (L) 37.0 - 47.0 %    MCV 90.7 81.4 - 97.8 fL    MCH 27.9 27.0 - 33.0 pg    MCHC 30.7 (L) 33.6 - 35.0 g/dL    RDW 61.8 (H) 35.9 - 50.0 fL    Platelet Count 239 164 - 446 K/uL    MPV 11.3 9.0 - 12.9 fL    Neutrophils-Polys 82.20 (H) 44.00 - 72.00 %    Lymphocytes 11.90 (L) 22.00 - 41.00 %    Monocytes 4.60 0.00 - 13.40 %    Eosinophils 0.20 0.00 - 6.90 %    Basophils 0.40 0.00 - 1.80 %    Immature Granulocytes 0.70 0.00 - 0.90 %    Nucleated RBC 0.00 /100 WBC    Neutrophils (Absolute) 9.24 (H) 2.00 - 7.15 K/uL    Lymphs (Absolute) 1.34 1.00 - 4.80 K/uL    Monos (Absolute) 0.52 0.00 - 0.85 K/uL    Eos (Absolute) 0.02 0.00 - 0.51 K/uL    Baso (Absolute) 0.04 0.00 - 0.12 K/uL    Immature Granulocytes (abs) 0.08 0.00 - 0.11 K/uL    NRBC (Absolute) 0.00 K/uL   COMP METABOLIC PANEL    Collection Time: 02/03/21  1:10 AM   Result Value Ref Range    Sodium 122 (L) 135 - 145 mmol/L    Potassium 4.1 3.6 - 5.5 mmol/L    Chloride 86 (L) 96 - 112 mmol/L    Co2 9 (LL) 20 - 33 mmol/L    Anion Gap 27.0 (H) 7.0 - 16.0    Glucose 132 (H) 65 - 99 mg/dL    Bun 25 (H) 8 - 22 mg/dL    Creatinine 2.17 (H) 0.50 - 1.40 mg/dL    Calcium 8.8 8.5 - 10.5 mg/dL    AST(SGOT) 32 12 - 45 U/L    ALT(SGPT) 11 2 - 50 U/L    Alkaline Phosphatase 124 (H) 30 - 99 U/L    Total Bilirubin 4.1 (H) 0.1 - 1.5 mg/dL    Albumin 2.8 (L) 3.2 - 4.9 g/dL    Total Protein 8.3 (H) 6.0 - 8.2 g/dL    Globulin 5.5 (H) 1.9 - 3.5 g/dL    A-G Ratio 0.5 g/dL   LACTIC ACID    Collection Time: 02/03/21  1:10 AM   Result Value Ref Range    Lactic Acid 12.3 (HH) 0.5 - 2.0 mmol/L   proBrain Natriuretic Peptide, NT    Collection Time: 02/03/21  1:10 AM   Result Value Ref Range    NT-proBNP 80442 (H) 0 - 125 pg/mL   MAGNESIUM    Collection Time: 02/03/21  1:10 AM   Result Value Ref Range    Magnesium 1.8 1.5 - 2.5 mg/dL   TROPONIN     Collection Time: 02/03/21  1:10 AM   Result Value Ref Range    Troponin T 102 (H) 6 - 19 ng/L   APTT    Collection Time: 02/03/21  1:10 AM   Result Value Ref Range    APTT 43.1 (H) 24.7 - 36.0 sec   PROTHROMBIN TIME    Collection Time: 02/03/21  1:10 AM   Result Value Ref Range    PT 28.4 (H) 12.0 - 14.6 sec    INR 2.57 (H) 0.87 - 1.13   ESTIMATED GFR    Collection Time: 02/03/21  1:10 AM   Result Value Ref Range    GFR If African American 30 (A) >60 mL/min/1.73 m 2    GFR If Non African American 25 (A) >60 mL/min/1.73 m 2   ACCU-CHEK GLUCOSE    Collection Time: 02/03/21  1:21 AM   Result Value Ref Range    Glucose - Accu-Ck 93 65 - 99 mg/dL   COV-2, FLU A/B, AND RSV BY PCR (2-4 HOURS CEPHEID): Collect NP swab in VTM    Collection Time: 02/03/21  1:30 AM    Specimen: Respirate   Result Value Ref Range    Influenza virus A RNA Negative Negative    Influenza virus B, PCR Negative Negative    RSV, PCR Negative Negative    SARS-CoV-2 by PCR NotDetected     SARS-CoV-2 Source NP Swab    ACCU-CHEK GLUCOSE    Collection Time: 02/03/21  1:50 AM   Result Value Ref Range    Glucose - Accu-Ck 87 65 - 99 mg/dL   ACCU-CHEK GLUCOSE    Collection Time: 02/03/21  3:11 AM   Result Value Ref Range    Glucose - Accu-Ck 86 65 - 99 mg/dL       Imaging  EC-ECHOCARDIOGRAM LTD W/O CONT         DX-CHEST-PORTABLE (1 VIEW)   Final Result      1.  Hazy diffuse bilateral pulmonary opacities which could be related to pulmonary edema and/or infection.   2.  Marked cardiac mediastinal silhouette enlargement is again noted.   3.  Small left pleural effusion appears mildly improved.      US-RUQ    (Results Pending)       Assessment/Plan  Mitral stenosis- (present on admission)  Assessment & Plan  STAT TTE - completed, waiting for read  Preload dependent - received 2 L crystalloid  BNP 15362    Bilirubinemia- (present on admission)  Assessment & Plan  Likely congestive  T bilirubin 4.1  Check RUQ ultrasound  Check amylase/lipase    Hyponatremia-  (present on admission)  Assessment & Plan  Na 122  Free water restriction  Monitor closely    RUSH (acute kidney injury) (HCC)- (present on admission)  Assessment & Plan  Likely due to poor cardiac output  Creatinine 2.17  Avoid nephrotoxins  Monitor electrolytes and replete as needed  Renally dose adjust medications  Strict I/O monitoring    Hypoglycemia- (present on admission)  Assessment & Plan  Blood glucose 60s for EMS  Received dextrose in the ED  FSBS now in the 80s with improved mentation      Discussed patient condition and risk of morbidity and/or mortality with Hospitalist, Family, RN, RT, Pharmacy, Patient and ERP.    The patient remains critically ill.  Critical care time = 45 minutes in directly providing and coordinating critical care and extensive data review.  No time overlap and excludes procedures.

## 2021-02-03 NOTE — H&P
Hospital Medicine History & Physical Note    Date of Service  2/3/2021    Primary Care Physician  No primary care provider on file.    Consultants  Cardiology- Dr. Conte    Critical Care - Dr. Molina     Code Status  Full Code    Chief Complaint  Chief Complaint   Patient presents with   • Hypotension       History of Presenting Illness  40 y.o. female with recent diagnosis of severe tricuspid regurgitation, severe mitral stenosis, CHF 2/3/2021 after she was found down on the floor as per EMS. According to EMS, her blood glucose was in the 60s given IV D50 with improvement. In the ER, she was found to be hypotensive and tachycardic. Given that she met criteria for sepsis on vital signs and blood work she was treated with IVF LR 30 ml/kg, cultures drawn and broad spectrum antibiotics Vancomycin and Zosyn. However there may be a question if sepsis is truly present as a source of infection is not found which may be secondary to valvular endocarditis. Labs showed Na of 122, Potassium of 4.1, chloride of 86, bicarb of 9, HAGMA 27, BUN 25, Cr of 2.17, total bilirubin of 4.1 Lactic acid of 12.3, troponin of 102 and BNP of 11,344. Cardiology was consulted who did a stat echocardiogram and recommended CT surgery consult in am.     Review of Systems  Review of Systems   Neurological: Positive for loss of consciousness.   All other systems reviewed and are negative.      Past Medical History   has a past medical history of Heart valve disease, Moderate to severe pulmonary hypertension (HCC), and Severe tricuspid regurgitation.    Surgical History  reviewed and not pertinent     Family History  family history includes Heart Disease in her father, maternal uncle, and paternal uncle; Hypertension in her maternal uncle and paternal uncle; No Known Problems in her mother.     Social History   reports that she has never smoked. She quit smokeless tobacco use about 4 weeks ago.  Her smokeless tobacco use included chew. She reports  previous alcohol use. She reports previous drug use.    Allergies  No Known Allergies    Medications  Prior to Admission Medications   Prescriptions Last Dose Informant Patient Reported? Taking?   furosemide (LASIX) 40 MG Tab   No No   Sig: Take 1 Tab by mouth 2 times a day.   metoprolol (LOPRESSOR) 25 MG Tab   No No   Sig: Take 0.5 Tabs by mouth 2 times a day.   potassium chloride SA (K-DUR) 10 MEQ Tab CR   No No   Sig: Take 1 Tab by mouth 2 times a day.      Facility-Administered Medications: None       Physical Exam  Temp:  [35.6 °C (96 °F)-35.7 °C (96.3 °F)] 35.7 °C (96.3 °F)  Pulse:  [113-119] 119  Resp:  [23-33] 27  BP: ()/(48-68) 88/63  SpO2:  [70 %-100 %] 100 %    Physical Exam  Constitutional:       Comments: Unkempt    HENT:      Head: Normocephalic and atraumatic.      Mouth/Throat:      Mouth: Mucous membranes are dry.      Pharynx: Oropharynx is clear.   Eyes:      Extraocular Movements: Extraocular movements intact.      Pupils: Pupils are equal, round, and reactive to light.   Neck:      Musculoskeletal: Neck supple.   Cardiovascular:      Rate and Rhythm: Regular rhythm. Tachycardia present.      Heart sounds: Murmur present.   Abdominal:      General: Bowel sounds are normal.      Palpations: Abdomen is soft.   Musculoskeletal: Normal range of motion.      Right lower leg: Edema present.      Left lower leg: Edema present.   Skin:     General: Skin is warm and dry.      Capillary Refill: Capillary refill takes 2 to 3 seconds.   Neurological:      General: No focal deficit present.   Psychiatric:         Mood and Affect: Mood normal.         Behavior: Behavior normal.         Laboratory:  Recent Labs     02/03/21  0110   WBC 11.2*   RBC 3.77*   HEMOGLOBIN 10.5*   HEMATOCRIT 34.2*   MCV 90.7   MCH 27.9   MCHC 30.7*   RDW 61.8*   PLATELETCT 239   MPV 11.3     Recent Labs     02/03/21  0110   SODIUM 122*   POTASSIUM 4.1   CHLORIDE 86*   CO2 9*   GLUCOSE 132*   BUN 25*   CREATININE 2.17*   CALCIUM  8.8     Recent Labs     02/03/21 0110   ALTSGPT 11   ASTSGOT 32   ALKPHOSPHAT 124*   TBILIRUBIN 4.1*   GLUCOSE 132*     Recent Labs     02/03/21  0110   APTT 43.1*   INR 2.57*     Recent Labs     02/03/21 0110   NTPROBNP 50861*         Recent Labs     02/03/21 0110   TROPONINT 102*       Imaging:  EC-ECHOCARDIOGRAM LTD W/O CONT         DX-CHEST-PORTABLE (1 VIEW)   Final Result      1.  Hazy diffuse bilateral pulmonary opacities which could be related to pulmonary edema and/or infection.   2.  Marked cardiac mediastinal silhouette enlargement is again noted.   3.  Small left pleural effusion appears mildly improved.      US-RUQ    (Results Pending)     TTE 12/15/2020  CONCLUSIONS  No prior study is available for comparison.   Left ventricular systolic function is low normal.  Left ventricular ejection fraction is visually estimated to be 50%.  Severely dilated right ventricle. Reduced right ventricular systolic function.  Heavily calcified mitral valve with severe mitral stenosis.  Mean transvalvular gradient is 20 mmHg at a heart rate of 102 BPM.  Severe tricuspid regurgitation.  Estimated right ventricular systolic pressure is 105 mmHg.  Right heart pressures are consistent with severe pulmonary hypertension       Assessment/Plan:  I anticipate this patient will require at least two midnights for appropriate medical management, necessitating inpatient admission.    * RUSH (acute kidney injury) (HCC)- (present on admission)  Assessment & Plan  Secondary to low CO  Avoid nephrotoxins  Monitor intake/output   Renally adjust all medications     Mitral stenosis- (present on admission)  Assessment & Plan  Telemetry monitoring   Echocardiogram stat done by Dr. Conte, cardiology. Consult CT surgery in am.   Follow official read.   Received 2 liters of IVF.   Serial troponin/ekg     Coagulopathy (HCC)  Assessment & Plan  Secondary to congestion    CHF, acute on chronic (HCC)  Assessment & Plan  Telemetry monitoring    Serial troponin/EKG   Official Echocardiogram transthoracic report   BIPAP  CT surgery consult in am     AMS (altered mental status)  Assessment & Plan  AMS most likely secondary to hypoglycemic attack. After D50 mental status improved  Continue to monitor.     Bilirubinemia- (present on admission)  Assessment & Plan  Serial abdominal examinations  Check Amylase and Lipase  RUQ sonogram     Hyponatremia- (present on admission)  Assessment & Plan  Most likely secondary to CHF  Fluid restriction  Monitor BMP     Hypoglycemia- (present on admission)  Assessment & Plan  Accuchecks with hypoglycemia protocol. IV D50 prn  Hold insulin        VTE: heparin subcutaneous

## 2021-02-03 NOTE — PROGRESS NOTES
Dr Molina at bedside prior to change of shift to see patient during bedside ABD U/S. Discussed with him and pharmacy potential of giving patient digoxin. MD states not at this time.   Dr Conte from cardiology rounded at change of shift. Placed orders for 1x dose of digoxin for HR. Also placed orders for 40 mg lasix now. Discussed desire to put patient on flolan. Called RT to verify that flolan can be given via bipap or high flow. Dr Conte asked this RN to ask RT to place orders. RT states they are unable to place orders for flolan. Spoke with Dr Case at bedside and he  States he will touch base with cardiology and will place orders as appropriate. Report given to isaac RN.

## 2021-02-03 NOTE — ASSESSMENT & PLAN NOTE
Blood glucose 60s for EMS  Received dextrose in the ED  FSBS now in the 80s with improved mentation

## 2021-02-03 NOTE — ASSESSMENT & PLAN NOTE
Telemetry monitoring   Echocardiogram stat done by Dr. Conte, cardiology. Consult CT surgery in am.   Follow official read.   Received 2 liters of IVF.   Serial troponin/ekg

## 2021-02-03 NOTE — PROCEDURES
"Arterial Line Insertion    Date/Time: 2/3/2021 11:54 AM  Performed by: Pino Case M.D.  Authorized by: Pino Case M.D.   Consent: Verbal consent obtained.  Risks and benefits: risks, benefits and alternatives were discussed  Consent given by: patient  Patient understanding: patient states understanding of the procedure being performed  Patient consent: the patient's understanding of the procedure matches consent given  Procedure consent: procedure consent matches procedure scheduled  Relevant documents: relevant documents present and verified  Test results: test results available and properly labeled  Site marked: the operative site was marked  Imaging studies: imaging studies available  Required items: required blood products, implants, devices, and special equipment available  Patient identity confirmed: verbally with patient  Time out: Immediately prior to procedure a \"time out\" was called to verify the correct patient, procedure, equipment, support staff and site/side marked as required.  Preparation: Patient was prepped and draped in the usual sterile fashion.  Indications: multiple ABGs, respiratory failure and hemodynamic monitoring  Location: left radial  Anesthesia: local infiltration    Anesthesia:  Local Anesthetic: lidocaine 1% without epinephrine  Emile's test normal: yes  Needle gauge: 20  Seldinger technique: Seldinger technique used  Number of attempts: 1  Post-procedure: line sutured  Post-procedure CMS: normal  Patient tolerance: patient tolerated the procedure well with no immediate complications    Central Line Insertion    Date/Time: 2/3/2021 11:54 AM  Performed by: Pino Case M.D.  Authorized by: Pino Case M.D.     Consent:     Consent obtained:  Written    Consent given by:  Patient    Risks discussed:  Arterial puncture, incorrect placement, bleeding, infection, nerve damage and pneumothorax  Pre-procedure details:     Hand hygiene: Hand hygiene performed prior to " insertion      Sterile barrier technique: All elements of maximal sterile technique followed      Skin preparation:  2% chlorhexidine    Skin preparation agent: Skin preparation agent completely dried prior to procedure    Sedation:     Sedation type:  None  Anesthesia:     Anesthesia method:  Local infiltration    Local anesthetic:  Lidocaine 1% w/o epi  Procedure details:     Location:  L internal jugular    Patient position:  Flat    Procedural supplies:  Triple lumen    Catheter size:  7 Fr    Landmarks identified: yes      Ultrasound guidance: yes      Sterile ultrasound techniques: Sterile gel and sterile probe covers were used      Number of attempts:  1    Successful placement: yes    Post-procedure details:     Post-procedure:  Dressing applied and line sutured    Assessment:  Blood return through all ports, placement verified by x-ray and free fluid flow    Patient tolerance of procedure:  Tolerated well, no immediate complications

## 2021-02-03 NOTE — PROGRESS NOTES
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  Echo demonstrated RV failure in the setting of pulmonary artery hypertension as well as known severe mitral stenosis with a preserved left ventricular function.    Bedside evaluation demonstrates significant volume overload with bilateral pitting edema and elevated JVD.    Plan to perform high flow nasal cannula with Flolan to improve pulmonary artery pressure.  Digoxin first slowed her atrial fibrillation and now she is in sinus rhythm with a normal rate.  She is anticoagulated with heparin, vitamin K given for elevated INR in the setting of liver failure.  Her hemodynamics are tenuous, will attempt to decompress her RV further with increased Lasix dose and frequency in order to gently diurese her.     Awaiting cardiac surgery evaluation.    The patient remains critically ill.  Critical care time = 35 minutes in directly providing and coordinating critical care and extensive data review.  No time overlap and excludes procedures.    Pino Case M.D.

## 2021-02-03 NOTE — ASSESSMENT & PLAN NOTE
AMS most likely secondary to hypoglycemic attack. After D50 mental status improved  Continue to monitor.

## 2021-02-03 NOTE — CONSULTS
Cardiology Consult Note:    Shai Conte M.D.  Date & Time note created:    2/3/2021   6:42 AM     Referring MD:  Dr. Mcgregor    Patient ID:   Name:             Carolyn Hauser     YOB: 1980  Age:                 40 y.o.  female   MRN:               [id number]                                                             Reason for Consult:      Cardiogenic shock    History of Present Illness:    41 yo F with PMH significant for rheumatic mitral stenosis, last mean gradient of 20 mm Hg and severe pulmonary hypertension.  She presented with a change in mental status after being found down.  She is scheduled for surgery coming up at the end of next month and is recovering from COVID.  She was found down in her room by her family tonight with, non-respobnsive.  She brought in high a low blood suar with margina response to PO glucose.  In the ER she was noted to have an elevated lactate and new onset renal failure.  She is unable to answer questions.  A STAT echo was obtained tonight showing severe mitral stenosis, severe pulmonary hypertension and a new onset atrial tachycardia.  Her BP is borderline.      Review of Systems:      Unable to obtain due to mental status              Past Medical History:   Past Medical History:   Diagnosis Date   • Heart valve disease    • Moderate to severe pulmonary hypertension (HCC)    • Severe tricuspid regurgitation      Active Hospital Problems    Diagnosis   • RUSH (acute kidney injury) (HCC) [N17.9]     Priority: High   • Mitral stenosis [I05.0]     Priority: High   • Hypoglycemia [E16.2]   • Hyponatremia [E87.1]   • Bilirubinemia [E80.6]   • AMS (altered mental status) [R41.82]   • CHF, acute on chronic (HCC) [I50.9]       Past Surgical History:  No past surgical history on file.    Hospital Medications:  Current Facility-Administered Medications   Medication Dose   • heparin injection 5,000 Units  5,000 Units   • digoxin (LANOXIN) injection 500 mcg  500 mcg    • furosemide (LASIX) injection 40 mg  40 mg   • digoxin (LANOXIN) injection 250 mcg  250 mcg   • digoxin (LANOXIN) injection 250 mcg  250 mcg         Current Outpatient Medications:  Medications Prior to Admission   Medication Sig Dispense Refill Last Dose   • metoprolol (LOPRESSOR) 25 MG Tab Take 0.5 Tabs by mouth 2 times a day. 45 Tab 0    • furosemide (LASIX) 40 MG Tab Take 1 Tab by mouth 2 times a day. 180 Tab 0    • potassium chloride SA (K-DUR) 10 MEQ Tab CR Take 1 Tab by mouth 2 times a day. 180 Tab 0        Medication Allergy:  No Known Allergies    Family History:  Family History   Problem Relation Age of Onset   • No Known Problems Mother    • Heart Disease Father    • Hypertension Maternal Uncle    • Heart Disease Maternal Uncle    • Hypertension Paternal Uncle    • Heart Disease Paternal Uncle        Social History:  Social History     Socioeconomic History   • Marital status:      Spouse name: Not on file   • Number of children: 3   • Years of education: Not on file   • Highest education level: Not on file   Occupational History   • Not on file   Social Needs   • Financial resource strain: Somewhat hard   • Food insecurity     Worry: Sometimes true     Inability: Sometimes true   • Transportation needs     Medical: No     Non-medical: No   Tobacco Use   • Smoking status: Never Smoker   • Smokeless tobacco: Former User     Types: Chew   Substance and Sexual Activity   • Alcohol use: Not Currently     Frequency: 2-3 times a week     Drinks per session: 1 or 2   • Drug use: Not Currently   • Sexual activity: Not on file   Lifestyle   • Physical activity     Days per week: Not on file     Minutes per session: Not on file   • Stress: Not on file   Relationships   • Social connections     Talks on phone: Not on file     Gets together: Not on file     Attends [employer] service: Not on file     Active member of club or organization: Not on file     Attends meetings of clubs or organizations: Not on  "file     Relationship status: Not on file   • Intimate partner violence     Fear of current or ex partner: Not on file     Emotionally abused: Not on file     Physically abused: Not on file     Forced sexual activity: Not on file   Other Topics Concern   • Not on file   Social History Narrative   • Not on file         Physical Exam:  Vitals/ General Appearance:   Weight/BMI: Body mass index is 25.39 kg/m².  BP (!) 93/50   Pulse (!) 120   Temp 36.6 °C (97.8 °F) (Temporal)   Resp (!) 23   Ht 1.626 m (5' 4\")   Wt 67.1 kg (147 lb 14.9 oz)   SpO2 97%   Vitals:    02/03/21 0530 02/03/21 0545 02/03/21 0600 02/03/21 0605   BP: (!) 81/58 (!) 86/53 (!) 84/51 (!) 93/50   Pulse: (!) 120 (!) 120 (!) 120 (!) 120   Resp: 20 (!) 23 (!) 24 (!) 23   Temp:       TempSrc:       SpO2: 96% 96% 100% 97%   Weight:       Height:         Oxygen Therapy:  Pulse Oximetry: 97 %, O2 Delivery Device: None - Room Air    Constitutional:   Well developed, Well nourished, No acute distress  HENMT:  Normocephalic, Atraumatic, Oropharynx moist mucous membranes, No oral exudates, Nose normal.  No thyromegaly.  Eyes:  EOMI, Conjunctiva normal, No discharge.  Neck:  Normal range of motion, No cervical tenderness,  no JVD.  Cardiovascular:  S1, split S2, loud grade 3 murmur in right mid chest, RV heave, torres V waves to above the angle of mandible, 2+ pitting edema in the b/l LEs  Lungs:  Normal breath sounds, breath sounds clear to auscultation bilaterally,  no crackles, no wheezing.   Abdomen: Bowel sounds normal, Soft, No tenderness, No guarding, No rebound, No masses, No hepatosplenomegaly.  Skin: Warm, Dry, No erythema, No rash, no induration.  Neurologic: AAO x 1, sleepy  Psychiatric: Unable to assess      MDM (Data Review):     Records reviewed and summarized in current documentation    Lab Data Review:  Recent Results (from the past 24 hour(s))   EKG    Collection Time: 02/03/21 12:53 AM   Result Value Ref Range    Report       [facility] Emergency Dept.    Test Date:  2021  Pt Name:    SARAH CORMIER              Department: ER  MRN:        [id number]                      Room:        25  Gender:     Female                       Technician: [id number]  :        1980                   Requested By:NAT RENEE  Order #:    [id number]                    Reading MD:    Measurements  Intervals                                Axis  Rate:       113                          P:          257  AR:         168                          QRS:        116  QRSD:       96                           T:          94  QT:         380  QTc:        521    Interpretive Statements  ECTOPIC ATRIAL TACHYCARDIA  RIGHT VENTRICULAR HYPERTROPHY  REPOL ABNRM SUGGESTS ISCHEMIA, DIFFUSE LEADS  PROLONGED QT INTERVAL  Compared to ECG 2021 13:31:33  Right ventricular hypertrophy now present  Early repolarization now present  Possible ischemia now present  Prolonged QT interval now present  Sinus tachycardia n o longer present  Atrial abnormality no longer present  T-wave abnormality no longer present     ACCU-CHEK GLUCOSE    Collection Time: 21 12:59 AM   Result Value Ref Range    Glucose - Accu-Ck 24 (LL) 65 - 99 mg/dL   HCG QUAL SERUM    Collection Time: 21  1:10 AM   Result Value Ref Range    Beta-Hcg Qualitative Serum Negative Negative   CBC WITH DIFFERENTIAL    Collection Time: 21  1:10 AM   Result Value Ref Range    WBC 11.2 (H) 4.8 - 10.8 K/uL    RBC 3.77 (L) 4.20 - 5.40 M/uL    Hemoglobin 10.5 (L) 12.0 - 16.0 g/dL    Hematocrit 34.2 (L) 37.0 - 47.0 %    MCV 90.7 81.4 - 97.8 fL    MCH 27.9 27.0 - 33.0 pg    MCHC 30.7 (L) 33.6 - 35.0 g/dL    RDW 61.8 (H) 35.9 - 50.0 fL    Platelet Count 239 164 - 446 K/uL    MPV 11.3 9.0 - 12.9 fL    Neutrophils-Polys 82.20 (H) 44.00 - 72.00 %    Lymphocytes 11.90 (L) 22.00 - 41.00 %    Monocytes 4.60 0.00 - 13.40 %    Eosinophils 0.20 0.00 - 6.90 %    Basophils 0.40 0.00 - 1.80 %     Immature Granulocytes 0.70 0.00 - 0.90 %    Nucleated RBC 0.00 /100 WBC    Neutrophils (Absolute) 9.24 (H) 2.00 - 7.15 K/uL    Lymphs (Absolute) 1.34 1.00 - 4.80 K/uL    Monos (Absolute) 0.52 0.00 - 0.85 K/uL    Eos (Absolute) 0.02 0.00 - 0.51 K/uL    Baso (Absolute) 0.04 0.00 - 0.12 K/uL    Immature Granulocytes (abs) 0.08 0.00 - 0.11 K/uL    NRBC (Absolute) 0.00 K/uL   COMP METABOLIC PANEL    Collection Time: 02/03/21  1:10 AM   Result Value Ref Range    Sodium 122 (L) 135 - 145 mmol/L    Potassium 4.1 3.6 - 5.5 mmol/L    Chloride 86 (L) 96 - 112 mmol/L    Co2 9 (LL) 20 - 33 mmol/L    Anion Gap 27.0 (H) 7.0 - 16.0    Glucose 132 (H) 65 - 99 mg/dL    Bun 25 (H) 8 - 22 mg/dL    Creatinine 2.17 (H) 0.50 - 1.40 mg/dL    Calcium 8.8 8.5 - 10.5 mg/dL    AST(SGOT) 32 12 - 45 U/L    ALT(SGPT) 11 2 - 50 U/L    Alkaline Phosphatase 124 (H) 30 - 99 U/L    Total Bilirubin 4.1 (H) 0.1 - 1.5 mg/dL    Albumin 2.8 (L) 3.2 - 4.9 g/dL    Total Protein 8.3 (H) 6.0 - 8.2 g/dL    Globulin 5.5 (H) 1.9 - 3.5 g/dL    A-G Ratio 0.5 g/dL   LACTIC ACID    Collection Time: 02/03/21  1:10 AM   Result Value Ref Range    Lactic Acid 12.3 (HH) 0.5 - 2.0 mmol/L   proBrain Natriuretic Peptide, NT    Collection Time: 02/03/21  1:10 AM   Result Value Ref Range    NT-proBNP 03295 (H) 0 - 125 pg/mL   MAGNESIUM    Collection Time: 02/03/21  1:10 AM   Result Value Ref Range    Magnesium 1.8 1.5 - 2.5 mg/dL   TROPONIN    Collection Time: 02/03/21  1:10 AM   Result Value Ref Range    Troponin T 102 (H) 6 - 19 ng/L   APTT    Collection Time: 02/03/21  1:10 AM   Result Value Ref Range    APTT 43.1 (H) 24.7 - 36.0 sec   PROTHROMBIN TIME    Collection Time: 02/03/21  1:10 AM   Result Value Ref Range    PT 28.4 (H) 12.0 - 14.6 sec    INR 2.57 (H) 0.87 - 1.13   ESTIMATED GFR    Collection Time: 02/03/21  1:10 AM   Result Value Ref Range    GFR If African American 30 (A) >60 mL/min/1.73 m 2    GFR If Non African American 25 (A) >60 mL/min/1.73 m 2   CREATINE  KINASE    Collection Time: 02/03/21  1:10 AM   Result Value Ref Range    CPK Total 111 0 - 154 U/L   ACCU-CHEK GLUCOSE    Collection Time: 02/03/21  1:21 AM   Result Value Ref Range    Glucose - Accu-Ck 93 65 - 99 mg/dL   COV-2, FLU A/B, AND RSV BY PCR (2-4 HOURS CEPHEID): Collect NP swab in VTM    Collection Time: 02/03/21  1:30 AM    Specimen: Respirate   Result Value Ref Range    Influenza virus A RNA Negative Negative    Influenza virus B, PCR Negative Negative    RSV, PCR Negative Negative    SARS-CoV-2 by PCR NotDetected     SARS-CoV-2 Source NP Swab    ACCU-CHEK GLUCOSE    Collection Time: 02/03/21  1:50 AM   Result Value Ref Range    Glucose - Accu-Ck 87 65 - 99 mg/dL   ACCU-CHEK GLUCOSE    Collection Time: 02/03/21  3:11 AM   Result Value Ref Range    Glucose - Accu-Ck 86 65 - 99 mg/dL   EC-ECHOCARDIOGRAM LTD W/O CONT    Collection Time: 02/03/21  3:11 AM   Result Value Ref Range    Eject.Frac. MOD 4C 25.97     Left Ventrical Ejection Fraction 55    LACTIC ACID    Collection Time: 02/03/21  5:15 AM   Result Value Ref Range    Lactic Acid 8.8 (HH) 0.5 - 2.0 mmol/L   Prothrombin time (INR)    Collection Time: 02/03/21  5:15 AM   Result Value Ref Range    PT 28.0 (H) 12.0 - 14.6 sec    INR 2.53 (H) 0.87 - 1.13       Imaging/Procedures Review:    Chest Xray:  Reviewed    EKG:   Atrial tachycardia with 2:1 block    ECHO:  Per HPI    MDM (Assessment and Plan):     Active Hospital Problems    Diagnosis   • RUSH (acute kidney injury) (HCC) [N17.9]     Priority: High   • Mitral stenosis [I05.0]     Priority: High   • Hypoglycemia [E16.2]   • Hyponatremia [E87.1]   • Bilirubinemia [E80.6]   • AMS (altered mental status) [R41.82]   • CHF, acute on chronic (HCC) [I50.9]     41 yo F with severe PH from severe MS and acute renal failure from type 1 cardio-renal syndrome.  She needs a lower HR to increase her diastolic filling times.  She is NPO for cardioversion.  I have pre-loaded her with digoxin one time to assist in  slowing her HR.  She also needs lasix to decrease her RV filling pressures.  I will start her on BIPAP with Flolan for her PH.  She does not have insurance and therefore is not a good candidate for transfer.    1. Atrial tachycardia    - dig load    - NPO    - cardioversion today    - start Heparin    2. MS, severe    - slow heart rate to allow for better left sided filling    3. Pulm Hypertension, severe    - start Flolan    4. Cardiogenic shock    - trend lactates    - avoid propofol    - cardioversion per above    - start Bipap    5. RUSH    - lasix 40 IV now    6. Transaminitis    - hepatic congestion 2/2 to severe MS, PH    7. Elevated INR    - due to hepatic congestion      This patient is critically ill with a life threatening illness as noted above requiring my direct presence, involvement and maximal preparedness. I have personally spend 60 minutes providing critical care to this patient. Time spend on critical care excludes time spend on procedures. Critical care delivered included complex management of hemodynamics in cardiogenic shock complicated by PH with severe MS.

## 2021-02-03 NOTE — PROGRESS NOTES
Patient admit from ER in cardiogenic shock. BP soft, HR tachy in 120's. Hypothermic, bare hugger on. Patient given 2L warm LR in ER as suspected to be in cold sepsis on admission. Has hx of MV stenosis, awaiting MVR next month. Stat echo at bedside showed EF 50%. Hx of Covid in Dec, tested (-) influenza A, B, RSV, Covid this admission. Needs cards consult.   Spoke with Dr Molina regarding patient HR, states okay with HR of 120 and to monitor. Declines need for heck at this time. Full assessment per flowsheets. WCTM.

## 2021-02-03 NOTE — ASSESSMENT & PLAN NOTE
S/P MVR with 29 mm Medtronic Mosaic porcine valve and repair of the tricuspid valve with 38 mm Chavez flexible annuloplasty band on 2/8 by Dr. Webber  Left sided Maze procedure with MARK ligation on 2/8 by Dr. Webber  Continue Coumadin

## 2021-02-03 NOTE — ASSESSMENT & PLAN NOTE
Telemetry monitoring   Serial troponin/EKG   Official Echocardiogram transthoracic report   BIPAP  CT surgery consult in am

## 2021-02-03 NOTE — ED PROVIDER NOTES
ED Provider Note    CHIEF COMPLAINT  Chief Complaint   Patient presents with   • Hypotension       HPI  Carolyn Hauser is a 40 y.o. female who presents for altered level of consciousness and weakness.  Patient's family apparently called EMS after finding her down on the floor of her room.  They do not know how long she was there however the patient states it was 5 to 6 hours.  Patient was noted to have a blood sugar in the 60s by EMS and was given oral glucose in route.  She was given IV glucose immediately on arrival to the ED and is able to note that she has had a burning sensation in her epigastrium which has since spread to her entire abdomen.  She denies the use of insulin and does not have a history of diabetes.  She does have a history of CHF likely due to mitral valve stenosis which is scheduled to be replaced next month.  Patient recently moved from [facility] last November and was diagnosed with Covid in December which is incidentally when her mitral valve stenosis and tricuspid regurg were identified.    REVIEW OF SYSTEMS  Constitutional: No recent fevers or chills  Skin: No rashes  HEENT: No sore throat, runny nose, sores, trouble swallowing, trouble speaking.  Neck: No neck pain, stiffness, or masses.  Chest: No pain or rashes  Pulm: Shortness of breath with exertion.  No current cough, wheezing, stridor, or pain with inspiration/expiration  Gastrointestinal: No nausea, vomiting, or diarrhea  Genitourinary: No dysuria or hematuria  Musculoskeletal: No pain to extremities.  Generalized weakness noted.  Neurologic: No sensory or motor changes.  Some disorientation prior to arrival.  Heme: No bleeding or bruising problems.   Immuno: No hx of recurrent infections      PAST MEDICAL HISTORY   has a past medical history of Heart valve disease, Moderate to severe pulmonary hypertension (HCC), and Severe tricuspid regurgitation.CHF with mitral valve stenosis    SOCIAL HISTORY  Social History     Tobacco Use  "  • Smoking status: Never Smoker   • Smokeless tobacco: Former User     Types: Chew   Substance and Sexual Activity   • Alcohol use: Not Currently     Frequency: 2-3 times a week     Drinks per session: 1 or 2   • Drug use: Not Currently   • Sexual activity: Not on file       SURGICAL HISTORY  patient denies any surgical history    CURRENT MEDICATIONS  Home Medications    **Home medications have not yet been reviewed for this encounter**         ALLERGIES  No Known Allergies    PHYSICAL EXAM  VITAL SIGNS: BP (!) 99/61   Pulse (!) 119   Temp (!) 35.7 °C (96.3 °F)   Resp (!) 23   Ht 1.626 m (5' 4\")   Wt 63.5 kg (140 lb)   SpO2 98%   BMI 24.03 kg/m²    Gen: Appears groggy but otherwise nondistressed.  Somewhat disheveled  HEENT: No signs of trauma, Bilateral external ears normal, Nose normal. Conjunctiva normal, Non-icteric.   Neck:  No tenderness, Supple, No masses  Lymphatic: No cervical lymphadenopathy noted.   Cardiovascular: Cardia with systolic murmur noted.  Capillary around 5 seconds to all extremities, 1+ distal pulses.  Thorax & Lungs: Normal breath sounds, No respiratory distress, No wheezing bilateral chest rise  Abdomen: Bowel sounds normal, Soft, No tenderness, No masses, No pulsatile masses. No Guarding or rebound  Skin: Warm, Dry, No erythema   Extremities: Intact distal pulses, diffuse 4+ pitting edema to lower extremities  Neurologic: Groggy but oriented to person, place, time, and situation.  Able to move all extremities albeit weakly.  Psychiatric: Affect restricted    LABS  Results for orders placed or performed during the hospital encounter of 02/03/21   COV-2, FLU A/B, AND RSV BY PCR (2-4 HOURS CEPHEID): Collect NP swab in VTM    Specimen: Respirate   Result Value Ref Range    Influenza virus A RNA Negative Negative    Influenza virus B, PCR Negative Negative    RSV, PCR Negative Negative    SARS-CoV-2 by PCR NotDetected     SARS-CoV-2 Source NP Swab    HCG QUAL SERUM   Result Value Ref " Range    Beta-Hcg Qualitative Serum Negative Negative   CBC WITH DIFFERENTIAL   Result Value Ref Range    WBC 11.2 (H) 4.8 - 10.8 K/uL    RBC 3.77 (L) 4.20 - 5.40 M/uL    Hemoglobin 10.5 (L) 12.0 - 16.0 g/dL    Hematocrit 34.2 (L) 37.0 - 47.0 %    MCV 90.7 81.4 - 97.8 fL    MCH 27.9 27.0 - 33.0 pg    MCHC 30.7 (L) 33.6 - 35.0 g/dL    RDW 61.8 (H) 35.9 - 50.0 fL    Platelet Count 239 164 - 446 K/uL    MPV 11.3 9.0 - 12.9 fL    Neutrophils-Polys 82.20 (H) 44.00 - 72.00 %    Lymphocytes 11.90 (L) 22.00 - 41.00 %    Monocytes 4.60 0.00 - 13.40 %    Eosinophils 0.20 0.00 - 6.90 %    Basophils 0.40 0.00 - 1.80 %    Immature Granulocytes 0.70 0.00 - 0.90 %    Nucleated RBC 0.00 /100 WBC    Neutrophils (Absolute) 9.24 (H) 2.00 - 7.15 K/uL    Lymphs (Absolute) 1.34 1.00 - 4.80 K/uL    Monos (Absolute) 0.52 0.00 - 0.85 K/uL    Eos (Absolute) 0.02 0.00 - 0.51 K/uL    Baso (Absolute) 0.04 0.00 - 0.12 K/uL    Immature Granulocytes (abs) 0.08 0.00 - 0.11 K/uL    NRBC (Absolute) 0.00 K/uL   COMP METABOLIC PANEL   Result Value Ref Range    Sodium 122 (L) 135 - 145 mmol/L    Potassium 4.1 3.6 - 5.5 mmol/L    Chloride 86 (L) 96 - 112 mmol/L    Co2 9 (LL) 20 - 33 mmol/L    Anion Gap 27.0 (H) 7.0 - 16.0    Glucose 132 (H) 65 - 99 mg/dL    Bun 25 (H) 8 - 22 mg/dL    Creatinine 2.17 (H) 0.50 - 1.40 mg/dL    Calcium 8.8 8.5 - 10.5 mg/dL    AST(SGOT) 32 12 - 45 U/L    ALT(SGPT) 11 2 - 50 U/L    Alkaline Phosphatase 124 (H) 30 - 99 U/L    Total Bilirubin 4.1 (H) 0.1 - 1.5 mg/dL    Albumin 2.8 (L) 3.2 - 4.9 g/dL    Total Protein 8.3 (H) 6.0 - 8.2 g/dL    Globulin 5.5 (H) 1.9 - 3.5 g/dL    A-G Ratio 0.5 g/dL   LACTIC ACID   Result Value Ref Range    Lactic Acid 12.3 (HH) 0.5 - 2.0 mmol/L   proBrain Natriuretic Peptide, NT   Result Value Ref Range    NT-proBNP 52616 (H) 0 - 125 pg/mL   MAGNESIUM   Result Value Ref Range    Magnesium 1.8 1.5 - 2.5 mg/dL   TROPONIN   Result Value Ref Range    Troponin T 102 (H) 6 - 19 ng/L   APTT   Result Value  Ref Range    APTT 43.1 (H) 24.7 - 36.0 sec   PROTHROMBIN TIME   Result Value Ref Range    PT 28.4 (H) 12.0 - 14.6 sec    INR 2.57 (H) 0.87 - 1.13   ESTIMATED GFR   Result Value Ref Range    GFR If African American 30 (A) >60 mL/min/1.73 m 2    GFR If Non African American 25 (A) >60 mL/min/1.73 m 2   ACCU-CHEK GLUCOSE   Result Value Ref Range    Glucose - Accu-Ck 24 (LL) 65 - 99 mg/dL   ACCU-CHEK GLUCOSE   Result Value Ref Range    Glucose - Accu-Ck 93 65 - 99 mg/dL   ACCU-CHEK GLUCOSE   Result Value Ref Range    Glucose - Accu-Ck 87 65 - 99 mg/dL   ACCU-CHEK GLUCOSE   Result Value Ref Range    Glucose - Accu-Ck 86 65 - 99 mg/dL   EKG   Result Value Ref Range    Report       [facility] Emergency Dept.    Test Date:  2021  Pt Name:    SARAH CORMIER              Department: ER  MRN:        [id number]                      Room:       [facility]  Gender:     Female                       Technician: [id number]  :        1980                   Requested By:NAT RENEE  Order #:    [id number]                    Reading MD:    Measurements  Intervals                                Axis  Rate:       113                          P:          257  MD:         168                          QRS:        116  QRSD:       96                           T:          94  QT:         380  QTc:        521    Interpretive Statements  ECTOPIC ATRIAL TACHYCARDIA  RIGHT VENTRICULAR HYPERTROPHY  REPOL ABNRM SUGGESTS ISCHEMIA, DIFFUSE LEADS  PROLONGED QT INTERVAL  Compared to ECG 2021 13:31:33  Right ventricular hypertrophy now present  Early repolarization now present  Possible ischemia now present  Prolonged QT interval now present  Sinus tachycardia n o longer present  Atrial abnormality no longer present  T-wave abnormality no longer present         RADIOLOGY  EC-ECHOCARDIOGRAM LTD W/O CONT         DX-CHEST-PORTABLE (1 VIEW)   Final Result      1.  Hazy diffuse bilateral pulmonary opacities which could be  related to pulmonary edema and/or infection.   2.  Marked cardiac mediastinal silhouette enlargement is again noted.   3.  Small left pleural effusion appears mildly improved.      US-RUQ    (Results Pending)     Critical Care Note  Upon my evaluation, this patient had high probability of imminent and life-threatening deterioration due to sepsis with hypotension and heartfailure , which required my direct attention, intervention, and personal management. I personally provided 35 minutes of critical care time exclusive of time spent on separately billable procedures. Time includes review of laboratory data, radiology results, discussion with consultants, and monitoring for potential decompensation.     HYDRATION: Based on the patient's presentation of Sepsis the patient was given IV fluids. IV Hydration was used because oral hydration was not adequate alone. Upon recheck following hydration, the patient was unchanged.    COURSE & MEDICAL DECISION MAKING  Patient arrives for evaluation of altered level of consciousness which appears to be related to hypoglycemia possibly due to sepsis.  Patient appears hypotensive and is tachycardic.  She has a prominent systolic murmur consistent with her mitral valve    0157  Evaluated patient at bedside, with an appropriate blood pressure cuff patient is in the 80s over 50s but awake and talking.  Discussed the case with cardiologist who gave the okay to perform a bedside echo to help guide treatment.  If the patient is septic it is likely her source is valve-related in the form of endocarditis.  Will likely need to place central line but her map is holding at 65 for now so I will await echo results.  Patient has been given 2 L of fluids thus far and we will hold off on giving any more.  Notable that she is preload dependent due to her mitral valve stenosis however he did not want a risk any more fluids out of concern for worsening respiratory status.    0240  Case discussed  briefly with the critical care specialist, Dr. Molina, who will evaluate the patient at bedside.    3:45 AM  Echo was completed showing a decrease in the ejection fraction.  This was briefly discussed with the cardiologist who suggested that the patient will likely need cardiothoracic surgery consultation in the morning to determine whether her surgery needs to be moved forward.  Patient was also discussed with the hospitalist will admit primarily.  Patient experienced no deterioration in her hemodynamics and remained respiratorily and neurologically stable while in the ED.  She was given prophylactic antibiotics due to meeting criteria for sepsis but it is unclear if there is a source at this point.      FINAL IMPRESSION  1. Mitral valve stenosis, severe    2. Hypotension, unspecified hypotension type    3. Coagulopathy (HCC)    4. Hyponatremia    5. Hypoglycemia    6. RUSH (acute kidney injury) ([facility])    7. Acute on chronic congestive heart failure, unspecified heart failure type (HCC)        Electronically signed by: Issac Jackson M.D., 2/3/2021 12:58 AM

## 2021-02-03 NOTE — ASSESSMENT & PLAN NOTE
Congestive  Improved with diuresis  RUQ ultrasound reassuring  Amylase/lipase normal    Plan:  Daily monitoring

## 2021-02-04 PROBLEM — I48.91 ATRIAL FIBRILLATION (HCC): Status: ACTIVE | Noted: 2021-01-01

## 2021-02-04 NOTE — PROGRESS NOTES
Patient admit cardiogenic shock. Needs MVR, awaiting surgery. Cards and intensivist team on board. Per day RN, CTS team called during the day to ask if patient stable condition. Has not consulted at bedside yet this admission. Per notes, patient was scheduled to have MVR with Dr Felder next month. Patinet on Amiodarone gtt, converted to NSR today. Heparin gtt started, next Xa at 0400. Replacing lytes. Diuresing better overnight through heck with lasix. Patient remains NPO after MN in case of surgery in am.

## 2021-02-04 NOTE — PROGRESS NOTES
Patient up to bedside commode and then chair. OK to ambulate per Dr. Case. Rhythm change noted. Dr. Case notified and bedside at 1530. Ordered to send BMP/mag early. Labs sent. Patient denies any chest pain, SOB or lightheadedness with ambulation.

## 2021-02-04 NOTE — CARE PLAN
Problem: Communication  Goal: The ability to communicate needs accurately and effectively will improve  Outcome: PROGRESSING AS EXPECTED     Problem: Safety  Goal: Will remain free from injury  Outcome: PROGRESSING AS EXPECTED  Goal: Will remain free from falls  Outcome: PROGRESSING AS EXPECTED     Problem: Infection  Goal: Will remain free from infection  Outcome: PROGRESSING AS EXPECTED     Problem: Venous Thromboembolism (VTW)/Deep Vein Thrombosis (DVT) Prevention:  Goal: Patient will participate in Venous Thrombosis (VTE)/Deep Vein Thrombosis (DVT)Prevention Measures  Outcome: PROGRESSING AS EXPECTED     Problem: Bowel/Gastric:  Goal: Normal bowel function is maintained or improved  Outcome: PROGRESSING AS EXPECTED  Goal: Will not experience complications related to bowel motility  Outcome: PROGRESSING AS EXPECTED     Problem: Knowledge Deficit  Goal: Knowledge of disease process/condition, treatment plan, diagnostic tests, and medications will improve  Outcome: PROGRESSING AS EXPECTED  Goal: Knowledge of the prescribed therapeutic regimen will improve  Outcome: PROGRESSING AS EXPECTED     Problem: Discharge Barriers/Planning  Goal: Patient's continuum of care needs will be met  Outcome: PROGRESSING AS EXPECTED     Problem: Respiratory:  Goal: Respiratory status will improve  Outcome: PROGRESSING AS EXPECTED     Problem: Fluid Volume:  Goal: Will maintain balanced intake and output  Outcome: PROGRESSING AS EXPECTED     Problem: Mobility  Goal: Risk for activity intolerance will decrease  Outcome: PROGRESSING AS EXPECTED     Problem: Skin Integrity  Goal: Risk for impaired skin integrity will decrease  Outcome: PROGRESSING AS EXPECTED

## 2021-02-04 NOTE — PROGRESS NOTES
Cardiology Follow Up Progress Note    Date of Service  2/4/2021    Attending Physician  Pino aCse M.D.    CODY Hauser is a 40 y.o. female admitted 2/3/2021 with PMH of severe rheumatic mitral stenosis secondary pulmonary hypertension and right heart failure and recent Covid 19 infection pending mitral valve surgery presents with cardiogenic shock, paroxysmal atrial flutter, RUSH and lactic acidosis.  Converted to NSR.    Interim Events  2/3: /66.  Pulse 92.  Sinus.  Tired somewhat short of breath but feeling better.  No chest pain.  2/4: /58.  Pulse 62.  Sinus.  Feels better.    Review of Systems  Review of Systems   Constitutional: Positive for fatigue.   Respiratory: Negative for chest tightness and shortness of breath.    Cardiovascular: Negative for palpitations.   Neurological: Positive for weakness. Negative for dizziness and light-headedness.       Vital signs in last 24 hours  Temp:  [36.1 °C (97 °F)-36.5 °C (97.7 °F)] 36.2 °C (97.1 °F)  Pulse:  [65-96] 65  Resp:  [15-27] 18  BP: ()/(51-67) 93/63  SpO2:  [85 %-100 %] 100 %    Physical Exam  Physical Exam  Constitutional:       General: She is not in acute distress.     Comments:  Looks better.   HENT:      Head: Normocephalic.   Eyes:      General: No scleral icterus.     Extraocular Movements: Extraocular movements intact.   Neck:      Vascular: JVD present.      Comments: Markedly elevated JVP  Cardiovascular:      Rate and Rhythm: Normal rate and regular rhythm.      Heart sounds: S1 normal and S2 normal. Murmur present. No friction rub. No gallop.    Pulmonary:      Effort: Pulmonary effort is normal.      Breath sounds: Normal breath sounds. No wheezing, rhonchi or rales.   Musculoskeletal:      Right lower leg: Edema present.      Left lower leg: Edema present.   Skin:     General: Skin is warm and dry.   Neurological:      Mental Status: She is oriented to person, place, and time.   Psychiatric:         Behavior:  Behavior normal.         Lab Review  Lab Results   Component Value Date/Time    WBC 9.6 02/04/2021 04:44 AM    RBC 3.48 (L) 02/04/2021 04:44 AM    HEMOGLOBIN 9.4 (L) 02/04/2021 04:44 AM    HEMATOCRIT 29.2 (L) 02/04/2021 04:44 AM    MCV 83.9 02/04/2021 04:44 AM    MCH 27.0 02/04/2021 04:44 AM    MCHC 32.2 (L) 02/04/2021 04:44 AM    MPV 10.3 02/04/2021 04:44 AM      Lab Results   Component Value Date/Time    SODIUM 125 (L) 02/04/2021 04:44 AM    POTASSIUM 3.7 02/04/2021 04:44 AM    CHLORIDE 93 (L) 02/04/2021 04:44 AM    CO2 21 02/04/2021 04:44 AM    GLUCOSE 99 02/04/2021 04:44 AM    BUN 40 (H) 02/04/2021 04:44 AM    CREATININE 2.21 (H) 02/04/2021 04:44 AM      Lab Results   Component Value Date/Time    ASTSGOT 38 02/04/2021 04:44 AM    ALTSGPT 20 02/04/2021 04:44 AM     Lab Results   Component Value Date/Time    TROPONINT 102 (H) 02/03/2021 01:10 AM       Recent Labs     02/03/21  0110   NTPROBNP [id number]*       Cardiac Imaging and Procedures Review  EKG:  My personal interpretation of the EKG dated 2/3/2021 is normal sinus rhythm, rate 94.  Right ventricular hypertrophy.    Echocardiogram: 2/3/2021  Compared to the images of the prior study done 12/15/2020, the RV   appears larger.  Left ventricular ejection fraction is visually estimated to be 55%.  Abnormal septal motion consistent with right ventricular (RV) volume   overload and/or elevated RV end-diastolic pressure.  Severely dilated right ventricle.  Reduced right ventricular systolic   function.  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  Tong score calcualted at 14.  Mean transvalvular gradient is 17 mmHg at a heart rate of 119 BPM.    Imaging  Chest X-Ray: 2/3/2021  1.  New left IJV central line tip projects in satisfactory position. No pneumothorax.  2.  Cardiomegaly with diffuse interstitial edema or pneumonia pattern.    Assessment  1.  Cardiogenic shock, biventricular failure, valvular due to severe mitral stenosis.  2.  Pulmonary hypertension.  3.   Paroxysmal atrial flutter.  4.  Lactic acidosis.  5.  RUSH.  6.  Hyponatremia.  7.  Hypoglycemia.  8.  Hyperbilirubinemia.    Recommendation Discussion  1.  Clinically improved with good diuresis.  2.  IV amiodarone to maintain NSR, no bolus.  3.  Cautious IV diuresis, avoid hypotension.  4.  Optimize electrolytes K>4.0,Mg>2.0.  5.  Needs definitive therapy for mitral stenosis; CT surgery consultation still pending; depending on assessment will proceed with MVR or will need transfer to tertiary center for MVR vs mitral valvuloplasty    Thank you for allowing me to participate in the care of this patient.    Please contact me with any questions.    Shahzad So M.D.   Cardiologist, [facility]  [phone]

## 2021-02-04 NOTE — ASSESSMENT & PLAN NOTE
Severe pulmonary hypertension with RVSP at least 90 mmHg  Force diuresis with furosemide  Begin trial of Flolan

## 2021-02-04 NOTE — DISCHARGE PLANNING
Care Transition Team Assessment     Patient recently moved from [facility] to the [facility] to join her spouse. She is not a [place] citizen. I have a sent an email to [facility] requesting patient be screened for possible Emergency Medicaid.    Possible difficult discharge depending on patient's needs at time of discharge.      Information Source  Orientation : Oriented x 4  Information Given By: (chart review during COVID Pandemic)  Who is responsible for making decisions for patient? : Patient    Readmission Evaluation  Is this a readmission?: No    Elopement Risk  Legal Hold: No  Ambulatory or Self Mobile in Wheelchair: No-Not an Elopement Risk  Elopement Risk: Not at Risk for Elopement    Interdisciplinary Discharge Planning  Does Admitting Nurse Feel This Could be a Complex Discharge?: Yes  Primary Care Physician: none  Lives with - Patient's Self Care Capacity: Spouse  Support Systems: Spouse / Significant Other  Housing / Facility: Unable To Determine At This Time  Able to Return to Previous ADL's: Future Time w/Therapy  Assistance Needed: Yes  Durable Medical Equipment: Not Applicable    Discharge Preparedness  What are your discharge supports?: Spouse  Prior Functional Level: Independent with Activities of Daily Living  Difficulity with ADLs: None    Functional Assesment  Prior Functional Level: Independent with Activities of Daily Living    Finances  Financial Barriers to Discharge: Yes  Prescription Coverage: No              Advance Directive  Advance Directive?: None    Domestic Abuse  Have you ever been the victim of abuse or violence?: No         Discharge Risks or Barriers  Discharge risks or barriers?: No PCP, Uninsured / underinsured, Complex medical needs  Patient risk factors: No PCP, Uninsured or underinsured    Anticipated Discharge Information  Discharge Disposition: Still a Patient (30)

## 2021-02-04 NOTE — HEART FAILURE PROGRAM
Cardiovascular Nurse Navigator () Advanced Heart Failure Program Inpatient Progress Note:     Patient with right heart failure, severe mitral stenosis awaiting CTS for MVR.    Patient is currently admitted to ICU and is diagnosed with cardiogenic shock, so this not the appropriate time to review for GDMT.    Please see below for required HF measures once patient stabilizes.     Daily Nurse: please begin to fill out the HF checklist (pink sheet in hard chart) and use it to guide your daily care.    Discharge Nurse: please ensure completeness of the HF checklist (pink sheet in hard chart) and have it co-signed by the charge RN before the patient leaves the hospital.     Thank you, Trish Cardio RN Navigator [phone]    HF Measures:  1. Documentation of LV systolic function (echo or cath) PTA, during this hospitalization, or plan to assess post discharge or reason for not assessing documented  2. Documentation of fluid intake and urine output every nursing shift  3. 2 hour post diuretic assessment documented 2 hours after diuretic given  4. HF Patient Education using the Living Well With Heart Failure Booklet and Symptom Tracker documented every nursing shift  5. Nutrition consult for diet education  6. Daily weights (one weight documented every 24 hours) on a standing scale unless standing is contraindicated in which case bed scale can be used - have patient write weight on symptom tracker  7. For LVEF less than or equal to 40%, ACE-I, ARNI or ARB prescribed at discharge   8. For LVEF less than or equal to 40%, an Evidence Based Beta Blocker (bisoprolol, carvedilol, toprol xl) must be prescribed at discharge  9. For LVEF less than or equal to 35% aldosterone blockade prescribed at discharge  10. The combination of hydralazine and isosorbide dinitrate is recommended to reduce morbidity and mortality for patients self-described  Americans with NYHA class III-IV HFrEF (EF 40% or less), receiving optimal  therapy with ACE inhibitors and beta blockers, unless contraindicated (Class I, ELVIN: A).  11. If a HF patient is diabetic or is newly diagnosed with DM: prescribed diabetes treatment at discharge in the form of glycemic control (diet or anti-hyperglycemic medication) or f/u appointment for diabetes management scheduled at discharge.  12. If a HF patient has diabetes: prescribed lipid lowering medication at discharge  13. Documented smoking cessation advice or counseling  14. If a HF patient has a-fib: anticoagulation is prescribed upon discharge or contraindication is documented  15. Screening for and administering immunizations as long as no contraindications: Pneumonia (regardless of age) and Influenza  16. Written discharge instructions include:  ? Daily weights  ? Record weight on tracker  ? Bring tracker to appointments  ? Call MD for weight gain of 3lb /day or 5lb/week  ? HF medication teaching  ? Low sodium diet  ? Follow up appointment within seven calendar days of d/c must include: date, time and location  ? Activity  ? Worsening symptoms    What if any of the above HF measures are contraindicated?  ? Request that the discharging provider document the medication/intervention and the contraindication specifically in a progress note  ? For example: “no CHF meds due to hypotension” is not enough. It needs to say: “No ACE-I, ARNI, ARB due to hypotension”; “No Beta Blockade due to bradycardia”…         HF Measures:  1. Documentation of LV systolic function (echo or cath) PTA, during this hospitalization, or plan to assess post discharge or reason for not assessing documented  2. Documentation of fluid intake and urine output every nursing shift  3. 2 hour post diuretic assessment documented 2 hours after diuretic given  4. HF Patient Education using the Living Well With Heart Failure Booklet and Symptom Tracker documented every nursing shift  5. Nutrition consult for diet education  6. Daily weights (one weight  documented every 24 hours) on a standing scale unless standing is contraindicated in which case bed scale can be used - have patient write weight on symptom tracker  7. For LVEF less than or equal to 40%, ACE-I, ARNI or ARB prescribed at discharge   8. For LVEF less than or equal to 40%, an Evidence Based Beta Blocker (bisoprolol, carvedilol, toprol xl) must be prescribed at discharge  9. For LVEF less than or equal to 35% aldosterone blockade prescribed at discharge  10. The combination of hydralazine and isosorbide dinitrate is recommended to reduce morbidity and mortality for patients self-described  Americans with NYHA class III-IV HFrEF (EF 40% or less), receiving optimal therapy with ACE inhibitors and beta blockers, unless contraindicated (Class I, ELVIN: A).  11. If a HF patient is diabetic or is newly diagnosed with DM: prescribed diabetes treatment at discharge in the form of glycemic control (diet or anti-hyperglycemic medication) or f/u appointment for diabetes management scheduled at discharge.  12. If a HF patient has diabetes: prescribed lipid lowering medication at discharge  13. Documented smoking cessation advice or counseling  14. If a HF patient has a-fib: anticoagulation is prescribed upon discharge or contraindication is documented  15. Screening for and administering immunizations as long as no contraindications: Pneumonia (regardless of age) and Influenza  16. Written discharge instructions include:  ? Daily weights  ? Record weight on tracker  ? Bring tracker to appointments  ? Call MD for weight gain of 3lb /day or 5lb/week  ? HF medication teaching  ? Low sodium diet  ? Follow up appointment within seven calendar days of d/c must include: date, time and location  ? Activity  ? Worsening symptoms    What if any of the above HF measures are contraindicated?  ? Request that the discharging provider document the medication/intervention and the contraindication specifically in a progress  note  ? For example: “no CHF meds due to hypotension” is not enough. It needs to say: “No ACE-I, ARNI, ARB due to hypotension”; “No Beta Blockade due to bradycardia”…

## 2021-02-04 NOTE — CONSULTS
REFERRING PHYSICIAN: Shahzad Hanley MD.    CONSULTING PHYSICIAN: Alphonse Webber MD, FACS.    CHIEF COMPLAINT: Shortness of breath, lower extremity edema.    HISTORY OF PRESENT ILLNESS: The patient is a 40 y.o. female with recent history of Covid 19. She traveled here from [facility] at the end of November and was experiencing worsening fatigue, shortness of breath and lower extremity edema. She presented to the hospital where she tested positive for Covid 19 and worked up for heart failure. She was found to have severe mitral stenosis and severe tricuspid regurgitation. She was discharged home to recover from covid 19.  She was seen in our office as an outpatient and scheduled for surgery on March 3.  She presented to ER 2/3/2021 after being found altered in her home.  EMS notes a low BGL and oral glucose was administered. In the ER she was given prophylactic antibiotics due to meeting criteria for sepsis.  Further work-up revealed decreased EF.    PAST MEDICAL HISTORY:   Past Medical History:   Diagnosis Date   • Heart valve disease    • Moderate to severe pulmonary hypertension (HCC)    • Severe tricuspid regurgitation        PAST SURGICAL HISTORY:   Reviewed.  No pertinent cardiovascular or chest surgery.    FAMILY HISTORY:   Family History   Problem Relation Age of Onset   • No Known Problems Mother    • Heart Disease Father    • Hypertension Maternal Uncle    • Heart Disease Maternal Uncle    • Hypertension Paternal Uncle    • Heart Disease Paternal Uncle         SOCIAL HISTORY:   Social History     Socioeconomic History   • Marital status:      Spouse name: Not on file   • Number of children: 3   • Years of education: Not on file   • Highest education level: Not on file   Occupational History   • Not on file   Social Needs   • Financial resource strain: Somewhat hard   • Food insecurity     Worry: Sometimes true     Inability: Sometimes true   • Transportation needs     Medical: No      Non-medical: No   Tobacco Use   • Smoking status: Never Smoker   • Smokeless tobacco: Former User     Types: Chew   Substance and Sexual Activity   • Alcohol use: Not Currently     Frequency: 2-3 times a week     Drinks per session: 1 or 2   • Drug use: Not Currently   • Sexual activity: Not on file   Lifestyle   • Physical activity     Days per week: Not on file     Minutes per session: Not on file   • Stress: Not on file   Relationships   • Social connections     Talks on phone: Not on file     Gets together: Not on file     Attends [employer]ism service: Not on file     Active member of club or organization: Not on file     Attends meetings of clubs or organizations: Not on file     Relationship status: Not on file   • Intimate partner violence     Fear of current or ex partner: Not on file     Emotionally abused: Not on file     Physically abused: Not on file     Forced sexual activity: Not on file   Other Topics Concern   • Not on file   Social History Narrative   • Not on file       ALLERGIES:   No Known Allergies     CURRENT MEDICATIONS:     Current Facility-Administered Medications:   •  furosemide (LASIX) injection 40 mg, 40 mg, Intravenous, TID, Pino Case M.D., 40 mg at 02/04/21 1213  •  K+ Scale: Goal of 4.5, 1 Each, Intravenous, Q6HRS, Pino Case M.D., 1 Each at 02/04/21 1000  •  heparin injection 5,000 Units, 5,000 Units, Subcutaneous, Q8HRS, Pino Case M.D., 5,000 Units at 02/04/21 1358  •  trimethobenzamide (TIGAN) injection 200 mg, 200 mg, Intramuscular, Q6HRS PRN, Pino Case M.D.  •  amiodarone (NEXTERONE) 360 mg/200 mL infusion, 0.5-1 mg/min, Intravenous, Continuous, Pino Case M.D., Last Rate: 17 mL/hr at 02/04/21 0740, 0.5 mg/min at 02/04/21 0740  •  norepinephrine (Levophed) infusion 8 mg/250 mL (premix), 0-30 mcg/min, Intravenous, Continuous, Judie May M.D., Stopped at 02/03/21 2015     LABS REVIEWED:  Lab Results   Component Value Date/Time    SODIUM 125  (L) 02/04/2021 04:44 AM    POTASSIUM 3.7 02/04/2021 04:44 AM    CHLORIDE 93 (L) 02/04/2021 04:44 AM    CO2 21 02/04/2021 04:44 AM    GLUCOSE 99 02/04/2021 04:44 AM    BUN 40 (H) 02/04/2021 04:44 AM    CREATININE 2.21 (H) 02/04/2021 04:44 AM      Lab Results   Component Value Date/Time    PROTHROMBTM 25.4 (H) 02/04/2021 07:53 AM    INR 2.23 (H) 02/04/2021 07:53 AM      Lab Results   Component Value Date/Time    WBC 9.6 02/04/2021 04:44 AM    RBC 3.48 (L) 02/04/2021 04:44 AM    HEMOGLOBIN 9.4 (L) 02/04/2021 04:44 AM    HEMATOCRIT 29.2 (L) 02/04/2021 04:44 AM    MCV 83.9 02/04/2021 04:44 AM    MCH 27.0 02/04/2021 04:44 AM    MCHC 32.2 (L) 02/04/2021 04:44 AM    RDW 54.2 (H) 02/04/2021 04:44 AM    PLATELETCT 234 02/04/2021 04:44 AM    MPV 10.3 02/04/2021 04:44 AM    NEUTSPOLYS 76.60 (H) 02/04/2021 04:44 AM    LYMPHOCYTES 12.30 (L) 02/04/2021 04:44 AM    MONOCYTES 8.10 02/04/2021 04:44 AM    EOSINOPHILS 2.00 02/04/2021 04:44 AM    BASOPHILS 0.50 02/04/2021 04:44 AM    ANISOCYTOSIS 2+ (A) 12/14/2020 03:57 PM        IMAGING REVIEWED AND INTERPRETED:    ECHOCARDIOGRAM: 2/3/2021  Compared to the images of the prior study done 12/15/2020, the RV   appears larger.  Left ventricular ejection fraction is visually estimated to be 55%.  Abnormal septal motion consistent with right ventricular (RV) volume   overload and/or elevated RV end-diastolic pressure.  Severely dilated right ventricle.  Reduced right ventricular systolic   function.  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  Tong score calcualted at 14.  Mean transvalvular gradient is 17 mmHg at a heart rate of 119 BPM.     ANGIOGRAM:   None available.     REVIEW OF SYSTEMS:   Review of Systems   Constitutional: Positive for malaise/fatigue. Negative for chills, fever and weight loss.   HENT: Negative for ear pain, nosebleeds and tinnitus.    Eyes: Negative for double vision, photophobia and pain.   Respiratory: Positive for shortness of breath. Negative for cough and  "hemoptysis.    Cardiovascular: Positive for leg swelling. Negative for chest pain, palpitations, orthopnea and PND.   Gastrointestinal: Negative for abdominal pain, blood in stool, nausea and vomiting.   Genitourinary: Negative for frequency, hematuria and urgency.   Musculoskeletal: Positive for joint pain.   Skin: Negative for rash.   Neurological: Negative for dizziness, tremors, speech change, focal weakness, seizures and headaches.   Endo/Heme/Allergies: Negative for polydipsia. Does not bruise/bleed easily.   Psychiatric/Behavioral: Negative for hallucinations and memory loss.       PHYSICAL EXAMINATION:  CONSTITUTIONAL:  /71   Pulse 75   Temp 36.2 °C (97.1 °F) (Temporal)   Resp (!) 22   Ht 1.626 m (5' 4\")   Wt 66.2 kg (145 lb 15.1 oz)   SpO2 97%.    Physical Exam   Constitutional: She is oriented to person, place, and time and well-developed, well-nourished, and in no distress.   HENT:   Head: Normocephalic and atraumatic.   Eyes: Pupils are equal, round, and reactive to light.   Neck: Normal range of motion. Neck supple. No JVD present.   Cardiovascular: Normal rate and intact distal pulses.   Murmur heard.  Pulmonary/Chest: Effort normal and breath sounds normal. No respiratory distress. She has no wheezes.   Musculoskeletal: Normal range of motion.         General: Edema present.   Neurological: She is alert and oriented to person, place, and time. Gait normal.   Skin: Skin is warm and dry. No erythema.   Psychiatric: Affect normal.       IMPRESSION:  Severe symptomatic mitral stenosis, severe tricuspid regurgitation, acute on chronic left ventricular systolic and diastolic failure, atrial fibrillation, severe pulmonary hypertension, acute kidney injury.      PLAN:  I recommend that she undergo mitral valve replacement, tricuspid valve repair, possible Maze procedure and intraoperative transesophageal echocardiography.    The procedure, its risks, benefits, potential complications and " alternative treatments were discussed with the patient in detail including the risks should she decide not to undergo my recommended treatment. All of her questions were answered to her satisfaction and she is willing to proceed with the operation. The risks include death, stroke,  infection: to include a rare bacterial infection related to the use of the heart/lung machine, ralph-operative myocardial infarction, dysrhythmias, diaphragmatic paralysis, chest wall paresthesia, tracheostomy, kidney or other organ failure, possible return to the operating room for bleeding, bleeding requiring transfusion with its attendant risks including AIDS or hepatitis, dehiscence of surgical incisions, respiratory complications including the need for prolonged ventilator support, Protamine or other drug reaction, peripheral neuropathy, loss of limb, and miscount of surgical items. The operative mortality risk is greater than 5%. The STS mortality risk score is 3.3% and the morbidity and mortality risk score is 35%. The scores were discussed with patient.  She is at particularly high risk for renal failure requiring hemodialysis.  Hopefully, her INR can be corrected prior to the operation.    The operation is tentatively scheduled for Monday, February 8, 2021 at 7:30 AM at [facility].    Findings and recommendations have been discussed with the patient’s cardiologist, RONNIE Hanley MD.  Thank you for this challenging consultation and participation in the patient’s care.  I will keep you apprised of all future developments.    Sincerely,    Alphonse Webber MD, FACS.    IAlphonse MD, FACS performed a substantial portion of the EM visit face-to-face with the same patient on the same date of service with, Herminia Carrasquillo PA-C. I was personally involved in reviewing and interpreting the films and conducted elements of the history and physical exam. I performed all of the medical decision making for the  patient.

## 2021-02-04 NOTE — PROGRESS NOTES
Cardiology Follow Up Progress Note    Date of Service  2/3/2021    Attending Physician  Pino Case M.D.    CODY Hauser is a 40 y.o. female admitted 2/3/2021 with PMH of severe rheumatic mitral stenosis secondary pulmonary hypertension and right heart failure and recent Covid 19 infection pending mitral valve surgery presents with cardiogenic shock, paroxysmal atrial flutter, RUSH and lactic acidosis.  Converted to NSR.    Interim Events  2/3: /66.  Pulse 92.  Sinus.  Tired somewhat short of breath but feeling better.  No chest pain.    Review of Systems  Review of Systems   Constitutional: Positive for fatigue.   Respiratory: Positive for shortness of breath. Negative for chest tightness.    Cardiovascular: Negative for palpitations.   Neurological: Positive for weakness. Negative for dizziness and light-headedness.       Vital signs in last 24 hours  Temp:  [35.6 °C (96 °F)-36.6 °C (97.8 °F)] 36.1 °C (97 °F)  Pulse:  [] 95  Resp:  [18-33] 20  BP: ()/(48-71) 83/54  SpO2:  [70 %-100 %] 99 %    Physical Exam  Physical Exam  Constitutional:       General: She is not in acute distress.     Comments: Sedated but arousable.   HENT:      Head: Normocephalic.   Eyes:      General: No scleral icterus.     Extraocular Movements: Extraocular movements intact.   Neck:      Vascular: JVD present.      Comments: Markedly elevated JVP  Cardiovascular:      Rate and Rhythm: Normal rate and regular rhythm.      Heart sounds: S1 normal and S2 normal. Murmur present. No friction rub. No gallop.    Pulmonary:      Effort: Pulmonary effort is normal.      Breath sounds: Normal breath sounds. No wheezing, rhonchi or rales.   Musculoskeletal:      Right lower leg: Edema present.      Left lower leg: Edema present.   Skin:     General: Skin is warm and dry.   Neurological:      Mental Status: She is oriented to person, place, and time.   Psychiatric:         Behavior: Behavior normal.         Lab  Review  Lab Results   Component Value Date/Time    WBC 11.2 (H) 02/03/2021 01:10 AM    RBC 3.77 (L) 02/03/2021 01:10 AM    HEMOGLOBIN 10.5 (L) 02/03/2021 01:10 AM    HEMATOCRIT 34.2 (L) 02/03/2021 01:10 AM    MCV 90.7 02/03/2021 01:10 AM    MCH 27.9 02/03/2021 01:10 AM    MCHC 30.7 (L) 02/03/2021 01:10 AM    MPV 11.3 02/03/2021 01:10 AM      Lab Results   Component Value Date/Time    SODIUM 122 (L) 02/03/2021 01:10 AM    POTASSIUM 4.1 02/03/2021 01:10 AM    CHLORIDE 86 (L) 02/03/2021 01:10 AM    CO2 9 (LL) 02/03/2021 01:10 AM    GLUCOSE 132 (H) 02/03/2021 01:10 AM    BUN 25 (H) 02/03/2021 01:10 AM    CREATININE 2.17 (H) 02/03/2021 01:10 AM      Lab Results   Component Value Date/Time    ASTSGOT 32 02/03/2021 01:10 AM    ALTSGPT 11 02/03/2021 01:10 AM     Lab Results   Component Value Date/Time    TROPONINT 102 (H) 02/03/2021 01:10 AM       Recent Labs     02/03/21  0110   NTPROBNP [id number]*       Cardiac Imaging and Procedures Review  EKG:  My personal interpretation of the EKG dated 2/3/2021 is normal sinus rhythm, rate 94.  Right ventricular hypertrophy.    Echocardiogram: 2/3/2021  Compared to the images of the prior study done 12/15/2020, the RV   appears larger.  Left ventricular ejection fraction is visually estimated to be 55%.  Abnormal septal motion consistent with right ventricular (RV) volume   overload and/or elevated RV end-diastolic pressure.  Severely dilated right ventricle.  Reduced right ventricular systolic   function.  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  Tong score calcualted at 14.  Mean transvalvular gradient is 17 mmHg at a heart rate of 119 BPM.    Imaging  Chest X-Ray: 2/3/2021  1.  New left IJV central line tip projects in satisfactory position. No pneumothorax.  2.  Cardiomegaly with diffuse interstitial edema or pneumonia pattern.    Assessment  1.  Cardiogenic shock, biventricular failure, valvular due to severe mitral stenosis.  2.  Pulmonary hypertension.  3.  Paroxysmal atrial  flutter.  4.  Lactic acidosis.  5.  RUSH.  6.  Hyponatremia.  7.  Hypoglycemia.  8.  Hyperbilirubinemia.    Recommendation Discussion  1.  Was scheduled to be started on Flolan, but high risk of further hypotension/shock and need pulmonary edema with fixed cardiac output due to severe mitral stenosis.  2.  IV amiodarone to maintain NSR, no bolus.  3.  Cautious IV diuresis, avoid hypotension.  4.  Optimize electrolytes K>4.0,Mg>2.0.  5.  CT surgery notified this a.m. for MVR ASAP.  6.  Discussed treatment plan with Pino Case MD.    Thank you for allowing me to participate in the care of this patient.      Please contact me with any questions.    Shahzad oS M.D.   Cardiologist, [facility]  [phone]

## 2021-02-04 NOTE — PROGRESS NOTES
Critical Care Progress Note    Date of admission  2/3/2021    Chief Complaint  40 y.o. female with recent diagnosis of severe tricuspid regurgitation, severe mitral stenosis, CHF 2/3/2021 after she was found down on the floor as per EMS. According to EMS, her blood glucose was in the 60s given IV D50 with improvement. In the ER, she was found to be hypotensive and tachycardic.     Given that she met criteria for sepsis on vital signs and blood work she was treated with IVF LR 30 ml/kg, cultures drawn and broad spectrum antibiotics Vancomycin and Zosyn.    Labs showed Na of 122, Potassium of 4.1, chloride of 86, bicarb of 9, HAGMA 27, BUN 25, Cr of 2.17, total bilirubin of 4.1 Lactic acid of 12.3, troponin of 102 and BNP of 11,344. Cardiology was consulted who did a stat echocardiogram and recommended CT surgery consult in am.     Formal ECHO demonstrated severe RV volume overload/decreased function in the setting of known PAH + severe MS.    Hospital Course  2/3 admitted to ICU  2/4 improved diuresis, symptomatic improvement. CTS plans to perform surgery Monday    Interval Problem Update  Reviewed last 24 hour events:  Awake and alert, oriented x 3  SR  MAP > 60 off pressors  Excellent UOP on lasix TID  Amio gtt  Heparin SQ  Observing off abx    Review of Systems  Review of Systems   Constitutional: Positive for malaise/fatigue. Negative for chills and fever.   HENT: Negative.    Eyes: Negative.    Respiratory: Positive for cough and shortness of breath.    Cardiovascular: Positive for leg swelling. Negative for chest pain.   Gastrointestinal: Positive for nausea. Negative for abdominal pain and vomiting.   Genitourinary: Negative.    Musculoskeletal: Negative.    Skin: Negative for itching and rash.        Vital Signs for last 24 hours   Temp:  [36.1 °C (96.9 °F)-36.5 °C (97.7 °F)] 36.1 °C (96.9 °F)  Pulse:  [65-95] 71  Resp:  [15-27] 20  BP: ()/(51-67) 99/63  SpO2:  [85 %-100 %] 100 %    Hemodynamic  parameters for last 24 hours  CVP:  [17 MM HG-29 MM HG] 23 MM HG    Respiratory Information for the last 24 hours       Physical Exam   Physical Exam  Vitals signs and nursing note reviewed.   Constitutional:       Appearance: Normal appearance. She is ill-appearing.   HENT:      Head: Normocephalic and atraumatic.      Right Ear: External ear normal.      Left Ear: External ear normal.      Nose: Nose normal.      Mouth/Throat:      Mouth: Mucous membranes are moist.   Eyes:      Pupils: Pupils are equal, round, and reactive to light.   Neck:      Musculoskeletal: No neck rigidity.   Cardiovascular:      Rate and Rhythm: Normal rate and regular rhythm.      Pulses: Normal pulses.      Heart sounds: Murmur present. Gallop present.    Pulmonary:      Effort: Pulmonary effort is normal. No respiratory distress.      Breath sounds: No wheezing or rales.      Comments: Bibasilar crackles  Chest:      Chest wall: No tenderness.   Abdominal:      General: Abdomen is flat. There is no distension.      Tenderness: There is no abdominal tenderness. There is no guarding or rebound.   Musculoskeletal:      Right lower leg: Edema present.      Left lower leg: Edema present.   Lymphadenopathy:      Cervical: No cervical adenopathy.   Skin:     General: Skin is warm and dry.      Capillary Refill: Capillary refill takes less than 2 seconds.   Neurological:      General: No focal deficit present.      Mental Status: She is alert and oriented to person, place, and time.      Sensory: No sensory deficit.      Motor: No weakness.   Psychiatric:         Mood and Affect: Mood normal.         Medications  Current Facility-Administered Medications   Medication Dose Route Frequency Provider Last Rate Last Admin   • furosemide (LASIX) injection 40 mg  40 mg Intravenous TID Pino Case M.D.       • K+ Scale: Goal of 4.5  1 Each Intravenous Q6HRS Pino Case M.D.   1 Each at 02/04/21 1000   • potassium chloride in water (KCL)  ivpb **Administer in ICU only** 20 mEq  20 mEq Intravenous Once Pino Case M.D. 50 mL/hr at 02/04/21 1009 20 mEq at 02/04/21 1009   • heparin injection 5,000 Units  5,000 Units Subcutaneous Q8HRS Pino Case M.D.       • trimethobenzamide (TIGAN) injection 200 mg  200 mg Intramuscular Q6HRS PRN Pino Case M.D.       • amiodarone (NEXTERONE) 360 mg/200 mL infusion  0.5-1 mg/min Intravenous Continuous Pino Case M.D. 17 mL/hr at 02/04/21 0740 0.5 mg/min at 02/04/21 0740   • norepinephrine (Levophed) infusion 8 mg/250 mL (premix)  0-30 mcg/min Intravenous Continuous Judie May M.D.   Stopped at 02/03/21 2015       Fluids    Intake/Output Summary (Last 24 hours) at 2/4/2021 1039  Last data filed at 2/4/2021 0800  Gross per 24 hour   Intake 877.07 ml   Output 2880 ml   Net -2002.93 ml       Laboratory      Recent Labs     02/03/21  0110   CPKTOTAL 111     Recent Labs     02/03/21  0110 02/03/21 2024 02/04/21  0444   SODIUM 122* 127* 125*   POTASSIUM 4.1 4.2 3.7   CHLORIDE 86* 94* 93*   CO2 9* 18* 21   BUN 25* 32* 40*   CREATININE 2.17* 2.18* 2.21*   MAGNESIUM 1.8  --   --    CALCIUM 8.8 7.9* 7.7*     Recent Labs     02/03/21  0110 02/03/21  1150 02/03/21 2024 02/04/21  0444   ALTSGPT 11  --  18 20   ASTSGOT 32  --  39 38   ALKPHOSPHAT 124*  --  95 88   TBILIRUBIN 4.1*  --  2.7* 2.2*   AMYLASE  --  55  --   --    LIPASE  --  40  --   --    GLUCOSE 132*  --  96 99     Recent Labs     02/03/21  0110 02/03/21 2024 02/04/21  0444   WBC 11.2* 12.2* 9.6   NEUTSPOLYS 82.20* 77.00* 76.60*   LYMPHOCYTES 11.90* 11.00* 12.30*   MONOCYTES 4.60 9.60 8.10   EOSINOPHILS 0.20 1.20 2.00   BASOPHILS 0.40 0.70 0.50   ASTSGOT 32 39 38   ALTSGPT 11 18 20   ALKPHOSPHAT 124* 95 88   TBILIRUBIN 4.1* 2.7* 2.2*     Recent Labs     02/03/21  0110 02/03/21  0515 02/03/21  1150 02/03/21  2024 02/04/21  0444 02/04/21  0753   RBC 3.77*  --   --  3.77* 3.48*  --    HEMOGLOBIN 10.5*  --   --  10.4* 9.4*  --     HEMATOCRIT 34.2*  --   --  31.8* 29.2*  --    PLATELETCT 239  --   --  266 234  --    PROTHROMBTM 28.4* 28.0* 27.5*  --   --  25.4*   APTT 43.1*  --  43.9*  --   --   --    INR 2.57* 2.53* 2.47*  --   --  2.23*       Imaging  X-Ray:  I have personally reviewed the images and compared with prior images.    Assessment/Plan  * Mitral stenosis- (present on admission)  Assessment & Plan  ECHO re-demonstrates known severe rheumatic MS  Surgery tentatively for Monday  Gentle diuresis  Keep in sinus rhythm    RUSH (acute kidney injury) (HCC)- (present on admission)  Assessment & Plan  Cardiorenal  Creatinine 2.17 --> 2.2    Plan:  Avoid nephrotoxins as able  Monitor electrolytes and replete as needed  Renally dose adjust medications  Strict I/O monitoring  Lasix TID    Atrial fibrillation (HCC)  Assessment & Plan  Presented in shock with joslyn RVR in the setting of RV failure, PAH, severe MS and volume overload  First slowed then converted with digoxin x 1  Amio gtt   Mg 2, K 4    Bilirubinemia- (present on admission)  Assessment & Plan  Congestive  T bilirubin 4.1 --> 2    Plan:  Improving with diuresis  RUQ ultrasound reassuring  Amylase/lipase normal    Hyponatremia- (present on admission)  Assessment & Plan  Hypervolemic   Na 122  Free water restriction  Monitor closely    Hypoglycemia- (present on admission)  Assessment & Plan  Blood glucose 60s for EMS  Received dextrose in the ED  FSBS now in the 80s with improved mentation    Pulmonary hypertension (HCC)- (present on admission)  Assessment & Plan  Presented in RV failure  Gentle diuresis  Keep in sinus rhythm   Aim for euvoluemia using lasix   Avoid hypoxia, hypercapnia, and acidosis as able       VTE:  Heparin  Ulcer: Not Indicated  Lines: Central Line  Ongoing indication addressed and Arterial Line  Ongoing indication addressed    I have performed a physical exam and reviewed and updated ROS and Plan today (2/4/2021). In review of yesterday's note (2/3/2021),  there are no changes except as documented above.     Discussed patient condition and risk of morbidity and/or mortality with Hospitalist, Family, RN, RT, Pharmacy, Charge nurse / hot rounds and Patient  The patient remains critically ill.  Critical care time = 45 minutes in directly providing and coordinating critical care and extensive data review.  No time overlap and excludes procedures.

## 2021-02-05 NOTE — PROGRESS NOTES
Cardiology Follow Up Progress Note    Date of Service  2/5/2021    Attending Physician  Pino Case M.D.    HPI  Carolyn Hauser is a 40 y.o. female admitted 2/3/2021 with PMH of severe rheumatic mitral stenosis secondary pulmonary hypertension and right heart failure and recent Covid 19 infection pending mitral valve surgery presents with cardiogenic shock, paroxysmal atrial flutter, RUSH and lactic acidosis.  Converted to NSR.    Interim Events  2/3: /66.  Pulse 92.  Sinus.  Tired somewhat short of breath but feeling better.  No chest pain.  2/4: /58.  Pulse 62.  Sinus.  Feels better.  2/5: /56.  Pulse 78.  Sinus.  PVCs.  No S OB.  U0 5300.    Review of Systems  Review of Systems   Constitutional: Positive for fatigue.   Respiratory: Negative for chest tightness and shortness of breath.    Cardiovascular: Negative for palpitations.   Neurological: Positive for weakness. Negative for dizziness and light-headedness.       Vital signs in last 24 hours  Temp:  [36.2 °C (97.1 °F)-36.3 °C (97.3 °F)] 36.2 °C (97.2 °F)  Pulse:  [41-92] 75  Resp:  [15-28] 18  BP: ()/(38-73) 114/62  SpO2:  [91 %-100 %] 95 %    Physical Exam  Physical Exam  Constitutional:       General: She is not in acute distress.     Comments:  Looks better.   Neck:      Vascular: JVD present.      Comments: Markedly elevated JVP  Cardiovascular:      Rate and Rhythm: Normal rate and regular rhythm.      Heart sounds: S1 normal and S2 normal. Murmur present. No friction rub. No gallop.    Pulmonary:      Effort: Pulmonary effort is normal.      Breath sounds: Normal breath sounds. No wheezing, rhonchi or rales.   Musculoskeletal:      Right lower leg: No edema.      Left lower leg: No edema.   Skin:     General: Skin is warm and dry.   Neurological:      Mental Status: She is oriented to person, place, and time.   Psychiatric:         Behavior: Behavior normal.         Lab Review  Lab Results   Component Value Date/Time     WBC 9.6 02/04/2021 04:44 AM    RBC 3.48 (L) 02/04/2021 04:44 AM    HEMOGLOBIN 9.4 (L) 02/04/2021 04:44 AM    HEMATOCRIT 29.2 (L) 02/04/2021 04:44 AM    MCV 83.9 02/04/2021 04:44 AM    MCH 27.0 02/04/2021 04:44 AM    MCHC 32.2 (L) 02/04/2021 04:44 AM    MPV 10.3 02/04/2021 04:44 AM      Lab Results   Component Value Date/Time    SODIUM 133 (L) 02/05/2021 04:45 AM    POTASSIUM 3.9 02/05/2021 04:45 AM    CHLORIDE 98 02/05/2021 04:45 AM    CO2 23 02/05/2021 04:45 AM    GLUCOSE 89 02/05/2021 04:45 AM    BUN 35 (H) 02/05/2021 04:45 AM    CREATININE 1.72 (H) 02/05/2021 04:45 AM      Lab Results   Component Value Date/Time    ASTSGOT 38 02/04/2021 04:44 AM    ALTSGPT 20 02/04/2021 04:44 AM     Lab Results   Component Value Date/Time    TROPONINT 102 (H) 02/03/2021 01:10 AM       Recent Labs     02/03/21  0110   NTPROBNP 15734*       Cardiac Imaging and Procedures Review  EKG:  My personal interpretation of the EKG dated 2/3/2021 is normal sinus rhythm, rate 94.  Right ventricular hypertrophy.    Echocardiogram: 2/3/2021  Compared to the images of the prior study done 12/15/2020, the RV   appears larger.  Left ventricular ejection fraction is visually estimated to be 55%.  Abnormal septal motion consistent with right ventricular (RV) volume   overload and/or elevated RV end-diastolic pressure.  Severely dilated right ventricle.  Reduced right ventricular systolic   function.  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  Tong score calcualted at 14.  Mean transvalvular gradient is 17 mmHg at a heart rate of 119 BPM.    Imaging  Chest X-Ray: 2/3/2021  1.  New left IJV central line tip projects in satisfactory position. No pneumothorax.  2.  Cardiomegaly with diffuse interstitial edema or pneumonia pattern.    Assessment  1.  Cardiogenic shock, biventricular failure, valvular due to severe mitral stenosis.  2.  Pulmonary hypertension.  3.  Paroxysmal atrial flutter.  4.  Lactic acidosis.  5.  RUSH.  6.  Hyponatremia.  7.   Hypoglycemia.  8.  Hyperbilirubinemia.    Recommendation Discussion  1.  Clinically improved with good diuresis and improved renal function.  2.  Continue IV amiodarone.  3.  Cautious IV diuresis, avoid hypotension and renal insufficiency.  4.  Optimize electrolytes K>4.0,Mg>2.0.  5.  Needs transition to systemic anticoagulation with heparin for thromboembolic prophylaxis; mitral stenosis and atrial flutter; INR currently subtherapeutic.  6.  CT surgery for mitral valve replacement tentatively scheduled 2/8/2021.    Thank you for allowing me to participate in the care of this patient.    Please contact me with any questions.    Shahzad So M.D.   Cardiologist, [facility]  [phone]

## 2021-02-05 NOTE — CARE PLAN
Problem: Knowledge Deficit  Goal: Knowledge of disease process/condition, treatment plan, diagnostic tests, and medications will improve  Outcome: PROGRESSING AS EXPECTED  Intervention: Assess knowledge level of disease process/condition, treatment plan, diagnostic tests, and medications  Note: POC discussed with patient, all questions answered, medication education completed, pt educated on disease process.

## 2021-02-05 NOTE — CARE PLAN
Problem: Communication  Goal: The ability to communicate needs accurately and effectively will improve  Outcome: PROGRESSING SLOWER THAN EXPECTED     Problem: Safety  Goal: Will remain free from injury  Outcome: PROGRESSING SLOWER THAN EXPECTED  Goal: Will remain free from falls  Outcome: PROGRESSING SLOWER THAN EXPECTED     Problem: Infection  Goal: Will remain free from infection  Outcome: PROGRESSING SLOWER THAN EXPECTED     Problem: Venous Thromboembolism (VTW)/Deep Vein Thrombosis (DVT) Prevention:  Goal: Patient will participate in Venous Thrombosis (VTE)/Deep Vein Thrombosis (DVT)Prevention Measures  Outcome: PROGRESSING SLOWER THAN EXPECTED     Problem: Bowel/Gastric:  Goal: Normal bowel function is maintained or improved  Outcome: PROGRESSING SLOWER THAN EXPECTED  Goal: Will not experience complications related to bowel motility  Outcome: PROGRESSING SLOWER THAN EXPECTED     Problem: Knowledge Deficit  Goal: Knowledge of disease process/condition, treatment plan, diagnostic tests, and medications will improve  Outcome: PROGRESSING SLOWER THAN EXPECTED  Goal: Knowledge of the prescribed therapeutic regimen will improve  Outcome: PROGRESSING SLOWER THAN EXPECTED     Problem: Discharge Barriers/Planning  Goal: Patient's continuum of care needs will be met  Outcome: PROGRESSING SLOWER THAN EXPECTED     Problem: Respiratory:  Goal: Respiratory status will improve  Outcome: PROGRESSING SLOWER THAN EXPECTED     Problem: Fluid Volume:  Goal: Will maintain balanced intake and output  Outcome: PROGRESSING SLOWER THAN EXPECTED     Problem: Mobility  Goal: Risk for activity intolerance will decrease  Outcome: PROGRESSING SLOWER THAN EXPECTED     Problem: Skin Integrity  Goal: Risk for impaired skin integrity will decrease  Outcome: PROGRESSING SLOWER THAN EXPECTED

## 2021-02-05 NOTE — PROGRESS NOTES
Critical Care Progress Note    Date of admission  2/3/2021    Chief Complaint  40 y.o. female with recent diagnosis of severe tricuspid regurgitation, severe mitral stenosis, CHF 2/3/2021 after she was found down on the floor as per EMS. According to EMS, her blood glucose was in the 60s given IV D50 with improvement. In the ER, she was found to be hypotensive and tachycardic.     Given that she met criteria for sepsis on vital signs and blood work she was treated with IVF LR 30 ml/kg, cultures drawn and broad spectrum antibiotics Vancomycin and Zosyn.    Labs showed Na of 122, Potassium of 4.1, chloride of 86, bicarb of 9, HAGMA 27, BUN 25, Cr of 2.17, total bilirubin of 4.1 Lactic acid of 12.3, troponin of 102 and BNP of 11,344. Cardiology was consulted who did a stat echocardiogram and recommended CT surgery consult in am.     Formal ECHO demonstrated severe RV volume overload/decreased function in the setting of known PAH + severe MS.    Hospital Course  2/3 admitted to ICU  2/4 improved diuresis, symptomatic improvement. CTS plans to perform surgery Monday 2/5 Hemodynamics, symptoms, and labs dramatically improved with diuresis     Interval Problem Update  Reviewed last 24 hour events:  Awake and alert, oriented x 3  SR  MAP > 65   Excellent UOP on lasix BID  Amio gtt  Heparin gtt  Observing off abx  Cr, INR, Bili improving with diuresis    Review of Systems  Review of Systems   Constitutional: Positive for malaise/fatigue. Negative for chills and fever.   Eyes: Negative.    Respiratory: Positive for cough and shortness of breath.    Cardiovascular: Positive for leg swelling. Negative for chest pain.   Gastrointestinal: Positive for nausea. Negative for abdominal pain and vomiting.   Genitourinary: Negative.    Musculoskeletal: Negative.    Skin: Negative for itching and rash.        Vital Signs for last 24 hours   Temp:  [36.2 °C (97.2 °F)-36.7 °C (98 °F)] 36.7 °C (98 °F)  Pulse:  [] 72  Resp:  [15-28]  17  BP: ()/(52-73) 98/66  SpO2:  [85 %-100 %] 100 %    Hemodynamic parameters for last 24 hours  CVP:  [17 MM HG-28 MM HG] 28 MM HG    Respiratory Information for the last 24 hours       Physical Exam   Physical Exam  Vitals signs and nursing note reviewed.   Constitutional:       Appearance: Normal appearance. She is ill-appearing.   HENT:      Head: Normocephalic and atraumatic.      Right Ear: External ear normal.      Left Ear: External ear normal.      Nose: Nose normal.      Mouth/Throat:      Mouth: Mucous membranes are moist.   Eyes:      Pupils: Pupils are equal, round, and reactive to light.   Neck:      Musculoskeletal: No neck rigidity.   Cardiovascular:      Rate and Rhythm: Normal rate and regular rhythm.      Pulses: Normal pulses.      Heart sounds: Murmur present. Gallop present.    Pulmonary:      Effort: Pulmonary effort is normal. No respiratory distress.      Breath sounds: No wheezing or rales.      Comments: Bibasilar crackles  Chest:      Chest wall: No tenderness.   Abdominal:      General: Abdomen is flat. There is no distension.      Tenderness: There is no abdominal tenderness. There is no guarding or rebound.   Musculoskeletal:      Right lower leg: Edema present.      Left lower leg: Edema present.   Lymphadenopathy:      Cervical: No cervical adenopathy.   Skin:     General: Skin is warm and dry.      Capillary Refill: Capillary refill takes less than 2 seconds.   Neurological:      General: No focal deficit present.      Mental Status: She is alert and oriented to person, place, and time.      Sensory: No sensory deficit.      Motor: No weakness.   Psychiatric:         Mood and Affect: Mood normal.         Medications  Current Facility-Administered Medications   Medication Dose Route Frequency Provider Last Rate Last Admin   • furosemide (LASIX) injection 40 mg  40 mg Intravenous BID DIURETIC Pino Case M.D.       • heparin infusion 25,000 units in 500 mL 0.45% NACL  0-30  Units/kg/hr Intravenous Continuous Pino Case M.D. 23.8 mL/hr at 02/05/21 0953 18 Units/kg/hr at 02/05/21 0953   • heparin injection 2,600 Units  40 Units/kg Intravenous PRN Pino Case M.D.       • potassium chloride in water (KCL) ivpb **Administer in ICU only** 20 mEq  20 mEq Intravenous Once Pino Case M.D. 50 mL/hr at 02/05/21 1455 20 mEq at 02/05/21 1455    Followed by   • potassium chloride (KCL) ivpb 10 mEq  10 mEq Intravenous Once Pino Case M.D.       • K+ Scale: Goal of 5  1 Each Intravenous Q6HRS Pino Case M.D.   1 Each at 02/05/21 1200   • trimethobenzamide (TIGAN) injection 200 mg  200 mg Intramuscular Q6HRS PRN Pino Case M.D.       • amiodarone (NEXTERONE) 360 mg/200 mL infusion  0.5-1 mg/min Intravenous Continuous Pino Case M.D. 17 mL/hr at 02/05/21 0700 0.5 mg/min at 02/05/21 0700       Fluids    Intake/Output Summary (Last 24 hours) at 2/5/2021 1517  Last data filed at 2/5/2021 1400  Gross per 24 hour   Intake 2419.03 ml   Output 3980 ml   Net -1560.97 ml       Laboratory      Recent Labs     02/03/21  0110   CPKTOTAL 111     Recent Labs     02/04/21  1530 02/04/21  2346 02/05/21  0445 02/05/21  1141   SODIUM 131*  --  133* 131*   POTASSIUM 3.3* 3.6 3.9 3.5*   CHLORIDE 98  --  98 99   CO2 21  --  23 24   BUN 35*  --  35* 29*   CREATININE 1.96*  --  1.72* 1.51*   MAGNESIUM 2.0  --  1.9 1.8   CALCIUM 8.0*  --  8.1* 8.0*     Recent Labs     02/03/21  0110 02/03/21  1150 02/03/21  2024 02/04/21  0444 02/04/21  1530 02/05/21  0445 02/05/21  1141   ALTSGPT 11  --  18 20  --   --   --    ASTSGOT 32  --  39 38  --   --   --    ALKPHOSPHAT 124*  --  95 88  --   --   --    TBILIRUBIN 4.1*  --  2.7* 2.2*  --   --   --    AMYLASE  --  55  --   --   --   --   --    LIPASE  --  40  --   --   --   --   --    GLUCOSE 132*  --  96 99 135* 89 108*     Recent Labs     02/03/21  0110 02/03/21 2024 02/04/21  0444 02/05/21  0903   WBC 11.2* 12.2* 9.6 6.5   NEUTSPOLYS  82.20* 77.00* 76.60* 67.80   LYMPHOCYTES 11.90* 11.00* 12.30* 14.20*   MONOCYTES 4.60 9.60 8.10 11.50   EOSINOPHILS 0.20 1.20 2.00 5.20   BASOPHILS 0.40 0.70 0.50 0.80   ASTSGOT 32 39 38  --    ALTSGPT 11 18 20  --    ALKPHOSPHAT 124* 95 88  --    TBILIRUBIN 4.1* 2.7* 2.2*  --      Recent Labs     02/03/21  0110 02/03/21  0110 02/03/21  1150 02/03/21 2024 02/04/21 0444 02/04/21  0753 02/05/21 0445 02/05/21  0903   RBC 3.77*  --   --  3.77* 3.48*  --   --  3.36*   HEMOGLOBIN 10.5*  --   --  10.4* 9.4*  --   --  9.1*   HEMATOCRIT 34.2*  --   --  31.8* 29.2*  --   --  28.5*   PLATELETCT 239  --   --  266 234  --   --  219   PROTHROMBTM 28.4*   < > 27.5*  --   --  25.4* 19.7* 19.9*   APTT 43.1*  --  43.9*  --   --   --   --  42.4*   INR 2.57*   < > 2.47*  --   --  2.23* 1.62* 1.64*    < > = values in this interval not displayed.       Imaging  X-Ray:  I have personally reviewed the images and compared with prior images.    Assessment/Plan  * Mitral stenosis- (present on admission)  Assessment & Plan  ECHO re-demonstrates known severe rheumatic MS  Surgery tentatively for Monday  Gentle diuresis  Keep in sinus rhythm    RUSH (acute kidney injury) (HCC)- (present on admission)  Assessment & Plan  Cardiorenal  Creatinine 2.17 --> 2.2  Improving with diuresis     Plan:  Avoid nephrotoxins as able  Renally dose adjust medications  Strict I/O monitoring  Lasix 40 mg BID (prior TID)    Atrial fibrillation (HCC)  Assessment & Plan  Presented in shock with a.fib RVR in the setting of RV failure, PAH, severe MS and volume overload  First slowed then converted with digoxin x 1  Heparin gtt  Amio gtt   Mg 2, K 4    Bilirubinemia- (present on admission)  Assessment & Plan  Congestive  Improving with diuresis    Plan:  RUQ ultrasound reassuring  Amylase/lipase normal    Hyponatremia- (present on admission)  Assessment & Plan  Hypervolemic   Na 122  Free water restriction  Monitor closely    Hypoglycemia- (present on  admission)  Assessment & Plan  Blood glucose 60s for EMS  Received dextrose in the ED  FSBS now in the 80s with improved mentation    Pulmonary hypertension (HCC)- (present on admission)  Assessment & Plan  Presented in RV failure  Gentle diuresis  Keep in sinus rhythm   Aim for euvoluemia using lasix   Avoid hypoxia, hypercapnia, and acidosis as able       VTE:  Heparin  Ulcer: Not Indicated  Lines: Central Line  Ongoing indication addressed and Arterial Line  Ongoing indication addressed    I have performed a physical exam and reviewed and updated ROS and Plan today (2/5/2021). In review of yesterday's note (2/4/2021), there are no changes except as documented above.     Discussed patient condition and risk of morbidity and/or mortality with Hospitalist, Family, RN, RT, Pharmacy, Charge nurse / hot rounds and Patient  The patient remains critically ill.  Critical care time = 51 minutes in directly providing and coordinating critical care and extensive data review.  No time overlap and excludes procedures.

## 2021-02-06 NOTE — PROGRESS NOTES
Cardiology Follow Up Progress Note    Date of Service  2/6/2021    Attending Physician  Pino Case M.D.    HPI  Carolyn Hauser is a 40 y.o. female admitted 2/3/2021 with PMH of severe rheumatic mitral stenosis secondary pulmonary hypertension and right heart failure and recent Covid 19 infection pending mitral valve surgery presents with cardiogenic shock, paroxysmal atrial flutter, RUSH and lactic acidosis.  Converted to NSR.    Interim Events  2/3: /66.  Pulse 92.  Sinus.  Tired somewhat short of breath but feeling better.  No chest pain.  2/4: /58.  Pulse 62.  Sinus.  Feels better.  2/5: /56.  Pulse 78.  Sinus.  PVCs.  No S OB.  U0 5300.  2/6: /75.  Pulse 70.  Sinus.  No symptoms.  UO 3300 cc.    Review of Systems  Review of Systems   Constitutional: Positive for fatigue.   Respiratory: Negative for chest tightness and shortness of breath.    Cardiovascular: Negative for palpitations.   Neurological: Positive for weakness. Negative for dizziness and light-headedness.       Vital signs in last 24 hours  Temp:  [36.4 °C (97.5 °F)-36.7 °C (98.1 °F)] 36.4 °C (97.6 °F)  Pulse:  [61-84] 84  Resp:  [15-24] 23  BP: ()/(61-75) 94/66  SpO2:  [88 %-98 %] 98 %    Physical Exam  Physical Exam  Constitutional:       General: She is not in acute distress.     Comments:  Looks better.   Neck:      Vascular: JVD present.      Comments: Markedly elevated JVP  Cardiovascular:      Rate and Rhythm: Normal rate and regular rhythm.      Heart sounds: S1 normal and S2 normal. Murmur present. No friction rub. No gallop.    Pulmonary:      Effort: Pulmonary effort is normal.      Breath sounds: Normal breath sounds. No wheezing, rhonchi or rales.   Musculoskeletal:      Right lower leg: No edema.      Left lower leg: No edema.   Skin:     General: Skin is warm and dry.   Neurological:      Mental Status: She is oriented to person, place, and time.   Psychiatric:         Behavior: Behavior normal.          Lab Review  Lab Results   Component Value Date/Time    WBC 5.7 02/06/2021 07:36 AM    RBC 3.34 (L) 02/06/2021 07:36 AM    HEMOGLOBIN 9.1 (L) 02/06/2021 07:36 AM    HEMATOCRIT 28.3 (L) 02/06/2021 07:36 AM    MCV 84.7 02/06/2021 07:36 AM    MCH 27.2 02/06/2021 07:36 AM    MCHC 32.2 (L) 02/06/2021 07:36 AM    MPV 10.7 02/06/2021 07:36 AM      Lab Results   Component Value Date/Time    SODIUM 128 (L) 02/06/2021 11:00 AM    POTASSIUM 3.8 02/06/2021 11:00 AM    CHLORIDE 96 02/06/2021 11:00 AM    CO2 21 02/06/2021 11:00 AM    GLUCOSE 131 (H) 02/06/2021 11:00 AM    BUN 30 (H) 02/06/2021 11:00 AM    CREATININE 1.54 (H) 02/06/2021 11:00 AM      Lab Results   Component Value Date/Time    ASTSGOT 38 02/04/2021 04:44 AM    ALTSGPT 20 02/04/2021 04:44 AM     Lab Results   Component Value Date/Time    TROPONINT 102 (H) 02/03/2021 01:10 AM       No results for input(s): NTPROBNP in the last 72 hours.    Cardiac Imaging and Procedures Review  EKG:  My personal interpretation of the EKG dated 2/3/2021 is normal sinus rhythm, rate 94.  Right ventricular hypertrophy.    Echocardiogram: 2/3/2021  Compared to the images of the prior study done 12/15/2020, the RV   appears larger.  Left ventricular ejection fraction is visually estimated to be 55%.  Abnormal septal motion consistent with right ventricular (RV) volume   overload and/or elevated RV end-diastolic pressure.  Severely dilated right ventricle.  Reduced right ventricular systolic   function.  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  Tong score calcualted at 14.  Mean transvalvular gradient is 17 mmHg at a heart rate of 119 BPM.    Imaging  Chest X-Ray: 2/3/2021  1.  New left IJV central line tip projects in satisfactory position. No pneumothorax.  2.  Cardiomegaly with diffuse interstitial edema or pneumonia pattern.    Assessment  1.  Cardiogenic shock, biventricular failure, valvular due to severe mitral stenosis.  2.  Pulmonary hypertension.  3.  Paroxysmal  atrial flutter.  4.  Lactic acidosis.  5.  RUSH.  6.  Hyponatremia.  7.  Hypoglycemia.  8.  Hyperbilirubinemia.    Recommendation Discussion  1.  Remains clinically improved and hemodynamically stable.  2.  Continue IV amiodarone.  3.  Cautious IV diuresis, avoid hypotension and renal insufficiency.  4.  Optimize electrolytes K>4.0,Mg>2.0.  5.  Continue IV heparin.  6.  CT surgery for mitral valve replacement tentatively scheduled 2/8/2021.    Thank you for allowing me to participate in the care of this patient.    Please contact me with any questions.    Shahzad So M.D.   Cardiologist, [facility]  [phone]

## 2021-02-06 NOTE — PROGRESS NOTES
Dr Case updated on some bright red blood after BM, possibly from wiping.  Will check Hgb at 1800

## 2021-02-06 NOTE — PROGRESS NOTES
Assumed care of patient, bedside report received from JUVE Calzada. Updated on POC, call light within reach and fall precautions in place. Bed locked and in lowest position. Patient instructed to call for assistance before getting out of bed. All questions answered, no other needs at this time.

## 2021-02-06 NOTE — PROGRESS NOTES
Critical Care Progress Note    Date of admission  2/3/2021    Chief Complaint  40 y.o. female with recent diagnosis of severe tricuspid regurgitation, severe mitral stenosis, CHF 2/3/2021 after she was found down on the floor as per EMS. According to EMS, her blood glucose was in the 60s given IV D50 with improvement. In the ER, she was found to be hypotensive and tachycardic.     Given that she met criteria for sepsis on vital signs and blood work she was treated with IVF LR 30 ml/kg, cultures drawn and broad spectrum antibiotics Vancomycin and Zosyn.    Labs showed Na of 122, Potassium of 4.1, chloride of 86, bicarb of 9, HAGMA 27, BUN 25, Cr of 2.17, total bilirubin of 4.1 Lactic acid of 12.3, troponin of 102 and BNP of 11,344. Cardiology was consulted who did a stat echocardiogram and recommended CT surgery consult in am.     Formal ECHO demonstrated severe RV volume overload/decreased function in the setting of known PAH + severe MS.    Hospital Course  2/3 admitted to ICU  2/4 improved diuresis, symptomatic improvement. CTS plans to perform surgery Monday 2/5 Hemodynamics, symptoms, and labs dramatically improved with diuresis   2/6 hold off further diuretics and allow to auto-diurese    Interval Problem Update  Reviewed last 24 hour events:  Awake and alert, oriented x 3  Much improved LE edema  SR  MAP > 65   Good UOP  Amio gtt  Heparin gtt  Observing off abx    Review of Systems  Review of Systems   Constitutional: Negative for chills, fever and malaise/fatigue.   Eyes: Negative.    Respiratory: Positive for cough and shortness of breath.    Cardiovascular: Negative for chest pain and leg swelling.   Gastrointestinal: Negative for abdominal pain, nausea and vomiting.   Genitourinary: Negative.    Musculoskeletal: Negative.    Skin: Negative for itching and rash.        Vital Signs for last 24 hours   Temp:  [36.4 °C (97.5 °F)-36.7 °C (98.1 °F)] 36.4 °C (97.6 °F)  Pulse:  [61-84] 84  Resp:  [15-24] 23  BP:  ()/(61-75) 94/66  SpO2:  [88 %-98 %] 98 %    Hemodynamic parameters for last 24 hours  CVP:  [14 MM HG-36 MM HG] 34 MM HG    Respiratory Information for the last 24 hours       Physical Exam   Physical Exam  Vitals signs and nursing note reviewed.   Constitutional:       Appearance: Normal appearance. She is ill-appearing.   HENT:      Head: Normocephalic and atraumatic.      Right Ear: External ear normal.      Left Ear: External ear normal.      Nose: Nose normal.      Mouth/Throat:      Mouth: Mucous membranes are moist.   Eyes:      Pupils: Pupils are equal, round, and reactive to light.   Neck:      Musculoskeletal: No neck rigidity.   Cardiovascular:      Rate and Rhythm: Normal rate and regular rhythm.      Pulses: Normal pulses.      Heart sounds: Murmur present. Gallop present.    Pulmonary:      Effort: Pulmonary effort is normal. No respiratory distress.      Breath sounds: No wheezing or rales.      Comments: Bibasilar crackles  Chest:      Chest wall: No tenderness.   Abdominal:      General: Abdomen is flat. There is no distension.      Tenderness: There is no abdominal tenderness. There is no guarding or rebound.   Musculoskeletal:      Comments: Much improved from prior pitting edema   Lymphadenopathy:      Cervical: No cervical adenopathy.   Skin:     General: Skin is warm and dry.      Capillary Refill: Capillary refill takes less than 2 seconds.   Neurological:      General: No focal deficit present.      Mental Status: She is alert and oriented to person, place, and time.      Sensory: No sensory deficit.      Motor: No weakness.   Psychiatric:         Mood and Affect: Mood normal.         Medications  Current Facility-Administered Medications   Medication Dose Route Frequency Provider Last Rate Last Admin   • heparin infusion 25,000 units in 500 mL 0.45% NACL  0-30 Units/kg/hr Intravenous Continuous Pino Case M.D. 23.8 mL/hr at 02/06/21 0712 18 Units/kg/hr at 02/06/21 0712   •  heparin injection 2,600 Units  40 Units/kg Intravenous PRN Pino Case M.D.       • K+ Scale: Goal of 5  1 Each Intravenous Q6HRS Pino Case M.D.   1 Each at 02/06/21 1200   • trimethobenzamide (TIGAN) injection 200 mg  200 mg Intramuscular Q6HRS PRN Pino Case M.D.       • amiodarone (NEXTERONE) 360 mg/200 mL infusion  0.5-1 mg/min Intravenous Continuous Pino Case M.D. 17 mL/hr at 02/06/21 1042 0.5 mg/min at 02/06/21 1042       Fluids    Intake/Output Summary (Last 24 hours) at 2/6/2021 1406  Last data filed at 2/6/2021 1203  Gross per 24 hour   Intake 2812.6 ml   Output 2535 ml   Net 277.6 ml       Laboratory          Recent Labs     02/05/21 1141 02/05/21 1141 02/06/21 0100 02/06/21  0450 02/06/21  1100   SODIUM 131*  --  129*  --  128*   POTASSIUM 3.5*   < > 3.9 4.4 3.8   CHLORIDE 99  --  96  --  96   CO2 24  --  23  --  21   BUN 29*  --  30*  --  30*   CREATININE 1.51*  --  1.59*  --  1.54*   MAGNESIUM 1.8  --  1.6  --  1.7   CALCIUM 8.0*  --  8.0*  --  8.4*    < > = values in this interval not displayed.     Recent Labs     02/03/21 2024 02/04/21 0444 02/04/21 0444 02/05/21  1141 02/06/21  0100 02/06/21  1100   ALTSGPT 18 20  --   --   --   --    ASTSGOT 39 38  --   --   --   --    ALKPHOSPHAT 95 88  --   --   --   --    TBILIRUBIN 2.7* 2.2*  --   --   --   --    GLUCOSE 96 99   < > 108* 95 131*    < > = values in this interval not displayed.     Recent Labs     02/03/21 2024 02/04/21 0444 02/05/21  0903 02/05/21 2014 02/06/21  0736   WBC 12.2* 9.6 6.5 6.6 5.7   NEUTSPOLYS 77.00* 76.60* 67.80  --  60.00   LYMPHOCYTES 11.00* 12.30* 14.20*  --  21.10*   MONOCYTES 9.60 8.10 11.50  --  10.60   EOSINOPHILS 1.20 2.00 5.20  --  6.70   BASOPHILS 0.70 0.50 0.80  --  1.10   ASTSGOT 39 38  --   --   --    ALTSGPT 18 20  --   --   --    ALKPHOSPHAT 95 88  --   --   --    TBILIRUBIN 2.7* 2.2*  --   --   --      Recent Labs     02/05/21  0445 02/05/21  0903 02/05/21 2014 02/06/21  0453  02/06/21  0736   RBC  --  3.36* 3.39*  --  3.34*   HEMOGLOBIN  --  9.1* 9.3*  --  9.1*   HEMATOCRIT  --  28.5* 28.7*  --  28.3*   PLATELETCT  --  219 209  --  211   PROTHROMBTM 19.7* 19.9*  --  21.2*  --    APTT  --  42.4*  --   --   --    INR 1.62* 1.64*  --  1.77*  --        Imaging  X-Ray:  I have personally reviewed the images and compared with prior images.    Assessment/Plan  * Mitral stenosis- (present on admission)  Assessment & Plan  ECHO re-demonstrates known severe rheumatic MS  Surgery tentatively for Monday  Gentle diuresis  Keep in sinus rhythm    RUSH (acute kidney injury) (HCC)- (present on admission)  Assessment & Plan  Cardiorenal  Creatinine 2.17 --> 2.2  Significant net negative since admission    Plan:  Avoid nephrotoxins as able  Renally dose adjust medications  Strict I/O monitoring  2/6 held lasix    Atrial fibrillation (HCC)  Assessment & Plan  Presented in shock with joslyn RVR in the setting of RV failure, PAH, severe MS and volume overload  First slowed then converted with digoxin x 1  Heparin gtt  Amio gtt   Mg 2, K 4    Bilirubinemia- (present on admission)  Assessment & Plan  Congestive  Improved with diuresis  RUQ ultrasound reassuring  Amylase/lipase normal    Plan:  Daily monitoring    Hyponatremia- (present on admission)  Assessment & Plan  Hypervolemic   Na 122  Free water restriction  Monitor closely    Hypoglycemia- (present on admission)  Assessment & Plan  Blood glucose 60s for EMS  Received dextrose in the ED  FSBS now in the 80s with improved mentation    Pulmonary hypertension (HCC)- (present on admission)  Assessment & Plan  Presented in RV failure  Keep in sinus rhythm   Aim for euvoluemia   Avoid hypoxia, hypercapnia, and acidosis as able       VTE:  Heparin  Ulcer: Not Indicated  Lines: Central Line  Ongoing indication addressed and Arterial Line  Ongoing indication addressed    I have performed a physical exam and reviewed and updated ROS and Plan today (2/6/2021). In  review of yesterday's note (2/5/2021), there are no changes except as documented above.     Discussed patient condition and risk of morbidity and/or mortality with Hospitalist, Family, RN, RT, Pharmacy, Charge nurse / hot rounds and Patient  The patient remains critically ill.  Critical care time = 50 minutes in directly providing and coordinating critical care and extensive data review.  No time overlap and excludes procedures.

## 2021-02-07 PROBLEM — R51.9 HEADACHE: Status: ACTIVE | Noted: 2021-01-01

## 2021-02-07 PROBLEM — E16.2 HYPOGLYCEMIA: Status: RESOLVED | Noted: 2021-01-01 | Resolved: 2021-01-01

## 2021-02-07 PROBLEM — I50.813 ACUTE ON CHRONIC RIGHT-SIDED HEART FAILURE (HCC): Status: ACTIVE | Noted: 2021-01-01

## 2021-02-07 NOTE — PROGRESS NOTES
Pt overnight c/o headache, 5/10 at 2000 and declined to take any pain medicine. Pt headache progressed to 7/10 at midnight, after discussion pt agreed to take Tylenol. Pt slept for a few hours. Woke up with headache around 0330 associated with nausea, gave antiemetics with some improvement. At 0600 pt c/o 8/10 headache with nausea but refusing to take and pain medicine after educating her in regards to pain control. VSS. Monitoring continues.

## 2021-02-07 NOTE — PROGRESS NOTES
Critical Care Progress Note    Date of admission  2/3/2021    Chief Complaint  40 y.o. female with recent diagnosis of severe tricuspid regurgitation, severe mitral stenosis, CHF 2/3/2021 after she was found down on the floor as per EMS. According to EMS, her blood glucose was in the 60s given IV D50 with improvement. In the ER, she was found to be hypotensive and tachycardic.     Given that she met criteria for sepsis on vital signs and blood work she was treated with IVF LR 30 ml/kg, cultures drawn and broad spectrum antibiotics Vancomycin and Zosyn.    Labs showed Na of 122, Potassium of 4.1, chloride of 86, bicarb of 9, HAGMA 27, BUN 25, Cr of 2.17, total bilirubin of 4.1 Lactic acid of 12.3, troponin of 102 and BNP of 11,344. Cardiology was consulted who did a stat echocardiogram and recommended CT surgery consult in am.     Formal ECHO demonstrated severe RV volume overload/decreased function in the setting of known PAH + severe MS.    Hospital Course  2/3 admitted to ICU  2/4 improved diuresis, symptomatic improvement. CTS plans to perform surgery Monday 2/5 Hemodynamics, symptoms, and labs dramatically improved with diuresis   2/6 hold off further diuretics and allow to auto-diurese  2/7 net + 500 yesterday, 1 time lasix 20 mg to keep even-slightly negative. OR tomorrow with CTS    Interval Problem Update  Reviewed last 24 hour events:  Awake and alert, oriented x 3  Complains of HA on heparin gtt: CTH reassuring  2+ pitting edema increased from trace yesterday  SR  MAP > 65   Good UOP  Amio gtt  Heparin gtt  Observing off abx    Review of Systems  Review of Systems   Constitutional: Negative for chills, fever and malaise/fatigue.   Eyes: Negative.    Respiratory: Positive for cough and shortness of breath.    Cardiovascular: Negative for chest pain and leg swelling.   Gastrointestinal: Negative for abdominal pain, nausea and vomiting.   Genitourinary: Negative.    Musculoskeletal: Negative.    Skin:  Negative for itching and rash.        Vital Signs for last 24 hours   Temp:  [36.4 °C (97.6 °F)-36.5 °C (97.7 °F)] 36.4 °C (97.6 °F)  Pulse:  [34-82] 73  Resp:  [13-27] 21  BP: ()/(59-86) 129/83  SpO2:  [94 %-100 %] 99 %    Hemodynamic parameters for last 24 hours  CVP:  [18 MM HG-57 MM HG] 38 MM HG    Respiratory Information for the last 24 hours       Physical Exam   Physical Exam  Vitals signs and nursing note reviewed.   Constitutional:       Appearance: Normal appearance. She is ill-appearing.   HENT:      Head: Normocephalic and atraumatic.      Right Ear: External ear normal.      Left Ear: External ear normal.      Nose: Nose normal.      Mouth/Throat:      Mouth: Mucous membranes are moist.   Eyes:      Pupils: Pupils are equal, round, and reactive to light.   Neck:      Musculoskeletal: No neck rigidity.   Cardiovascular:      Rate and Rhythm: Normal rate and regular rhythm.      Pulses: Normal pulses.      Heart sounds: Murmur present. Gallop present.    Pulmonary:      Effort: Pulmonary effort is normal. No respiratory distress.      Breath sounds: No wheezing or rales.      Comments: Bibasilar crackles  Chest:      Chest wall: No tenderness.   Abdominal:      General: Abdomen is flat. There is no distension.      Tenderness: There is no abdominal tenderness. There is no guarding or rebound.   Musculoskeletal:      Comments: 2+ bilateral pitting edema   Lymphadenopathy:      Cervical: No cervical adenopathy.   Skin:     General: Skin is warm and dry.      Capillary Refill: Capillary refill takes less than 2 seconds.   Neurological:      General: No focal deficit present.      Mental Status: She is alert and oriented to person, place, and time.      Cranial Nerves: No cranial nerve deficit.      Sensory: No sensory deficit.      Motor: No weakness.      Coordination: Coordination normal.   Psychiatric:         Mood and Affect: Mood normal.         Medications  Current Facility-Administered  Medications   Medication Dose Route Frequency Provider Last Rate Last Admin   • ondansetron (ZOFRAN) syringe/vial injection 4 mg  4 mg Intravenous Q4HRS PRN Pino Case M.D.   4 mg at 02/07/21 0318   • [START ON 2/8/2021] insulin regular human (HUMULIN/NOVOLIN R) 62.5 Units in  mL infusion per protocol  1-6 Units/hr Intravenous Continuous Susi Luis A.P.N.       • [START ON 2/8/2021] EPINEPHrine (Adrenalin) infusion 4 mg/250 mL (premix)  0-0.2 mcg/kg/min Intravenous Continuous Susi Luis A.P.N.       • [START ON 2/8/2021] norepinephrine (Levophed) infusion 8 mg/250 mL (premix)  0-30 mcg/min Intravenous Continuous Susi Luis A.P.N.       • [START ON 2/8/2021] dexmedetomidine (PRECEDEX) 400 mcg/100mL NS premix infusion  0-1.5 mcg/kg/hr Intravenous Continuous Susi Luis, A.P.N.       • [START ON 2/8/2021] aminocaproic acid (AMICAR) 6.69 g in  mL IV Bolus  100 mg/kg Intravenous Once Susi Luis A.P.N.       • [START ON 2/8/2021] aminocaproic acid (AMICAR) 5 g in  mL Infusion  1 g/hr Intravenous Once Susi Luis A.P.N.       • butalbital/apap/caffeine -40 mg (Fioricet) per tablet 1 Tab  1 Tab Oral Q6HRS PRN Pino Case M.D.   1 Tab at 02/07/21 1218   • potassium chloride (KCL) ivpb 10 mEq  10 mEq Intravenous Once Pino Case M.D. 100 mL/hr at 02/07/21 1214 10 mEq at 02/07/21 1214   • heparin infusion 25,000 units in 500 mL 0.45% NACL  0-30 Units/kg/hr Intravenous Continuous Pino Case M.D. 23.8 mL/hr at 02/07/21 0651 18 Units/kg/hr at 02/07/21 0651   • heparin injection 2,600 Units  40 Units/kg Intravenous PRN Pino Case M.D.       • K+ Scale: Goal of 5  1 Each Intravenous Q6HRS Pino Case M.D.   1 Each at 02/07/21 1200   • trimethobenzamide (TIGAN) injection 200 mg  200 mg Intramuscular Q6HRS PRN Pino Case M.D.   200 mg at 02/07/21 0353   • amiodarone (NEXTERONE) 360 mg/200 mL infusion  0.5-1 mg/min Intravenous  Continuous Pino Case M.D. 17 mL/hr at 02/07/21 1251 0.5 mg/min at 02/07/21 1251       Fluids    Intake/Output Summary (Last 24 hours) at 2/7/2021 1310  Last data filed at 2/7/2021 1200  Gross per 24 hour   Intake 1565.8 ml   Output 1005 ml   Net 560.8 ml       Laboratory          Recent Labs     02/06/21  1100 02/06/21  1100 02/07/21  0005 02/07/21  0500 02/07/21  1100   SODIUM 128*  --  126*  --  121*   POTASSIUM 3.8   < > 4.3 4.3 4.5   CHLORIDE 96  --  96  --  92*   CO2 21  --  21  --  19*   BUN 30*  --  27*  --  30*   CREATININE 1.54*  --  1.73*  --  2.04*   MAGNESIUM 1.7  --  1.7  --  2.2   CALCIUM 8.4*  --  8.2*  --  8.1*    < > = values in this interval not displayed.     Recent Labs     02/06/21  1100 02/07/21  0005 02/07/21  1100   GLUCOSE 131* 103* 94     Recent Labs     02/05/21  0903 02/05/21 2014 02/06/21  0736 02/07/21  0335   WBC 6.5 6.6 5.7 5.0   NEUTSPOLYS 67.80  --  60.00 52.40   LYMPHOCYTES 14.20*  --  21.10* 24.60   MONOCYTES 11.50  --  10.60 12.00   EOSINOPHILS 5.20  --  6.70 9.40*   BASOPHILS 0.80  --  1.10 1.20     Recent Labs     02/05/21  0903 02/05/21 2014 02/06/21  0450 02/06/21  0736 02/07/21  0335   RBC 3.36* 3.39*  --  3.34* 3.36*   HEMOGLOBIN 9.1* 9.3*  --  9.1* 9.1*   HEMATOCRIT 28.5* 28.7*  --  28.3* 28.6*   PLATELETCT 219 209  --  211 204   PROTHROMBTM 19.9*  --  21.2*  --  21.4*   APTT 42.4*  --   --   --   --    INR 1.64*  --  1.77*  --  1.79*       Imaging  X-Ray:  I have personally reviewed the images and compared with prior images.    Assessment/Plan  * Cardiogenic shock (HCC)  Assessment & Plan  Presented with elevated lactate, RUSH, congestive hepatopathy, volume overload  Echo demonstrated severe RV dysfunction, pulmonary artery hypertension, and severe mitral stenosis with preserved LVEF    Plan:  Euvolemia  Rate and rhythm control of atrial fibrillation  Surgery 2/8/2021 with CTS for mitral valve      RUSH (acute kidney injury) (HCC)- (present on  admission)  Assessment & Plan  Cardiorenal  Significant net negative since admission  Improved with diuresis     Plan:  Avoid nephrotoxins as able  Renally dose adjust medications  Strict I/O monitoring  2/7 lasix mg x 1 for low UOP, net + yesterday    Mitral stenosis- (present on admission)  Assessment & Plan  ECHO re-demonstrates known severe rheumatic MS    Plan:  OR 2/8/21 with CTS  Maintain sinus rhythm  Euvolemia     Headache  Assessment & Plan  2/7 non-focal neuro exam, CT head negative    Plan:  Fiorcet prn    Atrial fibrillation (HCC)  Assessment & Plan  Presented in shock with aDameonfib RVR in the setting of RV failure, PAH, severe MS and volume overload  First slowed then converted with digoxin x 1  Heparin gtt  Amio gtt   Mg 2, K 4    Bilirubinemia- (present on admission)  Assessment & Plan  Congestive  Improved with diuresis  RUQ ultrasound reassuring  Amylase/lipase normal    Plan:  Daily monitoring    Hyponatremia- (present on admission)  Assessment & Plan  Hypervolemic   Free water restriction  Monitor closely    Pulmonary hypertension (HCC)- (present on admission)  Assessment & Plan  Presented in RV failure  Maintain normal sinus rhythm   Aim for euvoluemia   Avoid hypoxia, hypercapnia, and acidosis as able       VTE:  Heparin  Ulcer: Not Indicated  Lines: Central Line  Ongoing indication addressed and Arterial Line  Ongoing indication addressed    I have performed a physical exam and reviewed and updated ROS and Plan today (2/7/2021). In review of yesterday's note (2/6/2021), there are no changes except as documented above.     Discussed patient condition and risk of morbidity and/or mortality with Hospitalist, Family, RN, RT, Pharmacy, Charge nurse / hot rounds and Patient  The patient remains critically ill.  Critical care time = 46 minutes in directly providing and coordinating critical care and extensive data review.  No time overlap and excludes procedures.

## 2021-02-07 NOTE — PROGRESS NOTES
"Notified Dr. May of pt's low uo and firm distended abdomen despite frequent stools. She stated, \"ok to allow for low uo for tonight and will hold off on bowel regimen as to not give pt diarrhea.\"    Pt VSS. Monitoring continues.  "

## 2021-02-07 NOTE — PROGRESS NOTES
0715- reported to dr Case that pt H/A is still present and worsening since yesterday, now with nausea. Also, reported pt increase in b/p to the 130-140s systolic and the rate change into bigeminy. Awaiting new orders.     Orders for pain meds and a CT placed.     0820- transport down to CT with nurse on monitor, pt tolerated well.

## 2021-02-07 NOTE — ASSESSMENT & PLAN NOTE
Echo with RV overload and severely dilated right ventricle with reduced right ventricular systolic function due to rheumatic mitral stenosis  I am titrating norepinephrine to keep mean arterial pressure greater than 65

## 2021-02-07 NOTE — PROGRESS NOTES
Planning MVR, TV repair vs replacement with FLORENTINO tomorrow at 0730. All questions and concerns were addressed.

## 2021-02-07 NOTE — PROGRESS NOTES
Cardiology Follow Up Progress Note    Date of Service  2/7/2021    Attending Physician  Pino Case M.D.    HPI  Carolyn Hauser is a 40 y.o. female admitted 2/3/2021 with PMH of severe rheumatic mitral stenosis secondary pulmonary hypertension and right heart failure and recent Covid 19 infection pending mitral valve surgery presents with cardiogenic shock, paroxysmal atrial flutter, RUSH and lactic acidosis.  Converted to NSR.    Interim Events  2/3: /66.  Pulse 92.  Sinus.  Tired somewhat short of breath but feeling better.  No chest pain.  2/4: /58.  Pulse 62.  Sinus.  Feels better.  2/5: /56.  Pulse 78.  Sinus.  PVCs.  No S OB.  U0 5300.  2/6: /75.  Pulse 70.  Sinus.  No symptoms.  UO 3300 cc.  2/7: /73.  Pulse 62.  Sinus.  Ventricular bigeminy.  No cardiac symptoms.  Lasix held for worsening creatinine.    Review of Systems  Review of Systems   Constitutional: Positive for fatigue.   Respiratory: Negative for chest tightness and shortness of breath.    Cardiovascular: Negative for palpitations.   Neurological: Positive for weakness. Negative for dizziness and light-headedness.       Vital signs in last 24 hours  Temp:  [36.4 °C (97.6 °F)-36.5 °C (97.7 °F)] 36.4 °C (97.6 °F)  Pulse:  [60-84] 65  Resp:  [13-27] 24  BP: ()/(59-86) 116/74  SpO2:  [94 %-99 %] 98 %    Physical Exam  Physical Exam  Constitutional:       General: She is not in acute distress.     Comments:     Neck:      Vascular: JVD present.      Comments: Markedly elevated JVP  Cardiovascular:      Rate and Rhythm: Normal rate and regular rhythm.      Heart sounds: S1 normal and S2 normal. Murmur present. No friction rub. No gallop.    Pulmonary:      Effort: Pulmonary effort is normal.      Breath sounds: Normal breath sounds. No wheezing, rhonchi or rales.   Musculoskeletal:      Right lower leg: No edema.      Left lower leg: No edema.   Skin:     General: Skin is warm and dry.   Neurological:      Mental  Status: She is oriented to person, place, and time.   Psychiatric:         Behavior: Behavior normal.         Lab Review  Lab Results   Component Value Date/Time    WBC 5.0 02/07/2021 03:35 AM    RBC 3.36 (L) 02/07/2021 03:35 AM    HEMOGLOBIN 9.1 (L) 02/07/2021 03:35 AM    HEMATOCRIT 28.6 (L) 02/07/2021 03:35 AM    MCV 85.1 02/07/2021 03:35 AM    MCH 27.1 02/07/2021 03:35 AM    MCHC 31.8 (L) 02/07/2021 03:35 AM    MPV 10.5 02/07/2021 03:35 AM      Lab Results   Component Value Date/Time    SODIUM 126 (L) 02/07/2021 12:05 AM    POTASSIUM 4.3 02/07/2021 05:00 AM    CHLORIDE 96 02/07/2021 12:05 AM    CO2 21 02/07/2021 12:05 AM    GLUCOSE 103 (H) 02/07/2021 12:05 AM    BUN 27 (H) 02/07/2021 12:05 AM    CREATININE 1.73 (H) 02/07/2021 12:05 AM      Lab Results   Component Value Date/Time    ASTSGOT 38 02/04/2021 04:44 AM    ALTSGPT 20 02/04/2021 04:44 AM     Lab Results   Component Value Date/Time    TROPONINT 102 (H) 02/03/2021 01:10 AM       No results for input(s): NTPROBNP in the last 72 hours.    Cardiac Imaging and Procedures Review  EKG:  My personal interpretation of the EKG dated 2/3/2021 is normal sinus rhythm, rate 94.  Right ventricular hypertrophy.    Rhythm: My personal interpretation the rhythm dated 2/7/2021 normal sinus rhythm ventricular bigeminy.    Echocardiogram: 2/3/2021  Compared to the images of the prior study done 12/15/2020, the RV   appears larger.  Left ventricular ejection fraction is visually estimated to be 55%.  Abnormal septal motion consistent with right ventricular (RV) volume   overload and/or elevated RV end-diastolic pressure.  Severely dilated right ventricle.  Reduced right ventricular systolic   function.  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  Tong score calcualted at 14.  Mean transvalvular gradient is 17 mmHg at a heart rate of 119 BPM.    Imaging  Chest X-Ray: 2/3/2021  1.  New left IJV central line tip projects in satisfactory position. No pneumothorax.  2.   Cardiomegaly with diffuse interstitial edema or pneumonia pattern.    Assessment  1.  Cardiogenic shock, biventricular failure, valvular due to severe mitral stenosis.  2.  Pulmonary hypertension.  3.  Paroxysmal atrial flutter.  4.  Lactic acidosis.  5.  RUSH.  6.  Hyponatremia.  7.  Hypoglycemia.  8.  Hyperbilirubinemia.  9.  Anemia.    Recommendation Discussion  1.  Remains clinically improved and hemodynamically stable.  2.  Continue IV amiodarone.  3.  Lasix held for slightly worsening renal function.  4.  Optimize electrolytes K>4.0,Mg>2.0.  5.  Continue IV heparin.  6.  CT surgery for mitral valve replacement tentatively scheduled a.m. 2/8/2021.    Thank you for allowing me to participate in the care of this patient.    Please contact me with any questions.    Shahzad So M.D.   Cardiologist, [facility]  [phone]

## 2021-02-08 PROBLEM — Z99.11 ENCOUNTER FOR WEANING FROM VENTILATOR (HCC): Status: ACTIVE | Noted: 2021-01-01

## 2021-02-08 PROBLEM — G93.40 ACUTE ENCEPHALOPATHY: Status: ACTIVE | Noted: 2021-01-01

## 2021-02-08 NOTE — ANESTHESIA PREPROCEDURE EVALUATION
Relevant Problems   NEURO   (+) Headache      CARDIAC   (+) Atrial fibrillation (HCC)   (+) Pulmonary hypertension (HCC)   (+) Rheumatic heart failure (congestive) (HCC)         (+) RUSH (acute kidney injury) (HCC)       Physical Exam    Airway   Mallampati: II  TM distance: >3 FB  Neck ROM: full       Cardiovascular - normal exam  Rhythm: regular  Rate: normal  (-) murmur     Dental - normal exam           Pulmonary - normal exam  Breath sounds clear to auscultation     Abdominal    Neurological - normal exam                 Anesthesia Plan    ASA 4   ASA physical status 4 criteria: severe valve dysfunction    Plan - general       Airway plan will be ETT        Induction: intravenous    Postoperative Plan: Postoperative administration of opioids is intended.    Pertinent diagnostic labs and testing reviewed    Informed Consent:    Anesthetic plan and risks discussed with patient.    Use of blood products discussed with: patient whom consented to blood products.

## 2021-02-08 NOTE — ANESTHESIA PROCEDURE NOTES
Airway    Date/Time: 2/8/2021 7:50 AM  Performed by: Se Virgen M.D.  Authorized by: Se Virgen M.D.     Location:  OR  Urgency:  Elective  Indications for Airway Management:  Anesthesia      Spontaneous Ventilation: absent    Sedation Level:  Deep  Preoxygenated: Yes    Patient Position:  Sniffing  Final Airway Type:  Endotracheal airway  Final Endotracheal Airway:  ETT  Cuffed: Yes    Technique Used for Successful ETT Placement:  Direct laryngoscopy    Insertion Site:  Oral  Blade Type:  Case  Laryngoscope Blade/Videolaryngoscope Blade Size:  2  ETT Size (mm):  7.5  Measured from:  Teeth  ETT to Teeth (cm):  21  Placement Verified by: auscultation and capnometry    Cormack-Lehane Classification:  Grade I - full view of glottis  Number of Attempts at Approach:  1

## 2021-02-08 NOTE — ANESTHESIA PROCEDURE NOTES
Central Venous Line  Performed by: Se Virgen M.D.  Authorized by: Se Virgen M.D.     Start Time:  2/8/2021 8:16 AM      provider hand hygiene performed prior to central venous catheter insertion, all 5 sterile barriers used (gloves, gown, cap, mask, large sterile drape) during central venous catheter insertion and skin prep agent completely dried prior to procedure    Patient Position:  Trendelenburg  Laterality:  Right  Site:  Internal jugular  Prep:  Chlorhexidine  Catheter Size:  7.5 Fr  Catheter Type:  Introducer  Number of Lumens:  Double lumen  target vein identified, needle advanced into vein and blood aspirated and guidewire advanced into vein    Seldinger Technique?: Yes    Ultrasound-Guided: ultrasound-guided  Image captured, interpreted and electronically stored.  Sterile Gel and Probe Cover Used for Ultrasound?: Yes    Intravenous Verification: verified by ultrasound, venous blood return and chest x-ray pending    all ports aspirated, all ports flushed easily, guidewire was removed intact, biopatch was applied, line was sutured in place and dressing was applied    Events: patient tolerated procedure well with no complications    PA Catheter Placed?: Yes

## 2021-02-08 NOTE — PROGRESS NOTES
Critical Care Progress Note    Date of admission  2/3/2021    Chief Complaint  40 y.o. female admitted 2/3/2021 with altered mental status and hypoglycemia.  She has a history of mitral stenosis.    Hospital Course      2/3 admitted to ICU  2/4 improved diuresis, symptomatic improvement. CTS plans to perform surgery Monday 2/5 Hemodynamics, symptoms, and labs dramatically improved with diuresis   2/6 hold off further diuretics and allow to auto-diurese  2/7 net + 500 yesterday, 1 time lasix 20 mg to keep even-slightly negative. OR tomorrow with CTS  2/8 -    MVR with tricuspid valve repair today.      Interval Problem Update  Reviewed last 24 hour events:      SR  MVR today  Discussed with anesthesia and Dr. Webber  97.8  +1310 mL in the last 24  -580 mL since admit      Review of Systems  Review of Systems   Unable to perform ROS: Acuity of condition        Vital Signs for last 24 hours   Temp:  [35.9 °C (96.6 °F)-36.6 °C (97.9 °F)] 36.6 °C (97.9 °F)  Pulse:  [60-91] 79  Resp:  [12-29] 27  BP: ()/(40-88) 86/58  SpO2:  [92 %-100 %] 92 %    Hemodynamic parameters for last 24 hours  CVP:  [13 MM HG-45 MM HG] 13 MM HG  PCWP:  [25 MM HG-34 MM HG] 34 MM HG  CO:  [4.5-6.8] 4.5  CI:  [2.6-3.9] 2.6    Respiratory Information for the last 24 hours  Vent Mode: ASV  PEEP/CPAP: 5  MAP: 11  Control VTE (exp VT): 281    Physical Exam   Physical Exam  Constitutional:       Appearance: She is not diaphoretic.      Comments: On ventilator   HENT:      Head: Normocephalic and atraumatic.      Right Ear: External ear normal.      Left Ear: External ear normal.      Nose: Nose normal.      Mouth/Throat:      Mouth: Mucous membranes are moist.      Pharynx: Oropharynx is clear.   Eyes:      Conjunctiva/sclera: Conjunctivae normal.      Pupils: Pupils are equal, round, and reactive to light.   Neck:      Musculoskeletal: Normal range of motion and neck supple.   Cardiovascular:      Pulses: Normal pulses.      Heart sounds: No  gallop.       Comments: Sinus rhythm  Pulmonary:      Breath sounds: Rales (Scattered crackles) present. No wheezing.   Abdominal:      General: Bowel sounds are normal. There is no distension.      Tenderness: There is no abdominal tenderness. There is no guarding.   Musculoskeletal: Normal range of motion.      Comments: No clubbing or cyanosis   Skin:     General: Skin is warm and dry.      Capillary Refill: Capillary refill takes less than 2 seconds.   Neurological:      Comments: Sedated         Medications  Current Facility-Administered Medications   Medication Dose Route Frequency Provider Last Rate Last Admin   • Respiratory Therapy Consult   Nebulization Continuous RT Herminia C Foreign, P.A.-C.       • NS infusion   Intravenous Continuous Herminia C Foreign, P.A.-C. 10 mL/hr at 02/08/21 1340 New Bag at 02/08/21 1340   • calcium CHLORIDE 1,000 mg in  mL IVPB  1,000 mg Intravenous Once PRN Herminia C Foreign, P.A.-C.       • magnesium sulfate in D5W IVPB premix 1 g  1 g Intravenous DAILY Herminia C Foreign, P.A.-C. 100 mL/hr at 02/08/21 1419 1 g at 02/08/21 1419   • K+ Scale: Goal of 4.5  1 Each Intravenous Q6HRS Herminia C Foreign, P.A.-C.   1 Each at 02/08/21 1300   • Pharmacy Consult Request ...Pain Management Review 1 Each  1 Each Other PHARMACY TO DOSE Herminia C Foreign, P.A.-C.       • acetaminophen (TYLENOL) tablet 1,000 mg  1,000 mg Oral Q6HRS Herminia C Foreing, P.A.-C.       • senna-docusate (PERICOLACE or SENOKOT S) 8.6-50 MG per tablet 2 Tab  2 Tab Oral BID Herminia C Foreign, P.A.-C.        And   • [START ON 2/9/2021] polyethylene glycol/lytes (MIRALAX) PACKET 1 Packet  1 Packet Oral DAILY Herminia C Foreign, P.A.-C.        And   • [START ON 2/10/2021] magnesium hydroxide (MILK OF MAGNESIA) suspension 30 mL  30 mL Oral DAILY Herminia C Foreign, P.A.-C.        And   • bisacodyl (DULCOLAX) suppository 10 mg  10 mg Rectal QDAY PRN ANTONIA ValenciaASOUTH.       • [START ON 2/9/2021] omeprazole (PRILOSEC) capsule 20 mg  20 mg Oral DAILY Herminia Carrasquillo,  P.A.-C.       • electrolyte-A (PLASMALYTE-A) infusion   Intravenous PRN Herminia C Foreign, P.A.-C.       • vancomycin 1000 mg in 250 mL NS IVPB Premix  1 g Intravenous Once Herminia C Foreign, P.A.-C.       • mupirocin (BACTROBAN) 2 % ointment 1 Application  1 Application Topical BID Herminia C Foreign, P.A.-C.       • [START ON 2/9/2021] aspirin EC (ECOTRIN) tablet 81 mg  81 mg Oral DAILY Herminia C Foreign, P.A.-C.       • [START ON 2/9/2021] MD Alert...Warfarin per Pharmacy   Other PHARMACY TO DOSE Herminia C Foreign, P.A.-C.       • clevidipine (CLEVIPREX) IV emulsion  1-21 mg/hr Intravenous Continuous Herminia C Foreign, P.A.-C.   Stopped at 02/08/21 1300   • nitroglycerin 50 mg in D5W 250 ml infusion  0-100 mcg/min Intravenous Continuous Herminia C Foreign, P.A.-C.   Stopped at 02/08/21 1300   • oxyCODONE immediate-release (ROXICODONE) tablet 5 mg  5 mg Oral Q3HRS PRN Herminia C Foreign, P.A.-C.       • oxyCODONE immediate release (ROXICODONE) tablet 10 mg  10 mg Oral Q3HRS PRN Herminia C Foreign, P.A.-C.       • fentaNYL (SUBLIMAZE) injection 50 mcg  50 mcg Intravenous Q3HRS PRN Herminia C Foreign, P.A.-C.       • traMADol (ULTRAM) 50 MG tablet 50 mg  50 mg Oral Q4HRS PRN Herminia C Foreign, P.A.-C.       • midazolam (Versed) 2 MG/2ML injection 2 mg  2 mg Intravenous Q HOUR PRN Herminia C Foreign, P.A.-C.       • dexmedetomidine (PRECEDEX) 400 mcg/100mL NS premix infusion  0-1.5 mcg/kg/hr Intravenous Continuous Herminia C Foreign, P.A.-C.   Stopped at 02/08/21 1426   • sodium bicarbonate 8.4 % injection 50 mEq  50 mEq Intravenous Q HOUR PRN Herminia C Foreign, P.A.-C.       • morphine (pf) 10 mg/mL injection 4 mg  4 mg Intravenous Q HOUR PRN Herminia C Foreign, P.A.-C.       • ondansetron (ZOFRAN) syringe/vial injection 8 mg  8 mg Intravenous Q6HRS PRN Herminia C Foreign, P.A.-C.        Or   • prochlorperazine (COMPAZINE) injection 10 mg  10 mg Intravenous Q6HRS PRN Herminia C Foreign, P.A.-C.        Or   • promethazine (PHENERGAN) suppository 25 mg  25 mg Rectal Q6HRS PRN Herminia C Foreign, P.A.-C.       • mag  hydrox-al hydrox-simeth (MAALOX PLUS ES or MYLANTA DS) suspension 30 mL  30 mL Oral Q4HRS PRN Herminia C Foreign, P.A.-C.       • diphenhydrAMINE (BENADRYL) tablet/capsule 25 mg  25 mg Oral HS PRN - MR X 1 Herminia C Foreign, P.A.-C.       • insulin regular human (HUMULIN/NOVOLIN R) 62.5 Units in  mL infusion per protocol  1-6 Units/hr Intravenous Continuous Herminia C Foreign, P.A.-C. 4 mL/hr at 02/08/21 1608 1 Units/hr at 02/08/21 1608    And   • insulin regular (HumuLIN R,NovoLIN R) injection  0-10 Units Intravenous PRN Herminia C Foreign, P.A.-C.   1 Units at 02/08/21 1610    And   • dextrose 50% (D50W) injection 50 mL  50 mL Intravenous PRN Herminia C Foreign, P.A.-C.       • insulin lispro (HumaLOG) injection  0-20 Units Subcutaneous PRN Herminia C Foreign, P.A.-C.       • EPINEPHrine (Adrenalin) infusion 4 mg/250 mL (premix)  0-0.2 mcg/kg/min Intravenous Continuous Herminia C Foreign, P.A.-C. 12.9 mL/hr at 02/08/21 1312 0.05 mcg/kg/min at 02/08/21 1312   • norepinephrine (Levophed) infusion 8 mg/250 mL (premix)  0-30 mcg/min Intravenous Continuous Herminia C Foreign, P.A.-C. 7.5 mL/hr at 02/08/21 1642 4 mcg/min at 02/08/21 1642   • electrolyte-A (PLASMALYTE-A) (BOLUS) infusion 1,000 mL  1,000 mL Intravenous PRN Viola Brannon, A.P.N.       • albumin human 5% solution 12.5 g  12.5 g Intravenous PRN Viola Brannon, A.P.N.       • butalbital/apap/caffeine -40 mg (Fioricet) per tablet 1 Tab  1 Tab Oral Q6HRS PRN Pino Case M.D.   1 Tab at 02/07/21 1218   • amiodarone (NEXTERONE) 360 mg/200 mL infusion  0.5-1 mg/min Intravenous Continuous Pino Case M.D. 17 mL/hr at 02/07/21 2338 0.5 mg/min at 02/07/21 2338       Fluids    Intake/Output Summary (Last 24 hours) at 2/8/2021 1702  Last data filed at 2/8/2021 1500  Gross per 24 hour   Intake 5552.29 ml   Output 1500 ml   Net 4052.29 ml       Laboratory  Recent Labs     02/08/21  1256 02/08/21  1327 02/08/21  1331   ISTATAPH 7.320* 7.301* 7.344*   ISTATAPCO2 39.5* 35.8 33.5   ISTATAPO2 368*  335* 251*   ISTATATCO2 22 19* 19*   ZRAEXOP8LPX 100* 100* 100*   ISTATARTHCO3 20.3 17.6 18.2   ISTATARTBE -5* -8* -7*   ISTATTEMP 97.0 F 35.0 C 35.0 C   ISTATFIO2 100 100 100   ISTATSPEC Arterial Arterial Arterial   ISTATAPHTC 7.333* 7.329* 7.373*   [id number] 363* 325* 242*         Recent Labs     02/07/21  0005 02/07/21  0005 02/07/21 1100 02/07/21 1100 02/07/21 2230 02/08/21 0411 02/08/21  1255   SODIUM 126*  --  121*  --  128*  --   --    POTASSIUM 4.3   < > 4.5   < > 4.3 4.4 4.2   CHLORIDE 96  --  92*  --  96  --   --    CO2 21  --  19*  --  23  --   --    BUN 27*  --  30*  --  28*  --   --    CREATININE 1.73*  --  2.04*  --  2.02*  --   --    MAGNESIUM 1.7  --  2.2  --  2.3  --  3.0*   CALCIUM 8.2*  --  8.1*  --  8.3*  --   --     < > = values in this interval not displayed.     Recent Labs     02/07/21 0005 02/07/21 1100 02/07/21  2230   GLUCOSE 103* 94 105*     Recent Labs     02/07/21 0335 02/07/21 2230 02/08/21 0411   WBC 5.0 4.8 4.8   NEUTSPOLYS 52.40 58.90 55.80   LYMPHOCYTES 24.60 20.70* 20.20*   MONOCYTES 12.00 11.30 12.00   EOSINOPHILS 9.40* 7.70* 10.30*   BASOPHILS 1.20 0.80 1.30     Recent Labs     02/07/21 0335 02/07/21 2230 02/08/21  0000 02/08/21 0411 02/08/21  1255   RBC 3.36* 3.33*  --  3.14*  --    HEMOGLOBIN 9.1* 9.1*  --  8.6*  --    HEMATOCRIT 28.6* 28.5*  --  26.9*  --    PLATELETCT 204 209  --  206 120*   PROTHROMBTM 21.4*  --  20.1* 19.5* 19.8*   APTT  --   --  >240.0*  --  69.6*   INR 1.79*  --  1.66* 1.60* 1.63*       Imaging  X-Ray:  I have personally reviewed the images and compared with prior images. and My impression is: Cardiomegaly.  Increased extravascular lung water.    Assessment/Plan  * Rheumatic mitral stenosis- (present on admission)  Assessment & Plan  S/P MVR with 29 mm Medtronic Mosaic porcine valve and repair of the tricuspid valve with 38 mm Chavez flexible annuloplasty band on 2/8 by Dr. Webber  Left sided Maze procedure with MARK ligation on 2/8 by   Akbar    Encounter for weaning from ventilator (HCC)  Assessment & Plan  Intubated 2/8 for cardiac surgery  All of the appropriate ventilator bundles are in place  Continue vent support  Wean ventilator based upon cardiopulmonary status    Cardiogenic shock (HCC)  Assessment & Plan  Echo with RV overload and severely dilated right ventricle with reduced right ventricular systolic function due to rheumatic mitral stenosis  I am titrating epinephrine and norepinephrine based upon hemodynamic parameters    Atrial fibrillation (HCC)  Assessment & Plan  Optimize potassium and magnesium  Check thyroid function  Continue amiodarone    Acute on chronic right-sided heart failure (HCC)  Assessment & Plan  Echo with RV overload and severely dilated right ventricle with reduced right ventricular systolic function due to rheumatic mitral stenosis    RUSH (acute kidney injury) ([facility])- (present on admission)  Assessment & Plan  Suspect cardiorenal  Monitor renal function and urine output  Avoid nephrotoxins and renal dose medications    Pulmonary hypertension (HCC)- (present on admission)  Assessment & Plan  Maintain euvolemia and begin diuresis when clinically appropriate       VTE:  Contraindicated  Ulcer: PPI  Lines: Central Line  Ongoing indication addressed, Arterial Line  Ongoing indication addressed and Echols Catheter  Ongoing indication addressed    I have performed a physical exam and reviewed and updated ROS and Plan today (2/8/2021). In review of yesterday's note (2/7/2021), there are no changes except as documented above.     I have assessed and reassessed her respiratory status with ventilator adjustments and spontaneous breathing trials, ventilator waveforms, airway mechanics, blood pressure, hemodynamics, cardiovascular status with titration of vasopressors and her neurologic status.  She is at increased risk for worsening respiratory and cardiovascular system dysfunction.    Discussed patient condition and risk of  morbidity and/or mortality with RN, RT, Pharmacy, Charge nurse / hot rounds, QA team and CVS     The patient remains critically ill.  Critical care time = 40 minutes in directly providing and coordinating critical care and extensive data review.  No time overlap and excludes procedures.    Adal Molina MD  Pulmonary and Critical Care Medicine

## 2021-02-08 NOTE — PROGRESS NOTES
No interval changes in H&P.  Plan for mitral valve replacement, tricuspid valve repair, MAZE, FLORENTINO.

## 2021-02-08 NOTE — OP REPORT
DATE OF SERVICE:  02/08/2021     REFERRING PHYSICIAN:  Shahzad So MD     PREOPERATIVE DIAGNOSES:  Severe symptomatic mitral stenosis, severe tricuspid   regurgitation, acute on chronic left ventricular systolic and diastolic   failure, nonischemic dilated cardiomyopathy, atrial fibrillation, severe   pulmonary hypertension, acute kidney injury.     POSTOPERATIVE DIAGNOSES:  Severe symptomatic mitral stenosis, severe tricuspid   regurgitation, acute on chronic left ventricular systolic and diastolic   failure, nonischemic dilated cardiomyopathy, atrial fibrillation, severe   pulmonary hypertension, acute kidney injury.     PROCEDURES:  Mitral valve replacement (29 mm Medtronic Mosaic porcine valve),   tricuspid valve repair (38 mm Chavez flexible annuloplasty band), left-sided   maze procedure (Medtronic cryoprobe), left atrial appendage excision/ligation   and intraoperative transesophageal echocardiography.     SURGEON:  Alphonse Webber MD     FIRST ASSISTANT:  New Odonnell MD     SECOND ASSISTANT:  Herminia Carrasquillo PA-C     ANESTHESIOLOGIST:  Se Virgen MD     ANESTHESIA:  General endotracheal.     ESTIMATED BLOOD LOSS:  Minimal.     DRAINS:  Mediastinal chest tubes x2 (32-[place] straight and angled).     MISCELLANEOUS: Temporary epicardial atrial and ventricular pacemaker wires.     COMPLICATIONS:  None.     INDICATIONS:  The patient is a 40-year-old female who was admitted to the   hospital for treatment of acute congestive heart failure.  Echocardiography   showed severe mitral stenosis and severe tricuspid regurgitation.  The patient   was in atrial fibrillation.  The differences between tissue and mechanical   valves were extensively discussed with the patient and because of patient   preference and psychosocial issues, a tissue valve was implanted.     DESCRIPTION OF PROCEDURE:  The patient was brought to the operating room and   placed on the operating room table in the supine position.   After successful   induction of general anesthesia and endotracheal intubation, the patient was   prepped and draped in the usual sterile fashion.  Dr. Odonnell was the first   assistant during the operation. He provided retraction and exposure during the   operation.  Herminia Carrasquillo PA-C, was the second assistant and she closed the   sternal wound.  Intraoperative transesophageal echocardiography showed severe   mitral stenosis and severe tricuspid regurgitation.  Her left ventricular   ejection fraction was moderately depressed at approximately 40%.  An incision   was made from the sternal notch to the xiphoid.  The sternum was opened   longitudinally with a sternal saw.  Hemostasis was obtained with   electrocautery of the sternal edges.  The patient was systemically   heparinized.  The pericardium was opened longitudinally and tented anteriorly   with Ethibond stay stitches.  The aortic cannula was inserted first followed   by a superior vena cava and a single stage right atrial cannula and antegrade   cardioplegic cannula was placed in the ascending aorta.  Cardiopulmonary   bypass was instituted.  The aorta was cross clamped and the patient was given   1 liter of cold cardioplegia in an antegrade fashion.  There was prompt   cardiac arrest.  Ice slush was placed on the heart for further myocardial   protection.  A phrenic nerve protector pad was used.  From this point on,   cardioplegia was given in an antegrade fashion every 15-20 minutes while the   aorta was cross clamped.  The left atrial appendage was ligated at its base   and excised.  The stump was oversewn in two layers using 4-0 Prolene sutures.    The [employer] [place] Med[employer] cryoprobe was used to perform the left-sided maze procedure.    Epicardial ablations were done in the oblique sinus and between the superior   and inferior vena cavae.  A right atriotomy was performed.  The tricuspid   valve leaflets had fairly normal appearance.  A simple annuloplasty was    performed.  A #2-0 Ethibond stitches were then placed around the tricuspid   valve annulus from trigone to trigone.  A 38 mm Chavez flexible annuloplasty   band was then placed in the tricuspid valve annulus and secured in place with   the Ethibond stitches.  Testing of the repair with cold saline did not show   any tricuspid regurgitation.  The right atriotomy was closed in two layers   using #5-0 Prolene sutures.  Left atriotomy was performed just below the   interatrial groove.  Further ablations were performed with the cryoprobe   around the pulmonary veins and between the incision and the mitral valve   annulus at the 5 o'clock position.  The mitral valve leaflets and subvalvular   apparatus were very sclerotic and thickened.  The patient likely had rheumatic   heart disease.  Both leaflets were excised and the annulus was debrided   sharply with Metzenbaum scissors.  Pledgeted 2-0 Ethibond stitches were then   placed around the mitral valve annulus in a horizontal mattress fashion with   the pledgets in a subannular position.  A 29 mm Medtronic Mosaic porcine valve   was then placed in the mitral valve annulus and secured in place with the   Ethibond stitches utilizing the Cor-Knot device.  The valve was properly   positioned and there was free movement and coaptation of the three   bioprosthetic valve leaflets. Rewarming of the patient was initiated.  The   left atriotomy was closed in two layers using #4-0 Prolene sutures.  The   aortic cross-clamp was removed.  The aortic cross-clamp time was 114 minutes.    Total cardiopulmonary bypass time was 146 minutes.  Left ventricle was   de-aired in the usual fashion.  The carbon dioxide, which had been released   over the operative field during the operation, was discontinued. Straight and   angled 32-[place] chest tubes were placed in the mediastinum.  Temporary   epicardial atrial and ventricular pacemaker wires were inserted.  There was   spontaneous  conversion into sinus rhythm with first degree atrioventricular   block.  She was then externally paced in an atrial fashion at a rate of 80.    The antegrade cardioplegia cannula was removed. When she was adequately   warmed, she was slowly taken off cardiopulmonary bypass, which she tolerated   well.  A superior vena cava and right atrial cannulae were removed.  Protamine   was given to reverse the effects of heparin.  The aortic cannula was removed.   When adequate hemostasis had been obtained, the sternum was reapproximated   using size 5 sternal wires and the remainder of the incision was closed in 3   layers using Vicryl sutures.  Intraoperative transesophageal echocardiography   showed appropriately positioned and functioning mitral valve bioprosthesis   without any perivalvular or central leaks.  The mean mitral transvalvular   gradient was 2 mmHg.  There was a trace tricuspid regurgitation.  Left   ventricular ejection fraction was 40-50%.  There were no apparent   complications.  The patient tolerated the procedure well and left the   operating room in guarded condition.        ______________________________  Alphonse LE/CHRISTINA/GUILHERME    DD:  02/08/2021 12:35  DT:  02/08/2021 14:01    Job#:  [id number]

## 2021-02-08 NOTE — OR SURGEON
Immediate Post OP Note    PreOp Diagnosis: Severe symptomatic mitral stenosis, severe tricuspid regurgitation, acute on chronic left ventricular systolic and diastolic failure, nonischemic dilated cardiomyopathy, atrial fibrillation, severe pulmonary hypertension, acute kidney injury.    PostOp Diagnosis: Severe symptomatic mitral stenosis, severe tricuspid regurgitation, acute on chronic left ventricular systolic and diastolic failure, nonischemic dilated cardiomyopathy, atrial fibrillation, severe pulmonary hypertension, acute kidney injury.    Procedure(s):  REPLACEMENT, MITRAL VALVE (29 mm Medtronic Mosaic porcine valve)  REPAIR, TRICUSPID VALVE (38 mm Chavez flexible annuloplasty band)  L SIDED MAZE PROCEDURE (Medtronic cryoprobe)  MARK LIGATION  ECHOCARDIOGRAM, TRANSESOPHAGEAL    Surgeon(s):  Alphonse Webber M.D.    Assistants:  1.  New Odonnell M.D.  2.  Herminia Carrasquillo PA-C    Anesthesiologist/Type of Anesthesia:  Anesthesiologist: Se Virgen M.D.  Anesthesia Technician: Raphael Contreras; Rex Dinh/General    Surgical Staff:  Assistant: Herminia Carrasquillo P.A.-C.  Circulator: Mary Moore R.N.  Relief Circulator: Cece Og R.N.  Scrub Person: Olga Edwards; Trish Solitario    Specimens removed if any:  ID Type Source Tests Collected by Time Destination   A : left atrial ascending  Tissue Heart PATHOLOGY SPECIMEN Alphonse Webber M.D. 2/8/2021  9:33 AM    B : mitral valve  Tissue Heart PATHOLOGY SPECIMEN Alphonse Webber M.D. 2/8/2021 10:28 AM        Estimated Blood Loss: Minimal    Findings: Severe mitral stenosis, severe tricuspid regurgitation, atrial fibrillation, LVEF 40%.    Complications: None        2/8/2021 12:19 PM Alphonse Webber M.D.

## 2021-02-08 NOTE — ANESTHESIA POSTPROCEDURE EVALUATION
Patient: Carolyn Hauser    Procedure Summary     Date: 02/08/21 Room / Location: [facility] 02 / SURGERY [facility]    Anesthesia Start: 0733 Anesthesia Stop: 1306    Procedures:       REPLACEMENT, MITRAL VALVE (Chest)      REPAIR, TRICUSPID VALVE (Chest)      ECHOCARDIOGRAM, TRANSESOPHAGEAL (Mouth) Diagnosis: (MITRAL STENOSIS, TRICUSPID REGURGITATION)    Surgeons: Alphonse Webber M.D. Responsible Provider: Se Virgen M.D.    Anesthesia Type: general ASA Status: 4          Final Anesthesia Type: general  Last vitals  BP   Blood Pressure: 126/88, Arterial BP: 131/79    Temp   36.6 °C (97.8 °F)    Pulse   Pulse: 64   Resp   16    SpO2   97 %      Anesthesia Post Evaluation    Patient location during evaluation: ICU  Patient participation: complete - patient participated  Level of consciousness: obtunded/minimal responses  Pain score: 0    Airway patency: patent  Anesthetic complications: no  Cardiovascular status: adequate and hemodynamically stable  Respiratory status: acceptable  Hydration status: acceptable    PONV: none           Nurse Pain Score: 0 (NPRS)

## 2021-02-08 NOTE — ANESTHESIA TIME REPORT
Anesthesia Start and Stop Event Times     Date Time Event    2/8/2021 0733 Ready for Procedure     0733 Anesthesia Start     1306 Anesthesia Stop        Responsible Staff  02/08/21    Name Role Begin End    Se Virgen M.D. Anesth 0733 1306        Preop Diagnosis (Free Text):  Pre-op Diagnosis     MITRAL STENOSIS, TRICUSPID REGURGITATION        Preop Diagnosis (Codes):    Post op Diagnosis  Mitral stenosis  Tricuspid insufficiency    Premium Reason  Non-Premium    Comments:                                                                  MVR #29, Tricuspid annuloplasty, Femoral [place], FLORENTINO 2D  3D used to diagnose mitral stenosis and tricuspid insufficiency and guide repair and replacement.

## 2021-02-08 NOTE — PROGRESS NOTES
Updated Viola that dex has been off for an hour and patient is still not waking up, including no reflexes    Per Viola, update at 6 hour jorge luis if patient still not waking up,ok if patient does not meet extubation goal    Also updated that patient is fluid responsive. Ok to give additional liter of plasmalyte and 250ml of 5% albumin

## 2021-02-09 PROBLEM — Z99.11 ENCOUNTER FOR WEANING FROM VENTILATOR (HCC): Status: RESOLVED | Noted: 2021-01-01 | Resolved: 2021-01-01

## 2021-02-09 NOTE — RESPIRATORY CARE
Patient extubated to 4L NC with no complications(0540). No respiratory distress noted at this time. Titrated flow to 1.5L spo2 maintained above 94%. Patient denies sob. Will continue to monitor. Dr. Galeano aware

## 2021-02-09 NOTE — PROGRESS NOTES
Cardiovascular Surgery Progress Note    Name: Carolyn Hauser  MRN: [id number]  : 1980  Admit Date: 2/3/2021 12:55 AM  Procedure:  Procedure(s) and Anesthesia Type:     * REPLACEMENT, MITRAL VALVE - General     * REPAIR, TRICUSPID VALVE - General     * ECHOCARDIOGRAM, TRANSESOPHAGEAL - General  1 Day Post-Op    Vitals:  Patient Vitals for the past 8 hrs:   Temp SpO2 O2 Delivery Device O2 (LPM) Pulse Resp BP Weight FiO2%   2127 (!) 35.8 °C (96.4 °F) 93 % -- -- 73 12 (!) 93/53 -- --   21 0615 -- 93 % -- -- 71 14 -- -- --   21 0605 -- 94 % -- -- 69 12 (!) 67/44 -- --   21 0600 -- 94 % Silicone Nasal Cannula 2 69 12 -- -- --   21 0545 -- 98 % -- -- 65 13 -- -- --   21 0540 -- -- Silicone Nasal Cannula 4 69 12 -- -- --   21 0530 -- -- -- -- 68 13 -- -- --   21 0515 -- -- -- -- 68 (!) 11 (!) 74/53 -- --   21 0500 -- -- -- -- 69 12 -- -- --   21 0445 -- 97 % -- -- 70 12 (!) 79/ -- --   21 0430 -- 89 % -- -- 65 19 -- -- --   21 0426 -- -- -- -- -- -- -- -- 28 %   215 -- 98 % -- -- 67 16 (!) 79/47 -- --   21 040 35.9 °C (96.6 °F) 99 % Ventilator -- 64 16 -- -- 28 %   215 -- 99 % -- -- 63 16 -- -- --   210 -- 98 % -- -- 63 16 -- -- --   21 -- 99 % -- -- 63 16 (!) 82/48 -- --   21 -- 98 % -- -- 63 16 -- -- --   21 -- 98 % -- -- 63 16 -- -- --   21 -- 99 % Ventilator -- 62 16 -- 69.4 kg (153 lb) 28 %   21 -- -- -- -- -- -- -- -- 30 %   21 -- -- -- -- 62 16 (!) 83/50 -- --   21 -- 99 % -- -- 61 16 -- -- --   21 -- 99 % -- -- 61 16 -- -- --   21 -- 99 % -- -- 61 16 -- -- --   21 -- 97 % -- -- 62 16 -- -- --   21 -- 99 % -- -- 62 16 (!) 78/48 -- --   21 -- 98 % -- -- 61 17 -- -- --   21 -- 98 % -- -- 61 16 -- -- --   21 -- 98 % -- -- 61 16 -- -- --   21  -- 98 % -- -- 67 16 (!) 89/57 -- --   21 0000 36.3 °C (97.3 °F) 98 % Ventilator -- 73 16 -- -- 30 %   21 2345 -- 97 % -- -- 74 16 -- -- --   21 2330 -- 97 % -- -- 74 16 -- -- --   21 2315 -- 96 % -- -- 73 16 (!) 84/53 -- --   21 2300 -- 95 % -- -- 72 16 -- -- --     Temp (24hrs), Av.2 °C (97.2 °F), Min:35.8 °C (96.4 °F), Max:36.6 °C (97.9 °F)      Respiratory:  Vent Mode: APVCMV, Rate (breaths/min): 16, PEEP/CPAP: 5, PIP: 26, MAP: 12 Respiration: 12, Pulse Oximetry: 93 %     Chest Tube Drains:          Fluids:    Intake/Output Summary (Last 24 hours) at 2021 0645  Last data filed at 2021 0600  Gross per 24 hour   Intake 6216.59 ml   Output 2910 ml   Net 3306.59 ml     Admit weight: Weight: 63.5 kg (140 lb)  Current weight: Weight: 69.4 kg (153 lb) (21 0219)    Labs:  Recent Labs     21  2230 21  0411 21  1255 21  0330   WBC 4.8 4.8  --  16.6*   RBC 3.33* 3.14*  --  2.63*   HEMOGLOBIN 9.1* 8.6*  --  7.1*   HEMATOCRIT 28.5* 26.9*  --  22.4*   MCV 85.6 85.7  --  85.2   MCH 27.3 27.4  --  27.0   MCHC 31.9* 32.0*  --  31.7*   RDW 57.9* 58.8*  --  57.9*   PLATELETCT 209 206 120* 74*   MPV 10.3 11.0  --  11.0     Recent Labs     215 21   NEUTSPOLYS 52.40 58.90 55.80   LYMPHOCYTES 24.60 20.70* 20.20*   MONOCYTES 12.00 11.30 12.00   EOSINOPHILS 9.40* 7.70* 10.30*   BASOPHILS 1.20 0.80 1.30     Recent Labs     21  22321  1805 21  033   SODIUM 128*  --   --  132* 135   POTASSIUM 4.3   < > 4.8 5.3  5.3 5.0   CHLORIDE 96  --   --  98 101   CO2 23  --   --   23   GLUCOSE 105*  --   --  126* 136*   BUN 28*  --   --  28* 28*   CREATININE 2.02*  --   --  2.12* 2.28*   CALCIUM 8.3*  --   --  8.0* 8.2*    < > = values in this interval not displayed.     Recent Labs     21  0000 21  1255 21   APTT >240.0*  --  69.6*  --    INR 1.66*  1.60* 1.63* 1.43*       Medications:  • insulin NPH  5 Units      And   • insulin lispro  4 Units     • insulin lispro  0-15 Units     • magnesium sulfate  1 g Stopped (02/09/21 0600)   • Pharmacy Consult Request  1 Each     • acetaminophen  1,000 mg     • senna-docusate  2 Tab      And   • polyethylene glycol/lytes  1 Packet      And   • [START ON 2/10/2021] magnesium hydroxide  30 mL     • omeprazole  20 mg     • mupirocin  1 Application     • aspirin EC  81 mg     • MD Alert...Warfarin per Pharmacy            Ordered Medications:    ASA - Yes    Plavix - No; contraindicated because of On Coumadin / NOAC    Post-operative Beta Blockers - No; contraindicated because of Hypotension    Ace Inhibitor - No; contraindicated because of Hypotension    Statin - No; contraindicated because of No CAD        Exam:   Review of Systems   Constitutional: Positive for malaise/fatigue.   HENT: Negative.    Eyes: Negative.    Respiratory: Negative.    Cardiovascular: Positive for leg swelling.   Gastrointestinal: Negative.    Genitourinary: Negative.    Musculoskeletal: Negative.    Skin: Negative.    Neurological: Negative.    Endo/Heme/Allergies: Negative.    Psychiatric/Behavioral: Negative.        Physical Exam  Vitals signs reviewed.   HENT:      Head: Normocephalic.      Mouth/Throat:      Mouth: Mucous membranes are moist.   Eyes:      Pupils: Pupils are equal, round, and reactive to light.   Neck:      Musculoskeletal: Neck supple.   Cardiovascular:      Rate and Rhythm: Normal rate and regular rhythm.      Pulses: Normal pulses.   Pulmonary:      Effort: Pulmonary effort is normal.      Breath sounds: Examination of the right-lower field reveals decreased breath sounds. Examination of the left-lower field reveals decreased breath sounds. Decreased breath sounds present.   Abdominal:      Palpations: Abdomen is soft.   Musculoskeletal: Normal range of motion.   Skin:     General: Skin is warm.   Neurological:      General:  No focal deficit present.      Mental Status: She is alert and oriented to person, place, and time.   Psychiatric:         Mood and Affect: Mood normal.         Behavior: Behavior normal.         Thought Content: Thought content normal.         Judgment: Judgment normal.         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]     Priority: High   • Atrial fibrillation ([facility]) [I48.91]     Priority: Medium   • RUSH (acute kidney injury) ([facility]) [N17.9]     Priority: Medium   • Acute encephalopathy [G93.40]     Priority: Medium   • Acute on chronic right-sided heart failure (HCC) [I50.813]     Priority: Medium   • Rheumatic heart failure (congestive) (HCC) [I09.81]     Priority: Medium   • Pulmonary hypertension ([facility]) [I27.20]     Priority: Medium   • Hyponatremia [E87.1]     Priority: Low   • Bilirubinemia [E80.6]     Priority: Low   • Coagulopathy (HCC) [D68.9]     Priority: Low

## 2021-02-09 NOTE — PROGRESS NOTES
SBT x1 hour and 15 minutes. Parameters read to Dr. Galeano. Order to extubate. Patient extubated at 0540 to 4L NC

## 2021-02-09 NOTE — PROGRESS NOTES
Aime numbers assessed. Milrinone up to 0.625 mcg/kg/min. PAP 72/33. CI down to 1.7 and SVR up to 1200. 100 mL UO last hour, CVP down to 18. Other hemodynamics remain unchanged. Dr. Galeano at bedside. Continue to titrate up on milrinone, MD ordering 20 mg lasix. After lasix admin, notify MD of PAP and hemodynamics.

## 2021-02-09 NOTE — PROGRESS NOTES
2100- Bronch completed. Versed and morphine given per Dr. Small for sedation. APRN called to update with findings. Per Viola Brannon, she will leave treatment of pulmonary pressures and findings to critical care MD. Per Dr. Small, treat PA pressures using epi gtt and treat MAP with levo as needed.

## 2021-02-09 NOTE — CARE PLAN
Problem: Communication  Goal: The ability to communicate needs accurately and effectively will improve  Outcome: PROGRESSING AS EXPECTED     Problem: Safety  Goal: Will remain free from injury  Outcome: PROGRESSING AS EXPECTED  Goal: Will remain free from falls  Outcome: PROGRESSING AS EXPECTED     Problem: Infection  Goal: Will remain free from infection  Outcome: PROGRESSING AS EXPECTED     Problem: Venous Thromboembolism (VTW)/Deep Vein Thrombosis (DVT) Prevention:  Goal: Patient will participate in Venous Thrombosis (VTE)/Deep Vein Thrombosis (DVT)Prevention Measures  Outcome: PROGRESSING AS EXPECTED     Problem: Bowel/Gastric:  Goal: Normal bowel function is maintained or improved  Outcome: PROGRESSING AS EXPECTED  Goal: Will not experience complications related to bowel motility  Outcome: PROGRESSING AS EXPECTED     Problem: Knowledge Deficit  Goal: Knowledge of disease process/condition, treatment plan, diagnostic tests, and medications will improve  Outcome: PROGRESSING AS EXPECTED  Goal: Knowledge of the prescribed therapeutic regimen will improve  Outcome: PROGRESSING AS EXPECTED     Problem: Discharge Barriers/Planning  Goal: Patient's continuum of care needs will be met  Outcome: PROGRESSING AS EXPECTED     Problem: Respiratory:  Goal: Respiratory status will improve  Outcome: PROGRESSING AS EXPECTED     Problem: Fluid Volume:  Goal: Will maintain balanced intake and output  Outcome: PROGRESSING AS EXPECTED     Problem: Mobility  Goal: Risk for activity intolerance will decrease  Outcome: PROGRESSING AS EXPECTED     Problem: Skin Integrity  Goal: Risk for impaired skin integrity will decrease  Outcome: PROGRESSING AS EXPECTED     Problem: Pain Management  Goal: Pain level will decrease to patient's comfort goal  Outcome: PROGRESSING AS EXPECTED     Problem: Post Op Day 1 CABG/Heart Valve Replacement  Goal: Optimal care of the post op CABG/heart valve replacement Post Op Day 1  Outcome: PROGRESSING AS  EXPECTED     Problem: Post op day 2 CABG/Heart Valve Replacement  Goal: Optimal care of the post op CABG/heart valve replacement post op day 2  Outcome: PROGRESSING AS EXPECTED     Problem: Post Op Day 3 CABG/Heart Valve replacement  Goal: Optimal care of the post op CABG/Heart Valve replacement post op day 3  Outcome: PROGRESSING AS EXPECTED     Problem: Post Op Day 4 CABG/Heart Valve Replacement  Goal: Optimal care of the Post Op CABG/Heart Valve replacement Post Op Day 4  Outcome: PROGRESSING AS EXPECTED     Problem: Safety  Goal: Free from accidental injury  Outcome: PROGRESSING AS EXPECTED  Goal: Free from intentional harm  Outcome: PROGRESSING AS EXPECTED  Goal: Free from self harm  Outcome: PROGRESSING AS EXPECTED  Goal: Isolation Precautions for patient and staff safety  Outcome: PROGRESSING AS EXPECTED     Problem: Knowledge Deficit  Goal: Maintain or improve baseline circulation, motion and sensation  Outcome: PROGRESSING AS EXPECTED  Goal: Patient/Significant other demonstrates understanding of disease process, treatment plan, medications and discharge instructions  Outcome: PROGRESSING AS EXPECTED     Problem: Psychosocial needs  Goal: Spiritual needs incorporated in hospitalization  Outcome: PROGRESSING AS EXPECTED  Goal: Cultural needs incorporated in hospitalization  Outcome: PROGRESSING AS EXPECTED  Goal: Anxiety reduction  Outcome: PROGRESSING AS EXPECTED     Problem: Discharge Barriers/Planning  Goal: Patient's Continuum of care needs are met  Outcome: PROGRESSING AS EXPECTED     Problem: Skin Integrity  Goal: Skin Integrity is maintained or improved  Outcome: PROGRESSING AS EXPECTED     Problem: Risk for impaired circulation/Tissue perfusion  Goal: Optimal post surgical circulation/tissue perfusion  Outcome: PROGRESSING AS EXPECTED     Problem: Risk for Infection, Impaired Wound Healing  Goal: Remain free from signs and symptoms of infection  Outcome: PROGRESSING AS EXPECTED  Goal: Promotion of  Wound Healing and Drain Management  Outcome: PROGRESSING AS EXPECTED     Problem: Risk for Impaired Mobility--Activity Intolerance  Goal: Mobilize and/or Transfer Safely with Maximum [place]  Outcome: PROGRESSING AS EXPECTED  Goal: Activity Level appropriate for Discharge or Transfer  Outcome: PROGRESSING AS EXPECTED     Problem: Oxygenation/Respiratory Function  Goal: Patient will Achieve/Maintain Optimum Respiratory Rate/Effort  Outcome: PROGRESSING AS EXPECTED     Problem: Risk of Aspiration  Goal: Absence of aspiration  Outcome: PROGRESSING AS EXPECTED     Problem: Pain  Goal: Alleviation of Pain or a reduction in pain to the patient's comfort goal  Outcome: PROGRESSING AS EXPECTED     Problem: Hemodynamic Status  Goal: Stable hemodynamics, hemostasis, fluid/electrolyte balance and rhythm control  Outcome: PROGRESSING AS EXPECTED     Problem: Risk for Deep Vein Thrombosis/Venous Thromboembolism  Goal: DVT/VTE Prevention Measures in Place  Outcome: PROGRESSING AS EXPECTED  Goal: Patient participates in DVT/VTE Prevention Measures  Outcome: PROGRESSING AS EXPECTED     Problem: Positive DVT/VTE  Goal: Safe management of positive DVT/VTE  Outcome: PROGRESSING AS EXPECTED     Problem: Nutrition Deficit  Goal: Adequate Food and Fluid Intake  Outcome: PROGRESSING AS EXPECTED  Goal: Enteral Nutrition Management  Outcome: PROGRESSING AS EXPECTED  Goal: Parenteral Nutrition Management  Outcome: PROGRESSING AS EXPECTED     Problem: Self Care Deficit  Goal: Provide hygiene and grooming for the dependent patient  Outcome: PROGRESSING AS EXPECTED  Goal: Ability to bathe, groom and dress independently or with assistance  Outcome: PROGRESSING AS EXPECTED  Goal: Ability to feed and maintain oral hygiene independently or with assistance  Outcome: PROGRESSING AS EXPECTED  Goal: Ability to toilet independently or with assistance  Outcome: PROGRESSING AS EXPECTED     Problem: Elimination  Goal: Establishment and Maintenance of  regular bowel elimination  Outcome: PROGRESSING AS EXPECTED  Goal: Regular urinary elimination  Outcome: PROGRESSING AS EXPECTED     Problem: Pre Op  Goal: Optimal preparation for CABG/Heart Valve surgery  Outcome: MET     Problem: Day of surgery post CABG/Heart valve replacement  Goal: Stabilization in immediate post op period  Outcome: MET     Problem: Risk for injury related to physical restraint use  Goal: Safe and appropriate use of physical restraints. Restraints discontinued at the earliest possibility while ensuring patient safety.  Outcome: MET     Problem: Mechanical Ventilation  Goal: Safe management of artificial airway and ventilation  Outcome: MET  Goal: Ability to express needs and understand communication  Outcome: MET  Goal: Successful weaning off mechanical ventilator. Spontaneously maintains adequate gas exchange  Outcome: MET

## 2021-02-09 NOTE — PROGRESS NOTES
1245 Patient arrived to room, connected to arterial line, pulse ox, tele electrodes, and swan. [place] secured at 48cm. Verified with Dr. Virgen that swan has not been wedging.     Rt connected patient to ventilator    Report received from anaesthesiologist Dr. Virgen. Chon verified. Dr. Virgen also aware that systolic PA pressures are 80s-90s    Dr. Webber and Herminia Carrasquillo at bedside    EKG to bedside, labs sent, BG checked, CXR verified

## 2021-02-09 NOTE — PROGRESS NOTES
Cardiac Surgery RN Navigator Daily Rounding Note  Procedure Performed: Procedure(s):  REPLACEMENT, MITRAL VALVE  REPAIR, TRICUSPID VALVE  ECHOCARDIOGRAM, TRANSESOPHAGEAL     By Dr. Webber  1 Day Post-Op    Pertinent Overnight Events:    sugamadex given followed by vomiting and possible aspiration-patient bronched; now extubated    IVF & albumin given 2/8 and 20 mg IV lasix given overnight per Dr. Galeano for high PAP with good UOP overnight    1 unit PRBC's this am for low H&H  Creatinine increased to 2.28-watch    205 cc out of CT    On milrinone and levo-Epi gtt off    Pertinent neuro findings/needs: A&O; remains in bed due to ART line and recent extubation    Cardiac/hemodynamics:  Temp (24hrs), Av.2 °C (97.2 °F), Min:35.8 °C (96.4 °F), Max:36.6 °C (97.9 °F)    Heart rhythm: SR v wires in place  Temp:  [35.8 °C (96.4 °F)-36.6 °C (97.9 °F)] 35.8 °C (96.4 °F)  Pulse:  [61-91] 73  Resp:  [3-29] 12  BP: ()/(40-88) 93/53  SpO2:  [89 %-100 %] 93 %  CVP:  [13 MM HG-29 MM HG] 18 MM HG  PCWP:  [25 MM HG-47 MM HG] 36 MM HG  CO:  [3-6.8] 5.6  CI:  [1.7-3.9] 3.2   IV gtts:   •  insulin infusion for post-cardiac surgery protocol, Last Rate: 2 Units/hr (21 0552)    •  NS, Last Rate: 10 mL/hr at 21 1340    •  clevidipine, Last Rate: Stopped (21 1300)    •  nitroglycerin in D5W, Last Rate: Stopped (21 1300)    •  dexmedetomidine (PRECEDEX) infusion, Last Rate: Stopped (21 034)    •  EPINEPHrine (Adrenalin) infusion, Last Rate: Stopped (21 0013)    •  norepinephrine (Levophed) infusion, Last Rate: 5 mcg/min (21 0606)    •  Milrinone infusion, Last Rate: 0.75 mcg/kg/min (21)    •  amiodarone infusion, Last Rate: 0.5 mg/min (21 997)      12-hour chest tube output: 205 cc    /Weights:  Admit weight: Weight: 63.5 kg (140 lb)  Current Weight: Weight: 69.4 kg (153 lb) (21)  Weight change: 0.8 kg (1 lb 12.2 oz)    Intake/Output Summary (Last 24  hours) at 2/9/2021 0646  Last data filed at 2/9/2021 0600  Gross per 24 hour   Intake 6216.59 ml   Output 2910 ml   Net 3306.59 ml       Adequate urine output: yes    Respiratory:    Pertinent respiratory findings/needs: 2 L NC    GI findings/needs: Prior to surgery    Labs:  Recent Labs     02/07/21  2230 02/08/21  0411 02/08/21  1255 02/09/21  0330   WBC 4.8 4.8  --  16.6*   RBC 3.33* 3.14*  --  2.63*   HEMOGLOBIN 9.1* 8.6*  --  7.1*   HEMATOCRIT 28.5* 26.9*  --  22.4*   MCV 85.6 85.7  --  85.2   MCH 27.3 27.4  --  27.0   MCHC 31.9* 32.0*  --  31.7*   RDW 57.9* 58.8*  --  57.9*   PLATELETCT 209 206 120* 74*   MPV 10.3 11.0  --  11.0     Recent Labs     02/07/21 2230 02/07/21  2230 02/08/21  1805 02/08/21  2302 02/09/21  0330   SODIUM 128*  --   --  132* 135   POTASSIUM 4.3   < > 4.8 5.3  5.3 5.0   CHLORIDE 96  --   --  98 101   CO2 23  --   --  21 23   GLUCOSE 105*  --   --  126* 136*   BUN 28*  --   --  28* 28*   CREATININE 2.02*  --   --  2.12* 2.28*   CALCIUM 8.3*  --   --  8.0* 8.2*    < > = values in this interval not displayed.     INR:   Recent Labs     02/08/21  0000 02/08/21 0411 02/08/21  1255 02/09/21  0330   INR 1.66* 1.60* 1.63* 1.43*       Required Cardiac medications review:  ASA:  Yes  Plavix: No; contraindicated because of On Coumadin / NOAC  BB: No; contraindicated because of High risk for heart block  ACE/ARB: No; contraindicated because of Hypotension  Statin: no; no known CAD  Diuretic: no    LDA's necessity reviewed: YES    Thoughts and considerations for team rounding:    N/a    Discharge plan:    TBD  Placed anticoag referral today  CT surgery f/u in place

## 2021-02-09 NOTE — PROGRESS NOTES
1730 Updated Dr. Molina that patient is not waking up, no reflexes, and sluggish pupils. MD at bedside for assessment. Sugamadex suggested. Discussed with pharmacy and Dr. Virgen    1815 Dr. Virgen to come to bedside with Sugamadex    1834 Updated Viola that patient continues to have no reflex, sluggish pupils and does not withdraw. Updated that it has been discussed with Dr. Molina and Anesthesiologist Dr. Virgen to give Sugamadex to rule out that the patient is still paralyzed. Per Viola patient is too unstable for CT this evening and it would have to be reevaluated in the morning.

## 2021-02-09 NOTE — PROGRESS NOTES
Lung sounds upon assessment loud crackles in all lung fields. When given Suggamadex, patient appeared to vomit and potentially aspirated. CXR ordered per protocol. Dr. Small updated an MD requesting to prepare for bronch. Rt updated.     APRN Viola Brannon updated on patient's neurologic assessment as well as new respiratory findings as well as impending bronch. Per APRN, Dr. Small to determine patient's eligibility for weaning to extubate based on his assessment findings. Per APRN, no need to update if decision to wean or rest overnight. Also updated APRN regarding current gtts and [place] numbers- patient requiring norepi gtt and less responsive to fluids. Additionally art line and cuff pressures with 30 point difference. Per APRN titrate based on art line pressure and leave arterial line in place as long on any vasoactive drips and that she would expect patient to require vasopressors into day 1 post-op. Addressed mobility with femoral line and per APRN, do not mobilize patient while femoral art line is in place regardless of extubation status.

## 2021-02-09 NOTE — PROGRESS NOTES
Dr. Galeano at bedside. Update on [place] numbers and PA pressures with current epi and levo titrations. Plan to switch to Milrinone gtt once BMP results. Per MD, goal systolic PA pressure <50. May use levophed if needed to sustain MAP >65. Wean off Epi once milrinone started. RN to notify MD of [place] numbers after 1 hour to assess effectiveness of gtt. RN may titrate milrinone to achieve goal PA pressures.

## 2021-02-09 NOTE — PROGRESS NOTES
Critical Care Progress Note    Date of admission  2/3/2021    Chief Complaint  40 y.o. female admitted 2/3/2021 with altered mental status and hypoglycemia.  She has a history of mitral stenosis.    Hospital Course      2/3 admitted to ICU  2/4 improved diuresis, symptomatic improvement. CTS plans to perform surgery Monday 2/5 Hemodynamics, symptoms, and labs dramatically improved with diuresis   2/6 hold off further diuretics and allow to auto-diurese  2/7 net + 500 yesterday, 1 time lasix 20 mg to keep even-slightly negative. OR tomorrow with CTS  2/8 -    MVR with tricuspid valve repair today.  2/9 -    titrating milrinone and norepinephrine based upon hemodynamic parameters.  She is liberated from the ventilator.      Interval Problem Update  Reviewed last 24 hour events:      MVR yesterday  NE 4  Milrinone 0.75  PAD 36  CO 5.6  PA 80/30's  2 L NC  Insulin 2  2.5 L NC   mL  97.5  +3307 mL in the last 24  +2799 mL since admit      Review of Systems  Review of Systems   Unable to perform ROS: Acuity of condition        Vital Signs for last 24 hours   Temp:  [35.4 °C (95.7 °F)-36.4 °C (97.5 °F)] 35.4 °C (95.7 °F)  Pulse:  [61-90] 87  Resp:  [9-28] 23  BP: ()/(44-72) 85/54  SpO2:  [89 %-100 %] 91 %    Hemodynamic parameters for last 24 hours  CVP:  [14 MM HG-35 MM HG] 32 MM HG  PCWP:  [34 MM HG-47 MM HG] 40 MM HG  CO:  [3-6] 4.5  CI:  [1.7-3.5] 3.1    Respiratory Information for the last 24 hours  Vent Mode: APVCMV  Rate (breaths/min): 16  Vt Target (mL): 400  PEEP/CPAP: 5  P Support: 12  MAP: 12  Control VTE (exp VT): 409    Physical Exam   Physical Exam  Constitutional:       Appearance: She is not diaphoretic.   HENT:      Head: Normocephalic and atraumatic.      Right Ear: External ear normal.      Left Ear: External ear normal.      Nose: Nose normal.      Mouth/Throat:      Mouth: Mucous membranes are moist.      Pharynx: No oropharyngeal exudate.   Eyes:      General:         Right eye: No  discharge.         Left eye: No discharge.      Pupils: Pupils are equal, round, and reactive to light.   Neck:      Musculoskeletal: Normal range of motion and neck supple.   Cardiovascular:      Pulses: Normal pulses.      Heart sounds: No friction rub. No gallop.       Comments: Sinus rhythm  Pulmonary:      Breath sounds: Rales (Few crackles at both lung bases) present. No wheezing.   Abdominal:      General: Bowel sounds are normal. There is no distension.      Tenderness: There is no abdominal tenderness. There is no guarding.   Musculoskeletal: Normal range of motion.      Comments: No clubbing or cyanosis   Skin:     General: Skin is warm and dry.      Capillary Refill: Capillary refill takes less than 2 seconds.   Neurological:      Comments: Awake and alert.  Moves all 4 extremities.  Fully oriented.         Medications  Current Facility-Administered Medications   Medication Dose Route Frequency Provider Last Rate Last Admin   • glucose 4 g chewable tablet 16 g  16 g Oral Q15 MIN PRN Viola Brannon, A.P.N.        And   • dextrose 50% (D50W) injection 50 mL  50 mL Intravenous Q15 MIN PRN Viola Brannon, A.P.N.       • insulin lispro (HumaLOG) injection  0-15 Units Subcutaneous ACHS & 0200 Viola Brannon, A.P.N.   Stopped at 02/09/21 1100   • Respiratory Therapy Consult   Nebulization Continuous RT Herminia C Foreign, P.A.-C.       • NS infusion   Intravenous Continuous Herminia C Foreign, P.A.-C. 10 mL/hr at 02/08/21 1340 New Bag at 02/08/21 1340   • magnesium sulfate in D5W IVPB premix 1 g  1 g Intravenous DAILY Herminia C Foreign, P.A.-C.   Stopped at 02/09/21 0600   • Pharmacy Consult Request ...Pain Management Review 1 Each  1 Each Other PHARMACY TO DOSE Herminia C Foreign, P.A.-C.       • acetaminophen (TYLENOL) tablet 1,000 mg  1,000 mg Oral Q6HRS Herminia C Foreign, P.A.-C.   1,000 mg at 02/09/21 1705   • senna-docusate (PERICOLACE or SENOKOT S) 8.6-50 MG per tablet 2 Tab  2 Tab Oral BID Herminia C Foreign, P.A.-C.   2 Tab at 02/09/21  1705    And   • polyethylene glycol/lytes (MIRALAX) PACKET 1 Packet  1 Packet Oral DAILY Herminia C Foreign, P.A.-C.   Stopped at 02/09/21 0600    And   • [START ON 2/10/2021] magnesium hydroxide (MILK OF MAGNESIA) suspension 30 mL  30 mL Oral DAILY Herminia C Foreign, P.A.-C.        And   • bisacodyl (DULCOLAX) suppository 10 mg  10 mg Rectal QDAY PRN Herminia C Foreign, P.A.-C.       • omeprazole (PRILOSEC) capsule 20 mg  20 mg Oral DAILY Herminia C Foreign, P.A.-C.   Stopped at 02/09/21 0600   • electrolyte-A (PLASMALYTE-A) infusion   Intravenous PRN Herminia C Foreign, P.A.-C.   1,000 mL at 02/08/21 1400   • mupirocin (BACTROBAN) 2 % ointment 1 Application  1 Application Topical BID Herminia C Foreign, P.A.-C.   1 Application at 02/09/21 1705   • aspirin EC (ECOTRIN) tablet 81 mg  81 mg Oral DAILY Herminia C Foreign, P.A.-C.   Stopped at 02/09/21 0600   • MD Alert...Warfarin per Pharmacy   Other PHARMACY TO DOSE Herminia C Foreign, P.A.-C.       • oxyCODONE immediate-release (ROXICODONE) tablet 5 mg  5 mg Oral Q3HRS PRN Herminia C Foreign, P.A.-C.       • oxyCODONE immediate release (ROXICODONE) tablet 10 mg  10 mg Oral Q3HRS PRN Herminia C Foreign, P.A.-C.   10 mg at 02/09/21 1707   • fentaNYL (SUBLIMAZE) injection 50 mcg  50 mcg Intravenous Q3HRS PRN Herminia C Foreign, P.A.-C.       • traMADol (ULTRAM) 50 MG tablet 50 mg  50 mg Oral Q4HRS PRN Herminia C Foreign, P.A.-C.   50 mg at 02/09/21 1922   • ondansetron (ZOFRAN) syringe/vial injection 8 mg  8 mg Intravenous Q6HRS PRN Herminia C Foreign, P.A.-C.   8 mg at 02/09/21 1246    Or   • prochlorperazine (COMPAZINE) injection 10 mg  10 mg Intravenous Q6HRS PRN Herminia C Foreign, P.A.-C.        Or   • promethazine (PHENERGAN) suppository 25 mg  25 mg Rectal Q6HRS PRN Herminia C Foreign, P.A.-C.       • mag hydrox-al hydrox-simeth (MAALOX PLUS ES or MYLANTA DS) suspension 30 mL  30 mL Oral Q4HRS PRN Herminia C Foreign, P.A.-C.       • diphenhydrAMINE (BENADRYL) tablet/capsule 25 mg  25 mg Oral HS PRN - MR X 1 Herminia C Foreign, P.A.-C.       • EPINEPHrine  (Adrenalin) infusion 4 mg/250 mL (premix)  0-0.2 mcg/kg/min Intravenous Continuous Herminia C Foreign, P.A.-C.   Stopped at 02/09/21 0013   • norepinephrine (Levophed) infusion 8 mg/250 mL (premix)  0-30 mcg/min Intravenous Continuous Herminia C Foreign, P.A.-C. 9.4 mL/hr at 02/09/21 1700 5 mcg/min at 02/09/21 1700   • electrolyte-A (PLASMALYTE-A) (BOLUS) infusion 1,000 mL  1,000 mL Intravenous PRN Viola Brannon, A.P.N.       • albumin human 5% solution 12.5 g  12.5 g Intravenous PRN Viola Brannon, A.P.N.       • milrinone lactate (Primacor) 40 mg in 5% dextrose 250 mL Infusion  0-0.75 mcg/kg/min Intravenous Continuous Elda Galeano M.D. 11.6 mL/hr at 02/09/21 1800 0.45 mcg/kg/min at 02/09/21 1800   • butalbital/apap/caffeine -40 mg (Fioricet) per tablet 1 Tab  1 Tab Oral Q6HRS PRN Pino Case M.D.   1 Tab at 02/07/21 1218   • amiodarone (NEXTERONE) 360 mg/200 mL infusion  0.5-1 mg/min Intravenous Continuous Pino Case M.D. 17 mL/hr at 02/07/21 2338 0.5 mg/min at 02/07/21 2338       Fluids    Intake/Output Summary (Last 24 hours) at 2/9/2021 1926  Last data filed at 2/9/2021 1800  Gross per 24 hour   Intake 2529.99 ml   Output 2359 ml   Net 170.99 ml       Laboratory  Recent Labs     02/08/21  1957 02/08/21  2331 02/09/21  0218   ISTATAPH 7.329* 7.427 7.468   ISTATAPCO2 33.5 34.5 32.0   ISTATAPO2 106* 92* 103*   ISTATATCO2 19* 24 24   [id number] 98 97 98   ISTATARTHCO3 17.6 22.8 23.2   ISTATARTBE -8* -1 0   ISTATTEMP 36.4 C 36.4 C 36.0 C   ISTATFIO2 40 30 30   ISTATSPEC Arterial Arterial Arterial   ISTATAPHTC 7.338* 7.436 7.483   [id number] 102* 88* 97*         Recent Labs     02/07/21  1100 02/07/21  1100 02/07/21  2230 02/07/21  2230 02/08/21  1255 02/08/21  1805 02/08/21  2302 02/09/21  0330   SODIUM 121*  --  128*  --   --   --  132* 135   POTASSIUM 4.5   < > 4.3   < > 4.2 4.8 5.3  5.3 5.0   CHLORIDE 92*  --  96  --   --   --  98 101   CO2 19*  --  23  --   --   --  21 23   BUN 30*  --  28*  --    --   --  28* 28*   CREATININE 2.04*  --  2.02*  --   --   --  2.12* 2.28*   MAGNESIUM 2.2  --  2.3  --  3.0*  --   --   --    CALCIUM 8.1*  --  8.3*  --   --   --  8.0* 8.2*    < > = values in this interval not displayed.     Recent Labs     02/07/21 2230 02/08/21  2302 02/09/21  0330   GLUCOSE 105* 126* 136*     Recent Labs     02/07/21  0335 02/07/21 2230 02/08/21 0411 02/09/21  0330   WBC 5.0 4.8 4.8 16.6*   NEUTSPOLYS 52.40 58.90 55.80  --    LYMPHOCYTES 24.60 20.70* 20.20*  --    MONOCYTES 12.00 11.30 12.00  --    EOSINOPHILS 9.40* 7.70* 10.30*  --    BASOPHILS 1.20 0.80 1.30  --      Recent Labs     02/07/21 2230 02/08/21  0000 02/08/21 0411 02/08/21  1255 02/09/21  0330 02/09/21  1121   RBC 3.33*  --  3.14*  --  2.63*  --    HEMOGLOBIN 9.1*  --  8.6*  --  7.1* 8.4*   HEMATOCRIT 28.5*  --  26.9*  --  22.4* 25.8*   PLATELETCT 209  --  206 120* 74*  --    PROTHROMBTM  --  20.1* 19.5* 19.8* 17.9*  --    APTT  --  >240.0*  --  69.6*  --   --    INR  --  1.66* 1.60* 1.63* 1.43*  --        Imaging  X-Ray:  I have personally reviewed the images and compared with prior images. and My impression is: Slightly increased right lower lobe opacification    Assessment/Plan  * Rheumatic mitral stenosis- (present on admission)  Assessment & Plan  S/P MVR with 29 mm Medtronic Mosaic porcine valve and repair of the tricuspid valve with 38 mm Chavez flexible annuloplasty band on 2/8 by Dr. Webber  Left sided Maze procedure with MARK ligation on 2/8 by Dr. Webber  Begin Coumadin    Cardiogenic shock (HCC)  Assessment & Plan  Echo with RV overload and severely dilated right ventricle with reduced right ventricular systolic function due to rheumatic mitral stenosis  I am titrating norepinephrine and milrinone based upon hemodynamic parameters    Atrial fibrillation (HCC)  Assessment & Plan  Optimize potassium and magnesium  In sinus rhythm    Acute on chronic right-sided heart failure (HCC)  Assessment & Plan  Echo with RV  overload and severely dilated right ventricle with reduced right ventricular systolic function due to rheumatic mitral stenosis  I am titrating milrinone and norepinephrine based upon hemodynamic parameters    RUSH (acute kidney injury) (HCC)- (present on admission)  Assessment & Plan  Suspect cardiorenal  Monitor renal function and urine output  Avoid nephrotoxins and renal dose medications    Pulmonary hypertension (HCC)- (present on admission)  Assessment & Plan  Maintain euvolemia and begin diuresis when clinically appropriate       VTE:  Contraindicated  Ulcer: PPI  Lines: Central Line  Ongoing indication addressed, Arterial Line  Ongoing indication addressed and Echols Catheter  Ongoing indication addressed    I have performed a physical exam and reviewed and updated ROS and Plan today (2/9/2021). In review of yesterday's note (2/8/2021), there are no changes except as documented above.     I have assessed and reassessed her respiratory status, blood pressure, hemodynamics, cardiovascular status with titration of norepinephrine and milrinone and her neurologic status.  She is at increased risk for worsening respiratory and cardiovascular system dysfunction.    Discussed patient condition and risk of morbidity and/or mortality with RN, RT, Pharmacy, Charge nurse / hot rounds, QA team and CVS     The patient remains critically ill.  Critical care time = 35 minutes in directly providing and coordinating critical care and extensive data review.  No time overlap and excludes procedures.    Adal Molina MD  Pulmonary and Critical Care Medicine

## 2021-02-09 NOTE — CARE PLAN
Problem: Day of surgery post CABG/Heart valve replacement  Goal: Stabilization in immediate post op period  Outcome: PROGRESSING AS EXPECTED  Intervention: If radial artery used, elevate arm, no BP checks or needle sticks from affected arm, monitor ulnar pulse and capillary refill  Note: Right femoral arterial line in place. HOB no higher than 30 degrees, pulses monitored  Intervention: For CT drainage > 300 cc in first post op hour and/or 150 cc in subsequent hours: platelets, coag screen, fibrinogen, H&H per order  Note: CT output WDL, total output 160 mL. 410 mL out since surgery   Intervention: VAP protocol in place  Note: Vap protocol in place while mechanically ventilated, HOB 30 degrees, oral care performed, SAT and SBT assessed and performed   Intervention: Wean from vent per protocol (see protocol), extubation goal with 2-6 hours post op.  Note: Patient weaned per MD order this morning. Patient met all parameters and extubated at 0541.   Intervention: Bedrest until extubated and groin lines out  Note: Bedrest while femoral line in place   Intervention: Maintain all original surgical dressings for 24 hours  Note: Island dressing in place. To be removed 2/9 1240    Intervention: Discontinue [place] sofya and arterial line 12-18 hours post op if hemodynamically stable and off vasoactive drips  Note: Unable to d/c, pt remains on vasoactive gtts titrated to PAP   Intervention: A-Fib and DVT prophylaxis per MD order or contraindications documented (refer to DVT/VTE problem on Care Plan)  Note: SCDs in use, pharmacological prophylaxis in place, ROM encouraged and performed     Problem: Post Op Day 1 CABG/Heart Valve Replacement  Goal: Optimal care of the post op CABG/heart valve replacement Post Op Day 1  Intervention: EKG and CXR completed  Note: AM CXR and EKG obtained per protocol   Intervention: All valve patients: PT/INR daily  Note: INR obtained. See results flowsheets   Intervention: Antibiotics are  discontinued within 24 hours of anesthesia end time unless indication documented for continuation beyond 24 hours  Note: Vancomycin d/c

## 2021-02-09 NOTE — PROGRESS NOTES
12 hour chart check performed at bedside    Monitor Summary  SR, rare PVCs  HR 65-72  0.18/0.08/0.44    AV wires in place. V wires connected to backup pacing. A wires disconnected

## 2021-02-09 NOTE — PROCEDURES
"Procedures Bronchoscopy    Date of Service: 2/8/2021    Procedure:  Diagnostic and therapeutic bronchoscopy with BAL    Indication: Respiratory failure, concern for aspiration.    Physician:  Dr. Umang Hackett MD    Post Procedure Diagnosis:  1.  Severe tracheomalacia      Narrative:  Appropriate consent was obtained and \"time out\" was performed.  A flexible fiberoptic bronchoscope was then inserted through the ETT without difficulty.  The scope was advanced to the elizabeth and 4 ml of lidocaine 1% was instilled onto the elizabeth.  All airways were evaluated to the sub-segmental level.  The airway mucosa was pink and smooth.  No endobronchial lesions were seen.  There was tracheomalacia of the trachea and right lower, middle and upper bronchi.  The bronchoscope was then placed the right lower lobe, and right middle bronchus, 20 ml of saline was instilled with moderate return of manoj colored BAL fluid.  The BAL specimen will be sent for appropriate culture.  The left No immediate complications.  EBL = 0.      Escobar Small D.O.          "

## 2021-02-09 NOTE — CARE PLAN
Problem: Day of surgery post CABG/Heart valve replacement  Goal: Stabilization in immediate post op period  Outcome: PROGRESSING SLOWER THAN EXPECTED  Intervention: VS q 15 min x 4 hours, then q 1 hour. Include temperature immediately upon arrival. Check CO/CI q 2-4 hours and PRN  Note: Completed per protocol, VSS, see flowsheets  Intervention: If radial artery used, elevate arm, no BP checks or needle sticks from affected arm, monitor ulnar pulse and capillary refill  Note: N/A  Intervention: First post op hour labs and diagnostics per MD order  Note: Completed and sent per protocol  Intervention: Serum K q 6 hours x 24 hours.  ABG and CBC prn.  Note: Labs sent per protocol, meds ordered per protocol  Intervention: For FSBS greater than 130, start Post Cardiac Surgery Insulin Drip Protocol  Note: Drip started per protocol, currently at 1unit/hour  Intervention: FSBS frequency as per Cardiac Surgery Insulin Drip Protocol  Note: On insulin gtt, BG checked hourly  Intervention: Chest tube to 20 cm suction, record CT drainage with VS  Note: CT to suction, 250 total shift output  Intervention: Titrate and wean off vasoactive drips per patient's condition and per MD order while maintaining SBP  mmHg per MD order  Note: BP in goal range, titrating levophed and epi as needed  Intervention: VAP protocol in place  Note: Completed with RT  Intervention: Wean from vent per protocol (see protocol), extubation goal with 2-6 hours post op.  Note: Extubation goal not met, patient had not been waking up, patient reversed with sugamadex at 1855

## 2021-02-09 NOTE — PROGRESS NOTES
Updated Dr. Galeano on current hemodynamics, lung sounds, PAP, urine output, and RASS. OK to SBT to wean and extubate. If patient meets all parameters, OK to extubate. Patient on SBT at 0420. Plan for 1 hour.

## 2021-02-09 NOTE — PROGRESS NOTES
Pulmonary/Critical Care Medicine   Progress Note    Date of service: 2/8/2021  Time: 2300    Called to the bedside to evaluate patient:  40 year old lady with the past medical history of severe TR and severe MR resulting in severe RV dilation with reduced function as well as RVSP at 105 mmHg seen on ECHO from 12/15/2020.  She was admitted on 2/3 for cardiogenic shock, RUSH, and severe pulmonary HTN from her valve disease.  The patient underwent mitral valve replacement with tricuspid valve repair, left-sided MAZE procedure, and left atrial appendage excision/ligation.  She remains on the ventilator at this time with CMV/400/16/5/30%.  Pt's PAP 60-61, PCWP 47, CO 4.4, CI 2.5.  Pt on norepi at 1-2 mcg and epi at 0.6.    Based on the patient's severe pulmonary HTN and RV dysfunction whose RVSP was 105 mmHg in 12/2020, will start milrinone at low dose in an attempt to low PAP from 60 to 40-50.      Check BMP since they have not been drawn in over 24 hours.    Lasix 20mg IVP given after milrinone running for an hour.    Pt with increased UOP, improved CVP (30 to 14), CI to 2.4    I spent 85 min in reviewing the patient's condition, physical examination, laboratory and imaging data, prior documentation, in discussion with bedside RN and pharmacy in formulating an assessment/plan.    Critical Care time: 85 min. No time overlap, procedures not included in time.  [id number] x 2    **Addendum:  Pt with excellent UOP since Lasix administration and Milrinone drip.  Improved CVP, PAP, SVR, and CI.  Pt started SAT and placed on SBT.  Excellent parameters and extubated.

## 2021-02-09 NOTE — THERAPY
Missed Therapy     Patient Name: Carolyn Hauser  Age:  40 y.o., Sex:  female  Medical Record #: [id number]  Today's Date: 2/9/2021 02/09/21 0803   Initial Contact Note    Initial Contact Note Order Received and Verified, Occupational Therapy Evaluation in Progress with Full Report to Follow.   Session Information  OT orders received, patient is POD#1 will initiate services POD#2 as appropriate    Date / Session Number  2/9 POD#1

## 2021-02-09 NOTE — PROGRESS NOTES
MS    SR 70s-90s  .16/.08/.44  Rare PVCs and PACs    A and V wires in place, capture tested but patient did not need to be paced, Mode VVI, V wires plugged in, output at 10ma and sensitivity at 2    12 hour chart check

## 2021-02-09 NOTE — ASSESSMENT & PLAN NOTE
Echo with RV overload and severely dilated right ventricle with reduced right ventricular systolic function due to rheumatic mitral stenosis  Begin diuresis with furosemide drip  May need CRRT with UF  I am titrating norepinephrine to keep mean arterial pressure greater than 65

## 2021-02-09 NOTE — ASSESSMENT & PLAN NOTE
Intubated 2/8 for cardiac surgery  All of the appropriate ventilator bundles are in place  Continue vent support  Wean ventilator based upon cardiopulmonary status

## 2021-02-09 NOTE — THERAPY
Missed Therapy     Patient Name: Carolyn Hauser  Age:  40 y.o., Sex:  female  Medical Record #: [id number]  Today's Date: 2/9/2021 02/09/21 0726   Interdisciplinary Plan of Care Collaboration   Collaboration Comments PT eval order received. Patient is POD 1 open heart surgery. Will eval as per department algorithm POD 2 and when medically appropriate

## 2021-02-10 PROBLEM — J96.01 ACUTE HYPOXEMIC RESPIRATORY FAILURE (HCC): Status: ACTIVE | Noted: 2021-01-01

## 2021-02-10 PROBLEM — R04.2 HEMOPTYSIS: Status: ACTIVE | Noted: 2021-01-01

## 2021-02-10 PROBLEM — J81.0 ACUTE PULMONARY EDEMA (HCC): Status: ACTIVE | Noted: 2021-01-01

## 2021-02-10 NOTE — THERAPY
Contact Note    Patient Name: Carolyn Hauser  Age:  40 y.o., Sex:  female  Medical Record #: [id number]  Today's Date: 2/10/2021    PT irene held, pt is POD#2 however has had a signficant change in medical status and was emergently intubated will monitor status and initiate as appropriate

## 2021-02-10 NOTE — PROGRESS NOTES
0140- CT output now up to 1150. 400 mL total for this half of shift. Patient with tidaling in chamber and tubing. Labs sent. PLT critical at 33. 8.7/26.8 H&H. Susi Luis paged regarding these new findings. Orders for 2 units PLT. 1 unit plt given on downtime paper form. Second unit via Epic

## 2021-02-10 NOTE — PROCEDURES
Date of service:  2/10/2021    Title:  Arterial catheter placement - axillary artery    Indication: Cardiogenic shock    Narrative:    A time out was performed identifying the correct patient, correct procedure and correct location for this procedure prior to performing this procedure.    The left arm was prepped with chlorhexidine and draped in the usual sterile fashion.  A 20 gauge arterial catheter was placed into the left axillary artery under ultrasound guidance using the technique described by Ani without difficulty or apparent complication.  The line was sutured into place and a sterile dressing was placed over the line.  An outstanding arterial waveform is present on the monitor.  The patient tolerated the procedure quite nicely.  No complications are apparent.      Adal Molina MD  Pulmonary and Critical Care Medicine

## 2021-02-10 NOTE — CONSULTS
[facility] -  CONSULTATION NOTE               Author: Scotty Delacruz M.D. Date & Time: 2/10/2021  11:29 AM       REASON FOR CONSULTATION:   - RUSH and volume management    CHIEF COMPLAINT:   -  Unable to obtain due to acuity    HISTORY OF PRESENT ILLNESS:    40F, PMH as below; presented for AMS and hypoglycemia and admitted for acute heart failure secondary to severe mitral stenosis and pulmonary hypertension with cardiorenal syndrome. Patient initially improved with diuresis. Cr was 0.98 in 12/2020, upon admit was 2.17. She also presented with atrial fibrillation was started on digoxin, converted and then was placed on amiodarone until maze procedure. Echo from 2/3 showed preserved EF 55%, abnormal septal motion consistent with RV overload and elevated RV end diastolic pressure, severely dilated RV and reduced systolic fx, severe rheumatic mitral stenosis. Cardiothoracic surgery performed MV replacement, tricuspid repair, maze procedure, left atrial appendage excision/liation, and intra-op FLORENTINO on 2/8. Patient did require transfusion of platelets, FFP, and Cryo prior to surgery for coagulopathies. POD1 she required 2 units of PRBC and was extubated.  POD2 she developed acute respiratory failure requiring emergent intubation. TTE showing flattened septum, reduced RV systolic function, RSVP 90, EF of 50%, prosthetic MV. Patient oliguric, was started on a lasix drip with improvement in UOP. We were then consulted for further volume management and RUSH.     REVIEW OF SYSTEMS:    -unable to obtain due to acutity    PAST MEDICAL HISTORY:   - severe mitral stenosis and tricuspid regurgitation s/p valve replacement this admission 2/8  - moderate to severe pulmonary hypertension  - congestive heart failure  - atrial fibrillation (presented on admission)    PAST SURGICAL HISTORY:   - 2/8/2020 Mitral valve replacement (29 mm Medtronic Mosaic porcine valve), tricuspid valve repair (38 mm Chavez flexible  "annuloplasty band), left-sided maze procedure (Medtronic cryoprobe), left atrial appendage excision/ligation and intraoperative transesophageal echocardiography    FAMILY HISTORY:   - unable to obtain due to acuity  - per H&P \"family history includes Heart Disease in her father, maternal uncle, and paternal uncle; Hypertension in her maternal uncle and paternal uncle; No Known Problems in her mother\"    SOCIAL HISTORY:   - unable to obtain due to acuity  - per H&P \"reports that she has never smoked. She quit smokeless tobacco use about 4 weeks ago.  Her smokeless tobacco use included chew. She reports previous alcohol use. She reports previous drug use\"    HOME MEDICATIONS:   - Reviewed and documented in chart    LABORATORY STUDIES:   - Reviewed and documented in chart    ALLERGIES:  Patient has no known allergies.    VS:  BP (!) 90/60   Pulse 95   Temp 36.6 °C (97.9 °F) (Temporal)   Resp (!) 34   Ht 1.626 m (5' 4\")   Wt 70 kg (154 lb 5.2 oz)   LMP 01/25/2021 (Approximate)   SpO2 94%   BMI 26.49 kg/m²   Physical Exam   Constitutional:   Intubated and sedated   Eyes: Pupils are equal, round, and reactive to light. Conjunctivae are normal.   Sluggish pupillary reflex but recent paralytics for intubation and on sedation   Cardiovascular: Normal rate, regular rhythm and normal heart sounds. Exam reveals no gallop and no friction rub.   No murmur heard.  Pulmonary/Chest:   Intubated, diffuse ronchi and coarse rales   Abdominal: Soft. There is no abdominal tenderness.   Musculoskeletal:         General: Edema present.      Comments: 2+ pitting edema in all extremities   Neurological:   Intubated, sluggish pupillary reaction, recent paralytics and on sedation   Skin: Skin is warm and dry.       FLUID BALANCE:  In: 3113.3 [P.O.:1565; I.V.:675.4; Blood:872.9]  Out: 2224     LABS:  Recent Labs     02/09/21  0330 02/10/21  0047 02/10/21  0847   SODIUM 135 128* 125*   POTASSIUM 5.0 4.2 4.3   CHLORIDE 101 94* 91*   CO2 " 23 21 18*   GLUCOSE 136* 86 81   BUN 28* 32* 33*   CREATININE 2.28* 2.07* 2.11*   CALCIUM 8.2* 7.9* 7.7*        IMAGING:  - Imaging studies reviewed by me    IMPRESSION:  # RUSH   - likely from hemodynamic changes, CRS type 1   - UA showing some protein but not markedly elevated   - lasix drip started 2/10 following episode of acute respiratory failure requiring intubation (cautious with preload dependence)   - baseline around Cr 0.98 on 12/15/2020, currently oliguric  # Cardiogenic Shock   - s/p MV replacement and tricuspid valve repair   - likely from right sided heart failure and pulmonary hypertension  # Acute Respiratory Failure   - currently intubated, likely from volume overload  # Pulmonary Hypertension   - patient receiving flolan  # Anemia  # Atrial Fibrillation   - s/p maze procedure  # Hyperphosphatemia  # Mixed Acid Base Disorder   -primary metabolic acidosis, secondary respiratory alkalosis and metabolic alkalosis    PLAN:  - Continue lasix drip at 5mg/hr, may titrate up to 20mg/hr to maintain UOP >50ml/hr, consider titrating down if requiring maximal pressor support  - Transfuse to keep Hg >7  - Continue to monitor phosphate without current feeding  - Minimize and concentrate IVF  - Dose all medications renally  - Accurate I/Os  - Daily labs        Thank you for the consultation!

## 2021-02-10 NOTE — ASSESSMENT & PLAN NOTE
Due to cardiogenic pulmonary edema  Bronchoscopy on 2/10 did not reveal an endobronchial bleeding site and the blood easily cleared with lavage and suctioning

## 2021-02-10 NOTE — PROGRESS NOTES
Cardiac Surgery RN Navigator Daily Rounding Note  Procedure Performed: Procedure(s):  REPLACEMENT, MITRAL VALVE  REPAIR, TRICUSPID VALVE  ECHOCARDIOGRAM, TRANSESOPHAGEAL     By Dr. Webber  2 Days Post-Op    Pertinent Overnight Events:    Increased CT output of 750 cc overnight with plts at 33; 2 platelets given; now plts improved to 81.    Creatinine unchanged at 2.07; marginal UOP of 375    Pulmonary edema-20 IV lasix given by intensivist followed by 60 mg lasix given by LULY White    Also increasing levo needs (15 mcg); stat ECHO. No pericardial effusion, valve in place; shows RV volume overload    Patient intubated and bronched with new ART line    Pertinent neuro findings/needs: sedated for intubation    Cardiac/hemodynamics:  Temp (24hrs), Av.9 °C (96.6 °F), Min:35.4 °C (95.7 °F), Max:36.6 °C (97.9 °F)    Heart rhythm: SR  Temp:  [35.4 °C (95.7 °F)-36.6 °C (97.9 °F)] 36.6 °C (97.9 °F)  Pulse:  [67-99] 99  Resp:  [9-28] 28  BP: ()/(36-72) 86/45  SpO2:  [89 %-100 %] 93 %  CVP:  [28 MM HG-35 MM HG] 33 MM HG  PCWP:  [16 MM HG-40 MM HG] 16 MM HG  CO:  [3.9-6] 3.9  CI:  [2.2-3.5] 2.2   IV gtts: dexmedetomidine (PRECEDEX) infusion, Last Rate: 1.5 mcg/kg/hr (02/10/21 0644)  NS, Last Rate: Stopped (21)  EPINEPHrine (Adrenalin) infusion, Last Rate: Stopped (21)  norepinephrine (Levophed) infusion, Last Rate: 15 mcg/min (02/10/21 0652)  Milrinone infusion, Last Rate: Stopped (21)        12-hour chest tube output: 750 cc    /Weights:  Admit weight: Weight: 63.5 kg (140 lb)  Current Weight: Weight: 70 kg (154 lb 5.2 oz) (02/10/21 0330)  Weight change: 0.6 kg (1 lb 5.2 oz)    Intake/Output Summary (Last 24 hours) at 2/10/2021 0742  Last data filed at 2/10/2021 0600  Gross per 24 hour   Intake 2573.28 ml   Output 2224 ml   Net 349.28 ml       Adequate urine output: marginal at 375 cc    Respiratory:  Vent Mode: APVCMV  Rate (breaths/min): 28  Vt Target (mL): 320  PEEP/CPAP:  12  MAP: 13  Control VTE (exp VT): 321     Pertinent respiratory findings/needs: pulmonary edema; crackles; bloody sputum    GI findings/needs: prior to surgery    Labs:  Recent Labs     02/08/21  0411 02/09/21  0330 02/09/21  1121 02/10/21  0047 02/10/21  0435   WBC 4.8 16.6*  --  7.4 5.3   RBC 3.14* 2.63*  --  3.16* 3.24*   HEMOGLOBIN 8.6* 7.1* 8.4* 8.7* 8.8*   HEMATOCRIT 26.9* 22.4* 25.8* 26.8* 27.3*   MCV 85.7 85.2  --  84.8 84.3   MCH 27.4 27.0  --  27.5 27.2   MCHC 32.0* 31.7*  --  32.5* 32.2*   RDW 58.8* 57.9*  --  55.5* 56.2*   PLATELETCT 206 74*  --  33* 81*   MPV 11.0 11.0  --   --  9.1     Recent Labs     02/08/21  2302 02/09/21  0330 02/10/21  0047   SODIUM 132* 135 128*   POTASSIUM 5.3  5.3 5.0 4.2   CHLORIDE 98 101 94*   CO2 21 23 21   GLUCOSE 126* 136* 86   BUN 28* 28* 32*   CREATININE 2.12* 2.28* 2.07*   CALCIUM 8.0* 8.2* 7.9*     INR:   Recent Labs     02/08/21  0411 02/08/21  1255 02/09/21  0330 02/10/21  0047   INR 1.60* 1.63* 1.43* 1.77*       Required Cardiac medications review:  ASA:  Yes  Plavix: No; contraindicated because of On Coumadin / NOAC  BB: No; contraindicated because of High risk for heart block  ACE/ARB: No; contraindicated because of Increased creatinine/CKD  Statin: no; no known CAD  Diuretic: yes-intermittent doses    LDA's necessity reviewed: YES    Thoughts and considerations for team rounding:    Placed order for CMP to check potassium given lasix administration-although patient had minimal response to lasix    Discharge plan:    TBD  Post op appointments in place

## 2021-02-10 NOTE — CARE PLAN
Problem: Post Op Day 1 CABG/Heart Valve Replacement  Goal: Optimal care of the post op CABG/heart valve replacement Post Op Day 1  Outcome: PROGRESSING SLOWER THAN EXPECTED  Intervention: Discontinue heck catheter unless documented reason for continuation  Note: Heck remains in place   Intervention: Consider chest tube removal by MD  Note: CT remain in place, high output this shift   Intervention: IS q 1 hour while awake and record best IS volume  Note: Weak effort to IS, 800 ML best   Intervention: Transfer to [facility] status, begin VS q 4 hours  Note: Patient remains ICU status d/t vasopressor needs

## 2021-02-10 NOTE — DIETARY
"Nutrition Support Assessment   Day 7 of admit.  Carolyn Hauser is a 40 y.o. female with admitting DX of Hypotension     Current problem list:  1. S/p AVR/MVR .  2. Per CTS today: POD 2 Critically ill- weaned off milrinone yesterday- was doing well, last night had to titrate up levo, Had hemoptysis and was re-intubated, Cr stable- but minimal UO in response to 80 mg of lasix, Echo - shows right sided heart failure with RVSP of 90 mmHg- will restart epi for inotropic support, Will consult nephrology for possible CRRT.    Assessment:  Estimated Nutritional Needs based on:   Height: 162.6 cm (5' 4\")  Weight: 70 kg (154 lb 5.2 oz)  Ideal Body Weight: 54.4 kg (120 lb)  Percent Ideal Body Weight: 128.6  Body mass index is 26.49 kg/m²., BMI classification: Overweight  Admit weight was 67.1 kg (2/3), currently +3.3 L; BMI 25.39 (Normal/overweight).    Calculation/Equation: REE per MSJ x1.2 = 1628 kcal/day; RMR per PSU (vent L/min 9, T max/24 hours 36.6) = 1480 kcal/day  Total Calories / day: 1450 - 1650 (Calories / k - 24)  Total Grams Protein / day: 84 (Grams Protein / k.2)     Evaluation:   1. Pt is intubated and unable to take PO diet.  2. Feeding tube in place: The tip projects over the proximal duodenum.  3. TF to start.  4. Labs: Na 125, BUN 33, Cr 2.11, alb 2.7, phos 5.2, POC glu/24 hours ; HA1c 6.1% ().  5. Meds include epinephrine @ 0.08 mcg/kg/min, lasix, K+ scale, and levophed @ 23 mcg/min.  6. No wounds to follow.  7. Last BM recorded .  8. Trickle TF appropriate in the setting of hemodynamic instability requiring vasopressors.  9. Will utiliize low-volume, fiber-free TF formula.     Malnutrition Risk: None identified @ this time.      Recommendations/Plan:  1. Trickle Impact Peptide 1.5 @ 10 ml/hr. Would not advise advancing TF until vasopressor requirements lessen. Suggested goal rate will be 40 ml/hr when appropriate. TF @ goal will provide 1440 kcal, 90 grams protein, and 739 ml free " water per day.  2. Fluids per MD.    RD following.

## 2021-02-10 NOTE — PROGRESS NOTES
Updated Susi Luis APRN regarding patient's CT output, bloody sputum, dyspnea, and increasing levo needs. Read back H/H and CXR results.  Orders for 60 mg lasix and STAT ECHO ordered

## 2021-02-10 NOTE — PROGRESS NOTES
Patient with audible crackles. Coughing up bloody secretions. Orders from Dr. Galeano for 20 mg lasix, robitussin, and mucinex.

## 2021-02-10 NOTE — PROGRESS NOTES
Dr. Conn at bedside to intubate, bronch, and place arterial line.     timeout for intubation @ 0647  Wilmar and Etom given.  7.5 ET placed @ 21 at the teeth, + color change.     Timeout for bronchoscopy @ 0655. Procedure end at 0700. Sputum samples obtained.

## 2021-02-10 NOTE — PROGRESS NOTES
Called Dr. Molina to bedside to evaluate patient. Plan to intubate and bronch patient followed by reinsertion of arterial line. Patient updated with plan of care and agreeable.

## 2021-02-10 NOTE — PROCEDURES
Date of service:  2/10/2021    Title:  Emergent endotracheal intubation    Indication:  Respiratory failure    Narrative:    A time out was performed identifying the correct patient, correct procedure and correct location prior to this procedure.    The patient was sedated and paralyzed with 20 mg of IV etomidate and 80 mg of IV rocuronium.  A 7.5 Canadian endotracheal tube was placed directly into the trachea using a # 3 Glidescope without difficulty or apparent complication.  The CO2 detector made the appropriate color change, bilateral symmetrical breath sounds are present and fogging is present in the endotracheal tube.  All of this confirms that the endotracheal tube is indeed in the patient's trachea.  The patient tolerated the procedure quite nicely.  No complications are apparent.      Adal Molina MD  Pulmonary and Critical Care Medicine

## 2021-02-10 NOTE — THERAPY
Missed Therapy     Patient Name: Carolyn Hauser  Age:  40 y.o., Sex:  female  Medical Record #: [id number]  Today's Date: 2/10/2021         02/10/21 0825   Initial Contact Note    Initial Contact Note Order Received and Verified, Occupational Therapy Evaluation in Progress with Full Report to Follow.   Interdisciplinary Plan of Care Collaboration   Collaboration Comments OT irene held, pt is POD#2 however has had a signficant change in medical status and was emergently intubated will monitor status and initiate as appropriate    Session Information   Date / Session Number  2/10 HOLD    Priority 1

## 2021-02-10 NOTE — PROGRESS NOTES
Cardiovascular Surgery Progress Note    Name: Carolyn Hauser  MRN: [id number]  : 1980  Admit Date: 2/3/2021 12:55 AM  Procedure:  Procedure(s) and Anesthesia Type:     * REPLACEMENT, MITRAL VALVE - General     * REPAIR, TRICUSPID VALVE - General     * ECHOCARDIOGRAM, TRANSESOPHAGEAL - General  2 Day Post-Op    Vitals:  Patient Vitals for the past 8 hrs:   Temp SpO2 O2 Delivery Device O2 (LPM) Pulse Resp BP Weight FiO2%   02/10/21 0925 -- 94 % -- -- 95 (!) 0 -- -- --   02/10/21 0830 -- -- -- -- -- (!) 32 -- -- --   02/10/21 0825 -- -- -- -- 94 (!) 32 -- -- --   02/10/21 0820 -- -- -- -- 94 (!) 32 (!) 90/60 -- --   02/10/21 0815 -- -- -- -- 97 (!) 32 -- -- --   02/10/21 0810 -- -- -- -- 94 (!) 32 -- -- --   02/10/21 0805 -- -- -- -- 93 (!) 0 (!) 85/54 -- --   02/10/21 0800 -- -- -- -- 92 (!) 32 -- -- --   02/10/21 0705 -- 93 % -- -- -- (!) 28 -- -- 100 %   02/10/21 0630 -- 91 % -- -- 99 (!) 25 (!) 86/45 -- --   02/10/21 0615 -- 93 % -- -- 95 (!) 23 (!) 85/60 -- --   02/10/21 0600 -- 91 % -- -- 92 (!) 22 (!) 78/43 -- --   02/10/21 0545 -- 91 % Silicone Nasal Cannula 6 95 (!) 26 (!) 88/44 -- --   02/10/21 0530 -- 89 % -- -- 88 (!) 28 (!) 70/58 -- --   02/10/21 0515 -- -- -- -- 93 (!) 27 (!) 70/58 -- --   02/10/21 0500 -- -- -- -- 92 (!) 21 -- -- --   02/10/21 0452 -- -- -- -- 86 (!) 21 (!) 82/42 -- --   02/10/21 0445 -- 94 % -- -- 78 (!) 22 (!) 99/36 -- --   02/10/21 0430 -- 92 % -- -- 67 19 (!) 93/60 -- --   02/10/21 0415 -- 93 % -- -- 93 (!) 21 -- -- --   02/10/21 0400 36.6 °C (97.9 °F) 95 % Silicone Nasal Cannula 3 91 (!) 27 (!) 96/60 -- --   02/10/21 0345 -- 95 % -- -- 91 (!) 22 -- -- --   02/10/21 0330 -- -- -- -- 92 (!) 25 101/62 70 kg (154 lb 5.2 oz) --   02/10/21 0315 -- -- -- -- 84 (!) 25 (!) 98/55 -- --   02/10/21 0300 -- -- -- -- 89 20 (!) 92/48 -- --   02/10/21 0245 -- -- -- -- 88 (!) 25 (!) 96/53 -- --   02/10/21 0230 36.3 °C (97.3 °F) 97 % -- -- 87 18 (!) 96/53 -- --   02/10/21 0215 -- -- -- -- 70 18  (!) 96/47 -- --   02/10/21 0200 -- -- -- -- 87 17 (!) 88/55 -- --   02/10/21 0145 -- -- -- -- 89 17 (!) 76/55 -- --     Temp (24hrs), Av.9 °C (96.6 °F), Min:35.4 °C (95.7 °F), Max:36.6 °C (97.9 °F)      Respiratory:  Vent Mode: APVCMV, Rate (breaths/min): 28, PEEP/CPAP: 12, PIP: 43, MAP: 13 Respiration: (!) 0, Pulse Oximetry: 94 %     Chest Tube Drains:          Fluids:    Intake/Output Summary (Last 24 hours) at 2/10/2021 0935  Last data filed at 2/10/2021 0800  Gross per 24 hour   Intake 2480.07 ml   Output 1954 ml   Net 526.07 ml     Admit weight: Weight: 63.5 kg (140 lb)  Current weight: Weight: 70 kg (154 lb 5.2 oz) (02/10/21 0330)    Labs:  Recent Labs     21  0411 21  0330 21  1121 02/10/21  0047 02/10/21  0435   WBC 4.8 16.6*  --  7.4 5.3   RBC 3.14* 2.63*  --  3.16* 3.24*   HEMOGLOBIN 8.6* 7.1* 8.4* 8.7* 8.8*   HEMATOCRIT 26.9* 22.4* 25.8* 26.8* 27.3*   MCV 85.7 85.2  --  84.8 84.3   MCH 27.4 27.0  --  27.5 27.2   MCHC 32.0* 31.7*  --  32.5* 32.2*   RDW 58.8* 57.9*  --  55.5* 56.2*   PLATELETCT 206 74*  --  33* 81*   MPV 11.0 11.0  --   --  9.1     Recent Labs     21  2230 21   NEUTSPOLYS 58.90 55.80   LYMPHOCYTES 20.70* 20.20*   MONOCYTES 11.30 12.00   EOSINOPHILS 7.70* 10.30*   BASOPHILS 0.80 1.30     Recent Labs     21  2302 21  0330 02/10/21  0047   SODIUM 132* 135 128*   POTASSIUM 5.3  5.3 5.0 4.2   CHLORIDE 98 101 94*   CO2    GLUCOSE 126* 136* 86   BUN 28* 28* 32*   CREATININE 2.12* 2.28* 2.07*   CALCIUM 8.0* 8.2* 7.9*     Recent Labs     21  0000 21  1255 21  0330 02/10/21  0047   APTT >240.0* 69.6*  --   --    INR 1.66* 1.63* 1.43* 1.77*       Medications:  • Pharmacy  1 Each     • [START ON 2021] omeprazole  40 mg     • K+ Scale: Goal of 4.5  1 Each     • Pharmacy Consult Request  1 Each     • acetaminophen  1,000 mg     • mupirocin  1 Application     • aspirin EC  81 mg     • MD Alert...Warfarin per Pharmacy             Ordered Medications:    ASA - Yes    Plavix - No; contraindicated because of On Coumadin / NOAC    Post-operative Beta Blockers - No; contraindicated because of Hypotension    Ace Inhibitor - No; contraindicated because of Hypotension    Statin - No; contraindicated because of No CAD        Exam:   Review of Systems   Unable to perform ROS: Intubated       Physical Exam  Vitals reviewed.   Constitutional:       General: She is in acute distress.      Appearance: She is ill-appearing.      Interventions: She is intubated.   HENT:      Head: Normocephalic.      Mouth/Throat:      Mouth: Mucous membranes are moist.   Eyes:      Pupils: Pupils are equal, round, and reactive to light.   Cardiovascular:      Rate and Rhythm: Regular rhythm. Tachycardia present.      Pulses: Normal pulses.   Pulmonary:      Effort: She is intubated.   Abdominal:      Palpations: Abdomen is soft.   Musculoskeletal:         General: Normal range of motion.      Cervical back: Neck supple.   Skin:     General: Skin is warm.   Neurological:      Comments: sedated         Quality Measures:   Quality-Core Measures   Reviewed items::  EKG reviewed, Labs reviewed, Medications reviewed and Radiology images reviewed      Assessment/Plan:  POD 1 Critically ill- on milrinone/levo- wean milrinone, Extubated, Acute blood loss anemia- s/p transfusion- re-check CBC, CR elevated- watch, keep heck for strict I&O  POD 2 Critically ill- weaned off milrinone yesterday- was doing well, last night had to titrate up levo, Had hemoptysis and was re-intubated, Cr stable- but minimal UO in response to 80 mg of lasix, Echo - shows right sided heart failure with RVSP of 90 mmHg- will restart epi for inotropic support, Will consult nephrology for possible CRRT.     Active Hospital Problems    Diagnosis    • Acute hypoxemic respiratory failure (HCC) [J96.01]      Priority: High   • Acute on chronic right-sided heart failure (HCC) [I50.813]      Priority: High    • Cardiogenic shock (HCC) [R57.0]      Priority: High   • Pulmonary hypertension (HCC) [I27.20]      Priority: High   • Rheumatic mitral stenosis [I05.0]      Priority: High   • Hemoptysis [R04.2]      Priority: Medium   • Acute pulmonary edema (HCC) [J81.0]      Priority: Medium   • Atrial fibrillation (HCC) [I48.91]      Priority: Medium   • RUSH (acute kidney injury) (HCC) [N17.9]      Priority: Medium   • Acute encephalopathy [G93.40]      Priority: Medium   • Rheumatic heart failure (congestive) (HCC) [I09.81]      Priority: Medium   • Hyponatremia [E87.1]      Priority: Low   • Bilirubinemia [E80.6]      Priority: Low   • Coagulopathy (HCC) [D68.9]      Priority: Low

## 2021-02-10 NOTE — PROCEDURES
Date of Procedure:  2/10/2021    Title of Procedure:  Diagnostic and therapeutic flexible fiberoptic bronchoscopy with bronchoalveolar lavage    Indication for Procedure:   Hemoptysis    Post-procedure Diagnoses:    1.  Normal endobronchial anatomy  2.  No endobronchial tumor identified  3.  Juicy bloody secretions seen in the distal trachea, left mainstem bronchus, right mainstem bronchus, left lower lobe bronchi, right middle lobe bronchi and right lower lobe bronchi  4.  No endobronchial bleeding site identified  5.  Blood cleared easily from all airways with lavage and suctioning      Narrative:    A time out was performed identifying the correct patient, correct procedure and correct location prior to this procedure.    The patient was sedated, intubated and ventilated at the time of this procedure.  The flexible fiberoptic bronchoscope was inserted through the lumen of the endotracheal tube and advanced into the distal trachea without difficulty.  The airways were examined to the subsegmental bronchus level bilaterally.    The endobronchial anatomy was normal.  No tumor was identified.    There was a moderate amount of juicy, bloody secretions seen in the distal trachea, left mainstem bronchus, right mainstem bronchus, left lower lobe bronchi, right middle lobe bronchi and right lower lobe bronchi.  The right middle lobe bronchi and right lower lobe bronchi were occluded with these secretions.  I therapeutically suctioned these secretions.  No endobronchial bleeding site was identified.  The blood cleared easily from all of the airways with lavage with sterile saline and suctioning.    Bronchoalveolar lavage was carried out in the basilar segments of the right lower lobe bronchi using the standard technique with good fluid return.    Bronchoalveolar lavage fluid from the right lower lobe is submitted to the laboratory for cytology, gram stain, culture and sensitivity, acid fast bacilli smear and culture and  fungal culture.    The patient tolerated the procedure quite nicely.  No complications were apparent.  The heart rate and rhythm, blood pressure and oxygenation saturation were continuously monitored.      Adal Molina MD  Pulmonary and Critical Care Medicine

## 2021-02-10 NOTE — PROGRESS NOTES
Critical Care Progress Note    Date of admission  2/3/2021    Chief Complaint  40 y.o. female admitted 2/3/2021 with altered mental status and hypoglycemia.  She has a history of mitral stenosis.    Hospital Course      2/3 admitted to ICU  2/4 improved diuresis, symptomatic improvement. CTS plans to perform surgery Monday 2/5 Hemodynamics, symptoms, and labs dramatically improved with diuresis   2/6 hold off further diuretics and allow to auto-diurese  2/7 net + 500 yesterday, 1 time lasix 20 mg to keep even-slightly negative. OR tomorrow with CTS  2/8 -    MVR with tricuspid valve repair today.  2/9 -    titrating milrinone and norepinephrine based upon hemodynamic parameters.  She is liberated from the ventilator.  2/10 -    emergently intubated for respiratory distress and cardiogenic pulmonary edema.  Severe pulmonary hypertension with RV overload and RV systolic failure.  Full vent support.  Begin Lasix drip.  Start Flolan.      Interval Problem Update  Reviewed last 24 hour events:      0620 hours:    Developed hemoptysis overnight  Increased respiratory distress  Emergently intubated and bronchoscopy performed - see separate procedure notes  SR  97.9  +889 mL in the last 24  +3688 mL since admit  Not much response to 80 mg of Lasix this am  Start Lasix gtt  Start Flolan      Review of Systems  Review of Systems   Unable to perform ROS: Acuity of condition        Vital Signs for last 24 hours   Temp:  [35.4 °C (95.7 °F)-36.6 °C (97.9 °F)] 36.6 °C (97.9 °F)  Pulse:  [67-99] 94  Resp:  [0-32] 32  BP: ()/(36-72) 90/60  SpO2:  [89 %-100 %] 93 %    Hemodynamic parameters for last 24 hours  CVP:  [28 MM HG-35 MM HG] 33 MM HG  PCWP:  [16 MM HG-40 MM HG] 16 MM HG  CO:  [3.9-6] 3.9  CI:  [2.2-3.5] 2.2    Respiratory Information for the last 24 hours  Vent Mode: APVCMV  Rate (breaths/min): 28  Vt Target (mL): 320  PEEP/CPAP: 12  MAP: 13  Control VTE (exp VT): 321    Physical Exam   Physical Exam  Constitutional:        General: She is in acute distress.   HENT:      Head: Normocephalic and atraumatic.      Right Ear: External ear normal.      Left Ear: External ear normal.      Nose: Nose normal.      Mouth/Throat:      Mouth: Mucous membranes are moist.      Pharynx: Oropharynx is clear.   Eyes:      General:         Right eye: No discharge.         Left eye: No discharge.      Pupils: Pupils are equal, round, and reactive to light.   Cardiovascular:      Pulses: Normal pulses.      Heart sounds: Murmur present. No gallop.       Comments: This tachycardia  Pulmonary:      Breath sounds: Rales (Crackles bilaterally) present. No wheezing.   Abdominal:      General: Bowel sounds are normal. There is no distension.      Tenderness: There is no abdominal tenderness. There is no guarding.   Musculoskeletal:         General: Normal range of motion.      Cervical back: Normal range of motion. No rigidity.      Right lower leg: Edema present.      Left lower leg: Edema present.      Comments: No clubbing or cyanosis   Skin:     General: Skin is warm and dry.      Capillary Refill: Capillary refill takes less than 2 seconds.   Neurological:      Comments: She is anxious and in respiratory distress.  No focal weakness.         Medications  Current Facility-Administered Medications   Medication Dose Route Frequency Provider Last Rate Last Admin   • Respiratory Therapy Consult   Nebulization Continuous RT Adal Molina M.D.       • lidocaine (XYLOCAINE) 1 % injection 1-2 mL  1-2 mL Tracheal Tube Q30 MIN PRN Adal Molina M.D.       • Pharmacy Consult: Enteral tube insertion - review meds/change route/product selection  1 Each Other PHARMACY TO DOSE Adal Molina M.D.       • dexmedetomidine (PRECEDEX) 400 mcg/100mL NS premix infusion  0-1.5 mcg/kg/hr Intravenous Continuous Adal Molina M.D. 26.3 mL/hr at 02/10/21 0644 1.5 mcg/kg/hr at 02/10/21 0644   • ipratropium-albuterol (DUONEB) nebulizer  solution  3 mL Nebulization Q2HRS PRN (RT) Adal Molina M.D.       • [START ON 2/11/2021] omeprazole (FIRST-OMEPRAZOLE) 2 mg/mL oral susp 40 mg  40 mg Enteral Tube DAILY Adal Molina M.D.       • HYDROmorphone pf (DILAUDID) injection 0.5-2 mg  0.5-2 mg Intravenous Q HOUR PRN Adal Molina M.D.   2 mg at 02/10/21 0724   • furosemide (LASIX) 100 mg in  mL infusion  5 mg/hr Intravenous Continuous Adal Molina M.D.       • K+ Scale: Goal of 4.5  1 Each Intravenous Q6HRS Adal Molina M.D.       • epoprostenol (FLOLAN) 1.5 mg in glycine diluent for flolan 50 mL for Inhalation  0.01-0.05 mcg/kg/min Inhalation Continuous Adal Molina M.D.       • glucose 4 g chewable tablet 16 g  16 g Oral Q15 MIN PRN Viola Brannon, A.P.N.        And   • dextrose 50% (D50W) injection 50 mL  50 mL Intravenous Q15 MIN PRN Viola Brannon, A.P.N.       • Pharmacy Consult Request ...Pain Management Review 1 Each  1 Each Other PHARMACY TO DOSE Herminia C Foreign, P.A.-C.       • acetaminophen (TYLENOL) tablet 1,000 mg  1,000 mg Oral Q6HRS Herminia C Foreign, P.A.-C.   1,000 mg at 02/09/21 2200   • mupirocin (BACTROBAN) 2 % ointment 1 Application  1 Application Topical BID Herminia C Foreign, P.A.-C.   1 Application at 02/10/21 0600   • aspirin EC (ECOTRIN) tablet 81 mg  81 mg Oral DAILY Herminia C Foreign, P.A.-C.   Stopped at 02/09/21 0600   • MD Alert...Warfarin per Pharmacy   Other PHARMACY TO DOSE Herminia C Foreign, P.A.-C.       • oxyCODONE immediate-release (ROXICODONE) tablet 5 mg  5 mg Oral Q3HRS PRN Herminia C Foreign, P.A.-C.       • oxyCODONE immediate release (ROXICODONE) tablet 10 mg  10 mg Oral Q3HRS PRN Herminia C Foreign, P.A.-C.   10 mg at 02/09/21 1707   • traMADol (ULTRAM) 50 MG tablet 50 mg  50 mg Oral Q4HRS PRN Herminia C Foreign, P.A.-C.   50 mg at 02/10/21 0452   • ondansetron (ZOFRAN) syringe/vial injection 8 mg  8 mg Intravenous Q6HRS PRN Herminia C Foreign, P.A.-C.   8 mg at 02/09/21 1246    Or   •  prochlorperazine (COMPAZINE) injection 10 mg  10 mg Intravenous Q6HRS PRN Herminia C Foreign, P.A.-C.        Or   • promethazine (PHENERGAN) suppository 25 mg  25 mg Rectal Q6HRS PRN Herminia C Foreign, P.A.-C.       • norepinephrine (Levophed) infusion 8 mg/250 mL (premix)  0-30 mcg/min Intravenous Continuous Herminia C Foreign, P.A.-C. 35.6 mL/hr at 02/10/21 0836 19 mcg/min at 02/10/21 0836       Fluids    Intake/Output Summary (Last 24 hours) at 2/10/2021 0840  Last data filed at 2/10/2021 0800  Gross per 24 hour   Intake 2480.07 ml   Output 2045 ml   Net 435.07 ml       Laboratory  Recent Labs     02/08/21  2331 02/09/21  0218 02/10/21  0750   ISTATAPH 7.427 7.468 7.286*   ISTATAPCO2 34.5 32.0 39.8*   ISTATAPO2 92* 103* 112*   ISTATATCO2 24 24 20   [id number] 97 98 98   ISTATARTHCO3 22.8 23.2 19.0   ISTATARTBE -1 0 -7*   ISTATTEMP 36.4 C 36.0 C 98.2 F   ISTATFIO2 30 30 100   ISTATSPEC Arterial Arterial Arterial   ISTATAPHTC 7.436 7.483 7.289*   [id number] 88* 97* 110*         Recent Labs     02/07/21  1100 02/07/21  2230 02/08/21  1255 02/08/21  2302 02/09/21  0330 02/10/21  0047   SODIUM 121* 128*  --  132* 135 128*   POTASSIUM 4.5 4.3 4.2 5.3  5.3 5.0 4.2   CHLORIDE 92* 96  --  98 101 94*   CO2 19* 23  --  21 23 21   BUN 30* 28*  --  28* 28* 32*   CREATININE 2.04* 2.02*  --  2.12* 2.28* 2.07*   MAGNESIUM 2.2 2.3 3.0*  --   --   --    CALCIUM 8.1* 8.3*  --  8.0* 8.2* 7.9*     Recent Labs     02/08/21  2302 02/09/21  0330 02/10/21  0047   GLUCOSE 126* 136* 86     Recent Labs     02/07/21 2230 02/08/21  0411 02/09/21  0330 02/10/21  0047 02/10/21  0435   WBC 4.8 4.8 16.6* 7.4 5.3   NEUTSPOLYS 58.90 55.80  --   --   --    LYMPHOCYTES 20.70* 20.20*  --   --   --    MONOCYTES 11.30 12.00  --   --   --    EOSINOPHILS 7.70* 10.30*  --   --   --    BASOPHILS 0.80 1.30  --   --   --      Recent Labs     02/08/21  0000 02/08/21  1255 02/09/21  0330 02/09/21  1121 02/10/21  0047 02/10/21  0435   RBC  --   --  2.63*  --  3.16* 3.24*    HEMOGLOBIN  --   --  7.1* 8.4* 8.7* 8.8*   HEMATOCRIT  --   --  22.4* 25.8* 26.8* 27.3*   PLATELETCT  --  120* 74*  --  33* 81*   PROTHROMBTM 20.1* 19.8* 17.9*  --  21.1*  --    APTT >240.0* 69.6*  --   --   --   --    INR 1.66* 1.63* 1.43*  --  1.77*  --        Imaging  X-Ray:  I have personally reviewed the images and compared with prior images. and My impression is: Increased edema    Assessment/Plan  * Cardiogenic shock (HCC)  Assessment & Plan  Echo with RV overload and severely dilated right ventricle with reduced right ventricular systolic function due to rheumatic mitral stenosis  I am titrating norepinephrine to keep mean arterial pressure greater than 65    Acute hypoxemic respiratory failure (HCC)  Assessment & Plan  Intubated 2/10  All of the appropriate ventilator bundles are in place  Full vent support    Acute on chronic right-sided heart failure (HCC)  Assessment & Plan  Echo with RV overload and severely dilated right ventricle with reduced right ventricular systolic function due to rheumatic mitral stenosis  Begin diuresis with furosemide drip  May need CRRT with UF  I am titrating norepinephrine to keep mean arterial pressure greater than 65    Pulmonary hypertension (HCC)- (present on admission)  Assessment & Plan  Severe pulmonary hypertension with RVSP at least 90 mmHg  Force diuresis with furosemide  Begin trial of Flolan    Rheumatic mitral stenosis- (present on admission)  Assessment & Plan  S/P MVR with 29 mm Medtronic Mosaic porcine valve and repair of the tricuspid valve with 38 mm Chavez flexible annuloplasty band on 2/8 by Dr. Webber  Left sided Maze procedure with MARK ligation on 2/8 by Dr. Webber  Continue Coumadin    Acute pulmonary edema (HCC)  Assessment & Plan  Cardiogenic pulmonary edema due to severe pulmonary hypertension with dilated right ventricle and reduced right ventricular systolic function  Force diuresis with furosemide    Hemoptysis  Assessment & Plan  Due to  cardiogenic pulmonary edema  Bronchoscopy on 2/10 did not reveal an endobronchial bleeding site and the blood easily cleared with lavage and suctioning    Atrial fibrillation (HCC)  Assessment & Plan  Optimize potassium and magnesium  She is currently in sinus rhythm    RUSH (acute kidney injury) (HCC)- (present on admission)  Assessment & Plan  Suspect cardiorenal  Monitor renal function and urine output  Avoid nephrotoxins and renal dose medications       VTE:  Coumadin  Ulcer: PPI  Lines: Central Line  Ongoing indication addressed, Arterial Line  Ongoing indication addressed and Echols Catheter  Ongoing indication addressed    I have performed a physical exam and reviewed and updated ROS and Plan today (2/10/2021). In review of yesterday's note (2/9/2021), there are no changes except as documented above.     I have assessed and reassessed her respiratory status with ventilator adjustments, airway mechanics, ventilator waveforms, blood pressure, hemodynamics, cardiovascular status with titration of norepinephrine and her neurologic status.  She is at increased risk for worsening respiratory, cardiovascular and renal system dysfunction.    Discussed patient condition and risk of morbidity and/or mortality with RN, RT, Pharmacy, Charge nurse / hot rounds, QA team and CVS     The patient remains critically ill.  Critical care time = 125 minutes in directly providing and coordinating critical care and extensive data review.  No time overlap and excludes procedures.    Adal Molina MD  Pulmonary and Critical Care Medicine

## 2021-02-10 NOTE — ASSESSMENT & PLAN NOTE
Cardiogenic pulmonary edema due to severe pulmonary hypertension with dilated right ventricle and reduced right ventricular systolic function  Force diuresis with furosemide

## 2021-02-10 NOTE — PROGRESS NOTES
Updated Susi Luis APRN regarding patient status. Milrinone stopped, CI 2.2 and  post-stop of gtt. Helen appears ventricular waveform and potentially inaccurate for PAPs. Per RHEA Goldman and Jesus per protocol. Also addressed that patient still on levo gtt, however art line and cuff pressure correlate within 10 points. Femoral arterial line to be d/c per APRN. OK that patient remains on levo with art line d/c

## 2021-02-11 LAB
BACTERIA SPEC RESP CULT: ABNORMAL
BACTERIA SPEC RESP CULT: ABNORMAL
GLUCOSE BLD-MCNC: 18 MG/DL (ref 65–99)
GLUCOSE BLD-MCNC: 61 MG/DL (ref 65–99)
GLUCOSE BLD-MCNC: 96 MG/DL (ref 65–99)
GLUCOSE BLD-MCNC: <10 MG/DL (ref 65–99)
GRAM STN SPEC: ABNORMAL
LV EJECT FRACT  99904: 20
SIGNIFICANT IND 70042: ABNORMAL
SITE SITE: ABNORMAL
SOURCE SOURCE: ABNORMAL

## 2021-02-11 PROCEDURE — 93308 TTE F-UP OR LMTD: CPT | Mod: 26 | Performed by: INTERNAL MEDICINE

## 2021-02-11 NOTE — PROGRESS NOTES
Patient time of death 2240.   Family at bedside. Family resource and child bereavement packet provided.   Dr. So and Dr. Galeano notified.    notified, patient not coroners case.

## 2021-02-11 NOTE — PROGRESS NOTES
Interval Critical Care Progress Note:    Called by RN because pt has hgb of 6.6, pH 6.88, and plts 24.  Pt on maximum vasopressor support, with right heart impella.    Dr. Molina informed family during the day that the pt will likely die due to the pt's current medical condition.     Pt's labs portent imminent demise.

## 2021-02-11 NOTE — CONSULTS
Cardiology Initial Consultation    Date of Service  2/10/2021    Referring Physician  Alphonse Gauthier,    Reason for Consultation  Right sided heart failure.    History of Presenting Illness  Carolyn Hauser is a 40 y.o. female with a past medical history of mitral valve replacement and tricuspid repair who is in critical state inclusing right sided heart failure. Right sided Impella placement was requested.    Review of Systems  Review of Systems   Unable to perform ROS: Intubated       Past Medical History   has a past medical history of Heart valve disease, Moderate to severe pulmonary hypertension (HCC), and Severe tricuspid regurgitation.    Surgical History   has a past surgical history that includes mitral valve replace (2/8/2021); tricuspid valve repair (2/8/2021); and quyen (2/8/2021).    Family History  family history includes Heart Disease in her father, maternal uncle, and paternal uncle; Hypertension in her maternal uncle and paternal uncle; No Known Problems in her mother.    Social History   reports that she has never smoked. She quit smokeless tobacco use about 5 weeks ago.  Her smokeless tobacco use included chew. She reports previous alcohol use. She reports previous drug use.    Medications  Prior to Admission Medications   Prescriptions Last Dose Informant Patient Reported? Taking?   furosemide (LASIX) 40 MG Tab 2/2/2021  No No   Sig: Take 1 Tab by mouth 2 times a day.   metoprolol (LOPRESSOR) 25 MG Tab 2/2/2021  No No   Sig: Take 0.5 Tabs by mouth 2 times a day.   potassium chloride SA (K-DUR) 10 MEQ Tab CR 2/2/2021  No No   Sig: Take 1 Tab by mouth 2 times a day.      Facility-Administered Medications: None       Allergies  No Known Allergies    Vital signs in last 24 hours  Temp:  [36.3 °C (97.3 °F)-36.7 °C (98 °F)] 36.7 °C (98 °F)  Pulse:  [] 51  Resp:  [13-34] 13  BP: ()/(36-65) 75/38  SpO2:  [73 %-100 %] 73 %    Physical Exam  Physical Exam  Constitutional:        Interventions: She is sedated and intubated.   HENT:      Head: Normocephalic and atraumatic.   Eyes:      Comments: Closed.   Cardiovascular:      Rate and Rhythm: Normal rate and regular rhythm.   Pulmonary:      Effort: She is intubated.   Musculoskeletal:         General: Swelling present.   Skin:     General: Skin is warm and dry.         Lab Review  Lab Results   Component Value Date/Time    WBC 5.3 02/10/2021 04:35 AM    RBC 3.24 (L) 02/10/2021 04:35 AM    HEMOGLOBIN 8.8 (L) 02/10/2021 04:35 AM    HEMATOCRIT 27.3 (L) 02/10/2021 04:35 AM    MCV 84.3 02/10/2021 04:35 AM    MCH 27.2 02/10/2021 04:35 AM    MCHC 32.2 (L) 02/10/2021 04:35 AM    MPV 9.1 02/10/2021 04:35 AM      Lab Results   Component Value Date/Time    SODIUM 124 (L) 02/10/2021 04:15 PM    POTASSIUM 5.0 02/10/2021 04:15 PM    CHLORIDE 93 (L) 02/10/2021 04:15 PM    CO2 18 (L) 02/10/2021 04:15 PM    GLUCOSE 43 (LL) 02/10/2021 04:15 PM    BUN 31 (H) 02/10/2021 04:15 PM    CREATININE 2.33 (H) 02/10/2021 04:15 PM      Lab Results   Component Value Date/Time    ASTSGOT 80 (H) 02/10/2021 08:47 AM    ALTSGPT 39 02/10/2021 08:47 AM     Lab Results   Component Value Date/Time    TROPONINT 102 (H) 02/03/2021 01:10 AM       No results for input(s): NTPROBNP in the last 72 hours.    Cardiac Imaging and Procedures Review  CARDIAC STUDIES/PROCEDURES:    ECHOCARDIOGRAM CONCLUSIONS (02/10/21)  Limited echo to evaluate for pericardial effusion.  Prior echo on 2/3/21.By comparison, a prosthetic mitral valve is is place.  Normal pericardium without effusion.  Flattened septum in systole and diastole consistent with RV pressure   and volume overload.  Left ventricular ejection fraction is visually estimated to be 50%.  Moderately dilated right ventricle.  Reduced right ventricular systolic function.   Enlarged right atrium.  Moderately dilated left atrium.  Known mitral valve bioprosthesis with appropriate transvalvular gradient.  Mean transvalvular gradient is 4 mmHg  at a heart rate of 95 BPM.  Estimated right ventricular systolic pressure is 90 mmHg.  (study result reviewed)    Assessment/Plan  Right sided heart failure status post mitral valve replacement (29 mm Medtronic Mosaic porcine valve), tricuspid valve repair (38 mm Chavez flexible annuloplasty band), left-sided maze procedure (Medtronic cryoprobe) Alphonse Bradshaw Dr. 02/08/21: She remains intubated critically ill. Placement of right sided Impella was requested. The consent was obtained by her .      Thank you for allowing me to participate in the care of this patient.    I will continue to follow this patient    Please contact me with any questions.    Cliff Nieto M.D.   Cardiologist, [facility]  [phone]

## 2021-02-11 NOTE — PROGRESS NOTES
Patient condition and plan of care discussed w/ Dr. Molina. Per MD continue current efforts and do not escalate care.   Dr. Nieto also updated on patient condition, per Dr. Nieto leave impella at P8.   ROACIO Mello with cardiac surgery aware of plan of care.     Patient  at bedside.

## 2021-02-11 NOTE — DISCHARGE SUMMARY
Death Summary    Cause of Death  Cardiogenic shock due to right heart failure due to rheumatic mitral stenosis.    Comorbid Conditions at the Time of Death  Principal Problem:    Cardiogenic shock (HCC) POA: Unknown  Active Problems:    Rheumatic mitral stenosis POA: Yes    Pulmonary hypertension (HCC) POA: Yes    Acute on chronic right-sided heart failure (HCC) POA: Unknown    Acute hypoxemic respiratory failure (HCC) POA: Unknown    RUSH (acute kidney injury) (HCC) POA: Yes    Acute encephalopathy POA: Unknown    Rheumatic heart failure (congestive) (HCC) POA: Unknown    Atrial fibrillation (HCC) POA: Unknown    Hemoptysis POA: Unknown    Acute pulmonary edema (HCC) POA: Unknown    Hyponatremia POA: Yes    Bilirubinemia POA: Yes    Coagulopathy (HCC) POA: Unknown  Resolved Problems:    Encounter for weaning from ventilator (HCC) POA: Unknown    Hypoglycemia POA: Yes      History of Presenting Illness and Hospital Course  This is a 40 y.o. female admitted 2/3/2021 with acute encephalopathy due to hypoglycemia and right heart failure due to severe rheumatic mitral stenosis.  She underwent MVR with tricuspid valve repair.  Her procedure was uncomplicated and she was liberated from the ventilator.  On the day of death she developed worsening right heart failure and required intubation.  She had progressive cardiogenic shock due to her right heart failure and did not respond to all aggressive measures to improve her cardiac function including right sided Impella  placement.  She subsequently .    Death Date: 02/10/21   Death Time:          Pronounced By (RN1): Pancho Malin RN  Pronounced By (RN2): JUVE Bertrand MD  Pulmonary and Critical Care Medicine

## 2021-02-11 NOTE — PROGRESS NOTES
Patient heart rate and BP decreasing.   Patient family at bedside, education given regarding patient condition and all questions answered.   Dr. Small notified of hemodynamic instability and imminent death.

## 2021-02-11 NOTE — PROGRESS NOTES
Patient returned to bedside from cath at 1815 with cath lab RNs and floor RNs present. Intensivist and RT also at bedside. Patient arrived with Right side impella in place, vasopressors over max doses per cardiologist, ABG acquired pH 6.9 MD aware, patient given 4 amps of bicarb. Vitals signs BP 70's/40's when arrived heart in SR 60's. Pulse oximeter not able to read. Heart rate and blood pressure continuing to decline despite medications. With blood pressure down to 50's systolic and heart rate into the 50's. Patient made DNR by MD with  at bedside.     Vitals not slaving into Epic post arrival back to room from cath lab until 1900. Vitals not receivable from any monitors. MD aware of vital signs until problem was resolved.

## 2021-02-11 NOTE — CARE PLAN
Adult Ventilation Update    Total Vent Days: 1  7.5 ETT at 21    APVCMV  30 280 +8 100%    Pt intubated, bronched, started on flolan.   NO SBTs at this time.

## 2021-02-11 NOTE — PROGRESS NOTES
Pulmonary and Critical Care Medicine Progress Note    This lady has just returned from the cardiac catheterization laboratory where she had placement of a right-sided Impella.  She is in cardiogenic shock.  She is on high dose epinephrine and norepinephrine.  Her arterial blood gas reveals a pH of 6.93, PCO2 of 65 and PO2 of 55.  She is on maximum mechanical ventilatory support with very poor lung compliance.    In my opinion, CPR is nonbeneficial.  I do not expect this lady to survive.  Her  is at the bedside.  And I updated him to the best of my ability.  We have made multiple attempts to get a [place]  through the  services, but we have not been successful as of yet.  This gentleman speaks limited English, but in my opinion, he clearly understood when I told him that I expected this lady to die.    Adal Molina MD  Pulmonary and Critical Care Medicine

## 2021-02-11 NOTE — PROGRESS NOTES
Radhaella orders clarified w/ Dr. So.   Per Dr. So order impella order set without systemic heparin.   Do not obtain calculated juice scores per MD due to no escalation of care.   Pharmacist notified and placing orders

## 2021-02-12 LAB
BACTERIA BRONCH AEROBE CULT: ABNORMAL
BACTERIA BRONCH AEROBE CULT: ABNORMAL
GRAM STN SPEC: ABNORMAL
SIGNIFICANT IND 70042: ABNORMAL
SITE SITE: ABNORMAL
SOURCE SOURCE: ABNORMAL

## 2021-02-12 ASSESSMENT — ENCOUNTER SYMPTOMS: FEVER: 1

## 2021-02-12 NOTE — PROCEDURES
DATE OF PROCEDURE:  02/10/2021     REFERRING PHYSICIAN:  Alphonse Webber MD     PROCEDURES:  1.  Right-sided Impella placement.  2.  Right heart catheterization.  3.  Monitor conscious sedation.     PREPROCEDURE DIAGNOSES:  1.  Right-sided heart failure.  2.  Cardiogenic shock.     POSTPROCEDURE DIAGNOSES:  1.  Successful implantation of right-sided Impella.  2.  Elevated right heart pressures.     INDICATION:  The patient is a 40-year-old female status post mitral valve   replacement and tricuspid valve repair, who is critically ill.  Impella placement   was requested.     DESCRIPTION OF PROCEDURE:  The patient was brought to the cardiac   catheterization laboratory.  The consent was obtained by her .  The   right inguinal area was anesthetized with 2% Xylocaine.  An 8-[place]e sheath   was inserted into the right femoral vein using modified Seldinger technique   under ultrasound guidance.  The left inguinal area was anesthetized with 2%   Xylocaine.  A 6-[place]e sheath was inserted into the left femoral vein using   modified Seldinger technique under ultrasound guidance.  The right-sided   sheath was upgraded to 24-[place]e.  IV heparin was given.  Through the   left-sided sheath, a Francitas-Siddhartha catheter was positioned at the pulmonary   artery.  Right heart pressure measurements were obtained.  Through the right   venous sheath, a right-sided Impella was positioned into the left pulmonary   artery with a support of Amplatz wire as a alicia support.  The patient   tolerated the procedure well.  At the end of the procedure, all sheaths were   secured into place.  She was transferred back to CICU in stable condition.     IMPRESSION:  1.  Successful implantation of right-sided Impella.  2.  Elevated right heart pressures.     RECOMMENDATION:  Recommend to continue with supportive care.    SEDATION TIME: The patient's sedation was managed by myself with continuous   face to face time with the patient for 15 minutes from  16:35 to 16:50.       ______________________________  MD EDVIN FINN/BILL/NAKIA    DD:  02/12/2021 09:52  DT:  02/12/2021 10:10    Job#:  [id number]

## 2021-02-13 LAB
BACTERIA BLD CULT: ABNORMAL
SIGNIFICANT IND 70042: ABNORMAL
SIGNIFICANT IND 70042: ABNORMAL
SITE SITE: ABNORMAL
SITE SITE: ABNORMAL
SOURCE SOURCE: ABNORMAL
SOURCE SOURCE: ABNORMAL

## 2021-02-27 LAB — EKG IMPRESSION: NORMAL

## 2021-03-10 LAB
FUNGUS SPEC CULT: NORMAL
SIGNIFICANT IND 70042: NORMAL
SITE SITE: NORMAL
SOURCE SOURCE: NORMAL

## (undated) DEVICE — ELECTRODE 850 FOAM ADHESIVE - HYDROGEL RADIOTRNSPRNT (50/PK)

## (undated) DEVICE — ELECTRODE DUAL RETURN W/ CORD - (50/PK)

## (undated) DEVICE — PROBE CRYOMAZE - (DAVINCI)

## (undated) DEVICE — SET LEADWIRE 5 LEAD BEDSIDE DISPOSABLE ECG (1SET OF 5/EA)

## (undated) DEVICE — DRESSING TRANSPARENT FILM TEGADERM 2.375 X 2.75"  (100EA/BX)"

## (undated) DEVICE — POLY UMBILICAL TAPE 1/8X30 - (36/BX)

## (undated) DEVICE — BAG RESUSCITATION DISPOSABLE - WITH MASK (10 EA/CA)

## (undated) DEVICE — KIT CATHETERIZATION TWO-LUMEN CENTRAL VENOUS PI CVC

## (undated) DEVICE — SET EXTENSION WITH 2 PORTS (48EA/CA) ***PART #2C8610 IS A SUBSTITUTE*****

## (undated) DEVICE — TUBING CLEARLINK DUO-VENT - C-FLO (48EA/CA)

## (undated) DEVICE — GOWN SURGEONS X-LARGE - DISP. (30/CA)

## (undated) DEVICE — QUICKLOADS COR-KNOT TITANIUM - (6EA/PK 12PK/BX)

## (undated) DEVICE — BANDAGE ELASTIC 4 IN X 5 YDS - LATEX FREE(10/BX 5BX/CA)

## (undated) DEVICE — SOD. CHL. INJ. 0.9% 1000 ML - (14EA/CA 60CA/PF)

## (undated) DEVICE — NEPTUNE 4 PORT MANIFOLD - (20/PK)

## (undated) DEVICE — TUBE CHEST 32FR. STRAIGHT - (10EA/CA)

## (undated) DEVICE — SUCTION INSTRUMENT YANKAUER BULBOUS TIP W/O VENT (50EA/CA)

## (undated) DEVICE — SUTURE 5-0 PROLENE C-1 D/A 24 (36PK/BX)"

## (undated) DEVICE — PROTECTOR ULNA NERVE - (36PR/CA)

## (undated) DEVICE — SLEEVE, VASO, THIGH, MED

## (undated) DEVICE — ARMBOARD  SMALL IV 9 INLONG - (25EA/CA)

## (undated) DEVICE — LACTATED RINGERS INJ 1000 ML - (14EA/CA 60CA/PF)

## (undated) DEVICE — ELECTRODE RADIOLUCNT SOLID GEL DEFIB PADS (12EA/CA)

## (undated) DEVICE — TRANSDUCER BIFURCATED MONITORING KIT (10EA/CA)

## (undated) DEVICE — SET BIFURCATED BLOOD - (48EA/CS)

## (undated) DEVICE — CANISTER SUCTION 3000ML MECHANICAL FILTER AUTO SHUTOFF MEDI-VAC NONSTERILE LF DISP  (40EA/CA)

## (undated) DEVICE — GOWN WARMING STANDARD FLEX - (30/CA)

## (undated) DEVICE — SET CATHETER CENTRAL VENOUS 5X15 ORDER BY THE EACH

## (undated) DEVICE — CHLORAPREP 26 ML APPLICATOR - ORANGE TINT(25/CA)

## (undated) DEVICE — STOPCOCK MALE 4-WAY - (50/CA)

## (undated) DEVICE — SODIUM CHL. INJ. 0.9% 500ML (24EA/CA 50CA/PF)

## (undated) DEVICE — BLADE STERNUM SAW SURGICAL 32.0 X 6.4 MM STERILE (1/EA)

## (undated) DEVICE — SUTURE 4-0 30CM STRATAFIX SPIRAL PS-2 (12EA/BX)

## (undated) DEVICE — KIT ANESTHESIA W/CIRCUIT & 3/LT BAG W/FILTER (20EA/CA)

## (undated) DEVICE — D-5-W INJ. 500 ML - (24EA/CA)

## (undated) DEVICE — SYS DLV COST CLS RM TEMP - INJECTATE (CO-SET II) (10EA/CA)

## (undated) DEVICE — SUTURE 4-0 PROLENE RB-1 D/A 36 (36PK/BX)"

## (undated) DEVICE — GLOVE BIOGEL SZ 7.5 SURGICAL PF LTX - (50PR/BX 4BX/CA)

## (undated) DEVICE — DERMABOND ADVANCED - (12EA/BX)

## (undated) DEVICE — SUTURE 6-0 PROLENE RB-2 D/A 30 (36PK/BX)"

## (undated) DEVICE — SET FLUID WARMING STANDARD FLOW - (10/CA)

## (undated) DEVICE — KIT ULTRASND COVER - (20EA/CA)

## (undated) DEVICE — TUBE CHEST 32FR. RIGHT ANGLED (10EA/CA)

## (undated) DEVICE — SENSOR SPO2 NEO LNCS ADHESIVE (20/BX) SEE USER NOTES

## (undated) DEVICE — SUTURE 4-0 PROLENE V-7 D/A (36PK/BX)

## (undated) DEVICE — SUTURE 5-0 PROLENE RB-1 D/A 36 (36PK/BX)"

## (undated) DEVICE — SUTURE OHS

## (undated) DEVICE — MASK ANESTHESIA ADULT  - (100/CA)

## (undated) DEVICE — MICRODRIP PRIMARY VENTED 60 (48EA/CA) THIS WAS PART #2C8428 WHICH WAS DISCONTINUED

## (undated) DEVICE — SUTURE 4-0 PROLENE SH 36 (36PK/BX)"

## (undated) DEVICE — GLOVE BIOGEL INDICATOR SZ 8 SURGICAL PF LTX - (50/BX 4BX/CA)

## (undated) DEVICE — TUBE CONNECT SUCTION CLEAR 120 X 1/4" (50EA/CA)"

## (undated) DEVICE — WIRE STEEL 5-0 B&S 20 OHS - 5/PK 12PK/BX ITEM. D5329 OR D6625 CAN BE USED AS A SUB

## (undated) DEVICE — BLADE SURGICAL #15 - (50/BX 3BX/CA)

## (undated) DEVICE — DRESSING SURGICAL NUKNIT 6X9 (10EA/BX)

## (undated) DEVICE — PACK CV DRAPING/BASIN 2PART - (1/CA)

## (undated) DEVICE — KIT INTROPERCUTANEOUS SHEATH - 8.5 FR W/MAX BARRIER AND BIOPATCH  (5/CA)

## (undated) DEVICE — LEAD PACING TEMP MYO - (12/BX)

## (undated) DEVICE — DEVICE KIT COR-KNOT COMBO2 DEVICES & 12 KNOTS PER KIT (6KT/CA)

## (undated) DEVICE — INSERT STEALTH #5 - (10/BX)

## (undated) DEVICE — FIBRILLAR SURGICEL 4X4 - 10/CA

## (undated) DEVICE — DRESSING TRANSPARENT FILM TEGADERM 4 X 4.75" (50EA/BX)"

## (undated) DEVICE — ORGANIZER SUTURE GABBAY-FRAT - ER STERILE (3/SET 4ST/BX)

## (undated) DEVICE — TUBE E-T HI-LO CUFF 7.5MM (10EA/PK)

## (undated) DEVICE — Device

## (undated) DEVICE — GOWN SURGEONS LARGE - (32/CA)

## (undated) DEVICE — SODIUM CHL IRRIGATION 0.9% 1000ML (12EA/CA)

## (undated) DEVICE — SUTURE NONABSORBABLE ETHIBOND EXCEL 2-0 V-5 2 ARM BRAID GREEN WHITE (6EA/BX)

## (undated) DEVICE — SENSOR CEREBRAL AND SOMATIC MONITORING (20/CA)

## (undated) DEVICE — HEAD HOLDER JUNIOR/ADULT

## (undated) DEVICE — TUBING PRSS MNTR 72IN M/ M LL - (25/BX) MONIT. LINE W/MALE L/L

## (undated) DEVICE — WIRE TEMPORARY CARDIAC PACING ATRIUM SS 24 (12PK/BX)"

## (undated) DEVICE — KIT RADIAL ARTERY 20GA W/MAX BARRIER AND BIOPATCH  (5EA/CA) #10740 IS FOR THE SET RADIAL ARTERIAL

## (undated) DEVICE — CATHETER THERMALDILUTION SWAN - (5EA/CA)

## (undated) DEVICE — SUTURE 2-0 ETHIBOND V-5 - (6/BX)